# Patient Record
Sex: FEMALE | Race: WHITE | NOT HISPANIC OR LATINO | Employment: OTHER | URBAN - NONMETROPOLITAN AREA
[De-identification: names, ages, dates, MRNs, and addresses within clinical notes are randomized per-mention and may not be internally consistent; named-entity substitution may affect disease eponyms.]

---

## 2017-01-09 ENCOUNTER — HOSPITAL ENCOUNTER (OUTPATIENT)
Dept: CT IMAGING | Facility: HOSPITAL | Age: 82
Discharge: HOME | End: 2017-01-09
Attending: INTERNAL MEDICINE | Admitting: INTERNAL MEDICINE

## 2017-01-09 DIAGNOSIS — Q24.8 PERICARDIAL CYST: ICD-10-CM

## 2017-01-09 LAB — CREAT BLDA-MCNC: 0.9 MG/DL (ref 0.6–1.3)

## 2017-01-09 PROCEDURE — 82565 ASSAY OF CREATININE: CPT

## 2017-01-09 PROCEDURE — 71275 CT ANGIOGRAPHY CHEST: CPT

## 2017-01-09 PROCEDURE — 0 IOPAMIDOL PER 1 ML: Performed by: INTERNAL MEDICINE

## 2017-01-09 RX ADMIN — IOPAMIDOL 150 ML: 755 INJECTION, SOLUTION INTRAVENOUS at 10:30

## 2017-08-15 ENCOUNTER — APPOINTMENT (OUTPATIENT)
Dept: GENERAL RADIOLOGY | Facility: HOSPITAL | Age: 82
End: 2017-08-15

## 2017-08-15 ENCOUNTER — HOSPITAL ENCOUNTER (OUTPATIENT)
Facility: HOSPITAL | Age: 82
Setting detail: OBSERVATION
Discharge: HOME | End: 2017-08-15
Attending: EMERGENCY MEDICINE | Admitting: FAMILY MEDICINE

## 2017-08-15 ENCOUNTER — APPOINTMENT (OUTPATIENT)
Dept: CARDIOLOGY | Facility: HOSPITAL | Age: 82
End: 2017-08-15
Attending: FAMILY MEDICINE

## 2017-08-15 VITALS
HEART RATE: 82 BPM | BODY MASS INDEX: 23.9 KG/M2 | SYSTOLIC BLOOD PRESSURE: 132 MMHG | WEIGHT: 140 LBS | RESPIRATION RATE: 18 BRPM | TEMPERATURE: 97.3 F | OXYGEN SATURATION: 98 % | HEIGHT: 64 IN | DIASTOLIC BLOOD PRESSURE: 67 MMHG

## 2017-08-15 DIAGNOSIS — I48.91 ATRIAL FIBRILLATION WITH RVR (HCC): ICD-10-CM

## 2017-08-15 DIAGNOSIS — R07.9 CHEST PAIN, UNSPECIFIED TYPE: Primary | ICD-10-CM

## 2017-08-15 LAB
GLUCOSE BLDC GLUCOMTR-MCNC: 124 MG/DL (ref 70–130)
TROPONIN I SERPL-MCNC: 0.02 NG/ML (ref 0–0.03)
TROPONIN I SERPL-MCNC: 0.03 NG/ML (ref 0–0.03)
TROPONIN I SERPL-MCNC: <0.012 NG/ML (ref 0–0.03)

## 2017-08-15 PROCEDURE — 93010 ELECTROCARDIOGRAM REPORT: CPT | Performed by: INTERNAL MEDICINE

## 2017-08-15 PROCEDURE — 99203 OFFICE O/P NEW LOW 30 MIN: CPT | Performed by: INTERNAL MEDICINE

## 2017-08-15 PROCEDURE — 96366 THER/PROPH/DIAG IV INF ADDON: CPT

## 2017-08-15 PROCEDURE — G0378 HOSPITAL OBSERVATION PER HR: HCPCS

## 2017-08-15 PROCEDURE — 82962 GLUCOSE BLOOD TEST: CPT

## 2017-08-15 PROCEDURE — 36415 COLL VENOUS BLD VENIPUNCTURE: CPT

## 2017-08-15 PROCEDURE — 71010 HC CHEST PA OR AP: CPT

## 2017-08-15 PROCEDURE — C8929 TTE W OR WO FOL WCON,DOPPLER: HCPCS

## 2017-08-15 PROCEDURE — 93005 ELECTROCARDIOGRAM TRACING: CPT

## 2017-08-15 PROCEDURE — 96365 THER/PROPH/DIAG IV INF INIT: CPT

## 2017-08-15 PROCEDURE — 84484 ASSAY OF TROPONIN QUANT: CPT | Performed by: EMERGENCY MEDICINE

## 2017-08-15 PROCEDURE — 99285 EMERGENCY DEPT VISIT HI MDM: CPT

## 2017-08-15 PROCEDURE — 25010000002 PERFLUTREN 6.52 MG/ML SUSPENSION: Performed by: INTERNAL MEDICINE

## 2017-08-15 PROCEDURE — 93005 ELECTROCARDIOGRAM TRACING: CPT | Performed by: FAMILY MEDICINE

## 2017-08-15 PROCEDURE — 93306 TTE W/DOPPLER COMPLETE: CPT | Performed by: INTERNAL MEDICINE

## 2017-08-15 PROCEDURE — 84484 ASSAY OF TROPONIN QUANT: CPT | Performed by: FAMILY MEDICINE

## 2017-08-15 RX ORDER — DILTIAZEM HCL/D5W 125 MG/125
5-15 PLASTIC BAG, INJECTION (ML) INTRAVENOUS
Status: DISCONTINUED | OUTPATIENT
Start: 2017-08-15 | End: 2017-08-15 | Stop reason: HOSPADM

## 2017-08-15 RX ORDER — SODIUM CHLORIDE 0.9 % (FLUSH) 0.9 %
1-10 SYRINGE (ML) INJECTION AS NEEDED
Status: DISCONTINUED | OUTPATIENT
Start: 2017-08-15 | End: 2017-08-15 | Stop reason: HOSPADM

## 2017-08-15 RX ORDER — LISINOPRIL 20 MG/1
40 TABLET ORAL DAILY
Status: DISCONTINUED | OUTPATIENT
Start: 2017-08-15 | End: 2017-08-15 | Stop reason: HOSPADM

## 2017-08-15 RX ORDER — ACETAMINOPHEN 500 MG
1000 TABLET ORAL ONCE
Status: COMPLETED | OUTPATIENT
Start: 2017-08-15 | End: 2017-08-15

## 2017-08-15 RX ORDER — METHOCARBAMOL 500 MG/1
500 TABLET, FILM COATED ORAL 4 TIMES DAILY
Status: DISCONTINUED | OUTPATIENT
Start: 2017-08-15 | End: 2017-08-15 | Stop reason: HOSPADM

## 2017-08-15 RX ORDER — POLYETHYLENE GLYCOL 3350 17 G/17G
17 POWDER, FOR SOLUTION ORAL DAILY
Status: DISCONTINUED | OUTPATIENT
Start: 2017-08-15 | End: 2017-08-15 | Stop reason: HOSPADM

## 2017-08-15 RX ORDER — DOCUSATE SODIUM 100 MG/1
100 CAPSULE, LIQUID FILLED ORAL 2 TIMES DAILY
Status: DISCONTINUED | OUTPATIENT
Start: 2017-08-15 | End: 2017-08-15 | Stop reason: HOSPADM

## 2017-08-15 RX ORDER — HYDROCHLOROTHIAZIDE 25 MG/1
25 TABLET ORAL DAILY
Status: DISCONTINUED | OUTPATIENT
Start: 2017-08-15 | End: 2017-08-15 | Stop reason: HOSPADM

## 2017-08-15 RX ORDER — SODIUM CHLORIDE 9 MG/ML
100 INJECTION, SOLUTION INTRAVENOUS CONTINUOUS
Status: DISCONTINUED | OUTPATIENT
Start: 2017-08-15 | End: 2017-08-15 | Stop reason: HOSPADM

## 2017-08-15 RX ORDER — NEBIVOLOL 5 MG/1
10 TABLET ORAL DAILY
Status: DISCONTINUED | OUTPATIENT
Start: 2017-08-15 | End: 2017-08-15 | Stop reason: HOSPADM

## 2017-08-15 RX ORDER — PANTOPRAZOLE SODIUM 40 MG/1
40 TABLET, DELAYED RELEASE ORAL
Status: DISCONTINUED | OUTPATIENT
Start: 2017-08-15 | End: 2017-08-15 | Stop reason: HOSPADM

## 2017-08-15 RX ADMIN — SODIUM CHLORIDE 100 ML/HR: 9 INJECTION, SOLUTION INTRAVENOUS at 00:59

## 2017-08-15 RX ADMIN — RIVAROXABAN 10 MG: 10 TABLET, FILM COATED ORAL at 08:36

## 2017-08-15 RX ADMIN — PANTOPRAZOLE SODIUM 40 MG: 40 TABLET, DELAYED RELEASE ORAL at 08:36

## 2017-08-15 RX ADMIN — POLYETHYLENE GLYCOL 3350 17 G: 17 POWDER, FOR SOLUTION ORAL at 08:34

## 2017-08-15 RX ADMIN — HYDROCHLOROTHIAZIDE 25 MG: 25 TABLET ORAL at 08:34

## 2017-08-15 RX ADMIN — PANTOPRAZOLE SODIUM 40 MG: 40 TABLET, DELAYED RELEASE ORAL at 17:36

## 2017-08-15 RX ADMIN — ACETAMINOPHEN 1000 MG: 500 TABLET, FILM COATED ORAL at 01:06

## 2017-08-15 RX ADMIN — METHOCARBAMOL 500 MG: 500 TABLET ORAL at 12:26

## 2017-08-15 RX ADMIN — DOCUSATE SODIUM 100 MG: 100 CAPSULE ORAL at 08:35

## 2017-08-15 RX ADMIN — PERFLUTREN 8.48 MG: 6.52 INJECTION, SUSPENSION INTRAVENOUS at 15:41

## 2017-08-15 RX ADMIN — LISINOPRIL 40 MG: 20 TABLET ORAL at 08:35

## 2017-08-15 RX ADMIN — METHOCARBAMOL 500 MG: 500 TABLET ORAL at 17:36

## 2017-08-15 RX ADMIN — METHOCARBAMOL 500 MG: 500 TABLET ORAL at 08:35

## 2017-08-15 RX ADMIN — DOCUSATE SODIUM 100 MG: 100 CAPSULE ORAL at 17:36

## 2017-08-15 RX ADMIN — DILTIAZEM HCL 125 MG/125ML IN DEXTROSE 5% IV SOLN (1 MG/ML) 15 MG/HR: 125-5/125 SOLUTION at 00:21

## 2017-08-15 NOTE — PLAN OF CARE
Problem: Patient Care Overview (Adult)  Goal: Plan of Care Review  Outcome: Ongoing (interventions implemented as appropriate)    08/15/17 5327   Coping/Psychosocial Response Interventions   Plan Of Care Reviewed With patient;family   Outcome Evaluation   Outcome Summary/Follow up Plan Pt admitted to room 471 from ED with chest pain. Pt is a/o x4. VSS. Pt on Cardizem drip. Family remains at bedside. Call light within reach, will continue to monitor.        Goal: Adult Individualization and Mutuality  Outcome: Ongoing (interventions implemented as appropriate)  Goal: Discharge Needs Assessment  Outcome: Ongoing (interventions implemented as appropriate)    Problem: Acute Coronary Syndrome (ACS) (Adult)  Goal: Signs and Symptoms of Listed Potential Problems Will be Absent or Manageable (Acute Coronary Syndrome)  Outcome: Ongoing (interventions implemented as appropriate)

## 2017-08-15 NOTE — PROGRESS NOTES
Discharge Planning Assessment  [facility]     Patient Name: Sofía Clifton  MRN: [id number]  Today's Date: 8/15/2017    Admit Date: 8/15/2017          Discharge Needs Assessment       08/15/17 1107    Living Environment    Lives With alone    Living Arrangements house    Home Accessibility bed and bath on same level    Stair Railings at Home none    Type of Financial/Environmental Concern none    Transportation Available family or friend will provide    Living Environment    Provides Primary Care For no one    Quality Of Family Relationships supportive    Able to Return to Prior Living Arrangements yes    Discharge Needs Assessment    Concerns To Be Addressed denies needs/concerns at this time    Readmission Within The Last 30 Days no previous admission in last 30 days    Anticipated Changes Related to Illness none    Equipment Currently Used at Home bath bench;cane, quad;raised toilet;walker, rolling;shower chair;wheelchair;commode    Equipment Needed After Discharge none            Discharge Plan       08/15/17 1109    Case Management/Social Work Plan    Plan Home    Additional Comments Spoke with pt and family in room to assess for home needs. Pt lives alone and plans same. Family live close and assist as needed.  Pt has needed DME, does not use W/C but for long distances.  She denies need for HH care at present time. She sees Dr. Rayo Pelayo in [place] and has RX coverage.         Discharge Placement     No information found                Demographic Summary     None            Functional Status     None            Psychosocial     None            Abuse/Neglect     None            Legal     None            Substance Abuse     None            Patient Forms     None          PABLO Jang

## 2017-08-15 NOTE — PLAN OF CARE
Problem: Patient Care Overview (Adult)  Goal: Plan of Care Review  Outcome: Ongoing (interventions implemented as appropriate)    08/15/17 0447 08/15/17 1527   Coping/Psychosocial Response Interventions   Plan Of Care Reviewed With patient;family --    Outcome Evaluation   Outcome Summary/Follow up Plan --  Pt being discharged today, Goals met.          Problem: Acute Coronary Syndrome (ACS) (Adult)  Goal: Signs and Symptoms of Listed Potential Problems Will be Absent or Manageable (Acute Coronary Syndrome)  Outcome: Ongoing (interventions implemented as appropriate)

## 2017-08-15 NOTE — DISCHARGE SUMMARY
"    [facility] Medicine Services  DISCHARGE SUMMARY       Date of Admission: 8/15/2017  Date of Discharge:  8/15/2017  Primary Care Physician: Sukhi Pelayo MD    Presenting Problem/History of Present Illness:  Chest pain [R07.9]  Chest pain, unspecified type [R07.9]     Final Discharge Diagnoses:  Hospital Problem List     Paroxysmal atrial fibrillation    Anemia    Benign essential hypertension    Gastroesophageal reflux disease with esophagitis    Chronic constipation    Osteoarthritis    Hypercholesteremia    Chest pain          Consults: Cardiology    Procedures Performed: None    Pertinent Test Results: NA    Chief Complaint on Day of Discharge: Chest pain    History of Present Illness on Day of Discharge: Patient went back into NSR.  Cards recommended no medication changes and 6 months outpatient follow up at this time.  Patient is currently Chest pain free.  Troponins negative.      Hospital Course:  The patient is a 82 y.o. female who presented to [facility] with chest pain and found to be in atrial fibrillation with RVR.  She was placed on a diltiazem drip and went into NSR.  Cardiac enzymes and were found to be negative.  Cardiology was consulted and recommended continuing the same meds without changes since the patient went back into sinus rhythm.  They also recommended a 6 month follow up.      Condition on Discharge:  Good.    Physical Exam on Discharge:  /67 (BP Location: Right arm, Patient Position: Lying)  Pulse 82  Temp 97.3 °F (36.3 °C) (Temporal Artery )   Resp 18  Ht 64\" (162.6 cm)  Wt 140 lb (63.5 kg)  SpO2 98%  BMI 24.03 kg/m2  Physical Exam   Constitutional: She is oriented to person, place, and time. She appears well-developed and well-nourished.   HENT:   Head: Normocephalic and atraumatic.   Right Ear: External ear normal.   Left Ear: External ear normal.   Nose: Nose normal.   Mouth/Throat: Oropharynx is clear and moist.   Eyes: " Conjunctivae and EOM are normal.   Neck: Normal range of motion. Neck supple.   Cardiovascular: Normal rate, regular rhythm and normal heart sounds.    Pulmonary/Chest: Effort normal and breath sounds normal.   Abdominal: Soft. Bowel sounds are normal. She exhibits no distension. There is no tenderness.   Musculoskeletal: Normal range of motion.   Neurological: She is alert and oriented to person, place, and time.   Skin: Skin is warm and dry.   Psychiatric: She has a normal mood and affect. Her speech is normal and behavior is normal. Cognition and memory are normal.         Discharge Disposition:  Home or Self Care    Discharge Medications:   Sofía Clifton   Home Medication Instructions HEMA:[id number]    Printed on:08/15/17 6506   Medication Information                      aspirin 81 MG EC tablet  Take 81 mg by mouth Daily.             Calcium Carb-Cholecalciferol (CALCIUM 600/VITAMIN D3 PO)  Take 1 tablet by mouth Daily.             cetirizine (zyrTEC) 10 MG tablet  Take 10 mg by mouth Daily.             conjugated estrogens (PREMARIN) 0.625 MG/GM vaginal cream  Insert 0.5 g into the vagina Daily.             dabigatran etexilate (PRADAXA) 150 MG capsu  Take 150 mg by mouth 2 (Two) Times a Day.             dexlansoprazole (DEXILANT) 60 MG capsule  Take 60 mg by mouth Daily.             Diphenhydramine-APAP, sleep, (TYLENOL PM EXTRA STRENGTH PO)  Take  by mouth.             docusate sodium (COLACE) 50 MG capsule  Take  by mouth 2 (Two) Times a Day.             fexofenadine (ALLEGRA) 180 MG tablet  Take 180 mg by mouth Daily.             hydrochlorothiazide (HYDRODIURIL) 25 MG tablet  Take 25 mg by mouth Daily.             HYDROcodone-acetaminophen (NORCO) 7.5-325 MG per tablet  Take 1 tablet by mouth Every 6 (Six) Hours As Needed for moderate pain (4-6).             lisinopril (PRINIVIL,ZESTRIL) 40 MG tablet  Take 40 mg by mouth Daily.             methocarbamol (ROBAXIN) 500 MG tablet  Take 500 mg by mouth 4  (Four) Times a Day.             NABUMETONE PO  Take 750 mg by mouth 2 (Two) Times a Day.             nebivolol (BYSTOLIC) 10 MG tablet  Take 10 mg by mouth Daily.             polyethylene glycol (MIRALAX) packet  Take 17 g by mouth Daily.             rivaroxaban (XARELTO) 10 MG tablet  Take  by mouth Daily.                 Discharge Diet:   Diet Instructions     Diet: Cardiac; Thin       Discharge Diet:  Cardiac   Fluid Consistency:  Thin                 Activity at Discharge:   Activity Instructions     Activity as Tolerated                     Discharge Care Plan/Instructions:   Continue current meds    Follow-up Appointments:   No future appointments.    Test Results Pending at Discharge: None    Aaron Ames MD  08/15/17  3:02 PM    Time: <30 minutes

## 2017-08-15 NOTE — ED PROVIDER NOTES
"Subjective   HPI Comments: Patient seen by the outside facility for chest pain this evening.  She states that around 2100 this evening she felt like her heart began to race.  This then led her to have a sharp central left-sided chest pain.  She has had something similar in the past and not usually associated with her atrial fibrillation.  The outside facility was unable to admit patient because they do not have a cardiologist for her her to be further evaluated.  Patient was transferred here for admission.      History provided by:  Patient      Review of Systems   Constitutional: Negative for activity change, fatigue and fever.   HENT: Negative for congestion, ear pain, sinus pressure and trouble swallowing.    Eyes: Negative for photophobia and itching.   Respiratory: Negative for cough, chest tightness, shortness of breath and wheezing.    Cardiovascular: Positive for chest pain and palpitations.   Gastrointestinal: Negative for abdominal pain, diarrhea, nausea and vomiting.   Genitourinary: Negative for dysuria, flank pain, vaginal bleeding and vaginal discharge.   Musculoskeletal: Negative for back pain, joint swelling and neck pain.   Skin: Negative for color change and wound.   Neurological: Negative for speech difficulty, weakness, light-headedness and headaches.   Psychiatric/Behavioral: Negative for behavioral problems and suicidal ideas. The patient is not nervous/anxious.        Past Medical History:   Diagnosis Date   • Anemia 11/18/2016   • Benign essential hypertension 11/18/2016   • Chronic constipation 11/18/2016   • Degeneration of lumbar intervertebral disc 11/18/2016   • Diverticulitis of colon 11/18/2016   • Gastroesophageal reflux disease with esophagitis 11/18/2016   • Hypercholesteremia 11/18/2016   • Osteoarthritis 11/18/2016   • Paroxysmal atrial fibrillation 11/18/2016       Allergies   Allergen Reactions   • Codeine Hives   • Morphine And Related      \"Burning inside\" per patient   • " Norflex [Orphenadrine Citrate] Hives   • Stadol [Butorphanol]        Past Surgical History:   Procedure Laterality Date   • APPENDECTOMY     • BACK SURGERY      C-SPINE, LUMBAR SPINE   • BLADDER SURGERY     • CHOLECYSTECTOMY     • HYSTERECTOMY     • JOINT REPLACEMENT Right     HIP   • KNEE SURGERY Right    • TOTAL HIP ARTHROPLASTY Left        Family History   Problem Relation Age of Onset   • Heart disease Father    • Heart attack Father    • Heart disease Sister    • Heart attack Sister    • Heart disease Mother    • Alzheimer's disease Mother    • Heart disease Brother    • No Known Problems Maternal Grandmother    • No Known Problems Maternal Grandfather    • No Known Problems Paternal Grandmother    • No Known Problems Paternal Grandfather    • Heart disease Sister    • Heart disease Brother    • Heart disease Brother    • Heart disease Brother    • Heart disease Brother    • No Known Problems Brother    • Heart disease Brother    • Heart disease Brother        Social History     Social History   • Marital status:      Spouse name: N/A   • Number of children: N/A   • Years of education: N/A     Social History Main Topics   • Smoking status: Never Smoker   • Smokeless tobacco: Never Used   • Alcohol use No   • Drug use: No   • Sexual activity: Defer     Other Topics Concern   • None     Social History Narrative           Objective   Physical Exam   Constitutional: She is oriented to person, place, and time. She appears well-developed and well-nourished. No distress.   HENT:   Head: Normocephalic and atraumatic.   Mouth/Throat: Oropharynx is clear and moist. No oropharyngeal exudate.   Eyes: EOM are normal. Pupils are equal, round, and reactive to light.   Neck: Normal range of motion. Neck supple. No JVD present.   Cardiovascular: Normal rate, S1 normal, S2 normal and normal heart sounds.  A regularly irregular rhythm present. Exam reveals no gallop and no friction rub.    No murmur  heard.  Pulmonary/Chest: Effort normal and breath sounds normal. No respiratory distress. She has no wheezes. She has no rales.   Abdominal: Soft. Bowel sounds are normal. She exhibits no distension. There is no tenderness. There is no rebound and no guarding.   Neurological: She is alert and oriented to person, place, and time. No cranial nerve deficit.   Skin: Skin is warm and dry. No rash noted.   Psychiatric: She has a normal mood and affect. Her behavior is normal. Judgment and thought content normal.   Nursing note and vitals reviewed.      Procedures         ED Course  ED Course   Value Comment By Time   ECG 12 Lead A. fib with a rate of 113, normal axis, no acute ST elevations or depressions. Tank Gabriel MD 08/15 0140      Lab Results (last 24 hours)     Procedure Component Value Units Date/Time    Troponin [[id number]]  (Normal) Collected:  08/15/17 0032    Specimen:  Blood Updated:  08/15/17 0118     Troponin I <0.012 ng/mL                 MDM  Number of Diagnoses or Management Options  Atrial fibrillation with RVR: new and requires workup  Chest pain, unspecified type: new and requires workup  Diagnosis management comments: Patient is currently chest pain-free.  She is in A. fib with a rate of 88.  I spoke to hospitalist and they have agreed to admit patient for further evaluation and consultation to cardiology.       Amount and/or Complexity of Data Reviewed  Clinical lab tests: ordered and reviewed  Tests in the radiology section of CPT®: ordered and reviewed  Tests in the medicine section of CPT®: ordered and reviewed  Independent visualization of images, tracings, or specimens: yes    Risk of Complications, Morbidity, and/or Mortality  Presenting problems: moderate  Diagnostic procedures: moderate  Management options: moderate    Patient Progress  Patient progress: stable      Final diagnoses:   Chest pain, unspecified type   Atrial fibrillation with RVR            Tank Gabriel MD  08/15/17  0149

## 2017-08-15 NOTE — H&P
[facility] Medicine Services  HISTORY AND PHYSICAL    Date of Admission: 8/15/2017  Primary Care Physician: Sukhi Pelayo MD    Subjective     Chief Complaint: Palpitation    History of Present Illness  82-year-old female with past medical history of hypertension, lumbar degeneration, diverticulitis, GERD, esophagitis, hyperlipidemia, atrial fibrillation comes to the ER with complaints of palpitation and chest discomfort since this evening.  Patient states she was getting ready for bed when she started feeling like her heart was racing.  She also felt midsternal pressure-like chest discomfort.  She had similar symptoms before which is usually associated with her atrial fibrillation.  Patient is currently on Xarelto for anticoagulation and by systolic for rate control.  She does not have a cardiologist.  Her cardiac medications are monitored by her primary care physician.  In the ER, patient's EKG was significant for A. fib with rate of 113.  Patient was started on Cardizem drip and was administered Nitropaste.  During examination on the floor patient was found to be in normal sinus rhythm at a rate of 70s.  She denied any discomfort during examination.  She is currently off Cardizem drip.        Review of Systems     Otherwise complete ROS reviewed and negative except as mentioned in the HPI.      Past Medical History:   Past Medical History:   Diagnosis Date   • Anemia 11/18/2016   • Benign essential hypertension 11/18/2016   • Chronic constipation 11/18/2016   • Degeneration of lumbar intervertebral disc 11/18/2016   • Diverticulitis of colon 11/18/2016   • Gastroesophageal reflux disease with esophagitis 11/18/2016   • Hypercholesteremia 11/18/2016   • Osteoarthritis 11/18/2016   • Paroxysmal atrial fibrillation 11/18/2016       Past Surgical History:  Past Surgical History:   Procedure Laterality Date   • APPENDECTOMY     • BACK SURGERY      C-SPINE, LUMBAR SPINE   • BLADDER  "SURGERY     • CHOLECYSTECTOMY     • HYSTERECTOMY     • JOINT REPLACEMENT Right     HIP   • KNEE SURGERY Right    • TOTAL HIP ARTHROPLASTY Left        Social History:  reports that she has never smoked. She has never used smokeless tobacco. She reports that she does not drink alcohol or use illicit drugs.    Family History: family history includes Alzheimer's disease in her mother; Heart attack in her father and sister; Heart disease in her brother, brother, brother, brother, brother, brother, brother, father, mother, sister, and sister; No Known Problems in her brother, maternal grandfather, maternal grandmother, paternal grandfather, and paternal grandmother.       Allergies:  Allergies   Allergen Reactions   • Codeine Hives   • Morphine And Related      \"Burning inside\" per patient   • Norflex [Orphenadrine Citrate] Hives   • Stadol [Butorphanol] Mental Status Change and Irritability       Medications:  Prior to Admission medications    Medication Sig Start Date End Date Taking? Authorizing Provider   aspirin 81 MG EC tablet Take 81 mg by mouth Daily.   Yes Historical Provider, MD   Calcium Carb-Cholecalciferol (CALCIUM 600/VITAMIN D3 PO) Take 1 tablet by mouth Daily.   Yes Historical Provider, MD   cetirizine (zyrTEC) 10 MG tablet Take 10 mg by mouth Daily.   Yes Historical Provider, MD   conjugated estrogens (PREMARIN) 0.625 MG/GM vaginal cream Insert 0.5 g into the vagina Daily.   Yes Historical Provider, MD   dabigatran etexilate (PRADAXA) 150 MG capsu Take 150 mg by mouth 2 (Two) Times a Day.   Yes Historical Provider, MD   dexlansoprazole (DEXILANT) 60 MG capsule Take 60 mg by mouth Daily.   Yes Historical Provider, MD   Diphenhydramine-APAP, sleep, (TYLENOL PM EXTRA STRENGTH PO) Take  by mouth.   Yes Historical Provider, MD   docusate sodium (COLACE) 50 MG capsule Take  by mouth 2 (Two) Times a Day.   Yes Historical Provider, MD   HYDROcodone-acetaminophen (NORCO) 7.5-325 MG per tablet Take 1 tablet by " "mouth Every 6 (Six) Hours As Needed for moderate pain (4-6).   Yes Historical Provider, MD   lisinopril (PRINIVIL,ZESTRIL) 40 MG tablet Take 40 mg by mouth Daily.   Yes Historical Provider, MD   methocarbamol (ROBAXIN) 500 MG tablet Take 500 mg by mouth 4 (Four) Times a Day.   Yes Historical Provider, MD   NABUMETONE PO Take 750 mg by mouth 2 (Two) Times a Day.   Yes Historical Provider, MD   nebivolol (BYSTOLIC) 10 MG tablet Take 10 mg by mouth Daily.   Yes Historical Provider, MD   polyethylene glycol (MIRALAX) packet Take 17 g by mouth Daily.   Yes Historical Provider, MD   rivaroxaban (XARELTO) 10 MG tablet Take  by mouth Daily.   Yes Historical Provider, MD   fexofenadine (ALLEGRA) 180 MG tablet Take 180 mg by mouth Daily.    Historical Provider, MD   hydrochlorothiazide (HYDRODIURIL) 25 MG tablet Take 25 mg by mouth Daily.    Historical Provider, MD       Objective     Vital Signs: /59 (BP Location: Right arm, Patient Position: Lying)  Pulse 65  Temp 97.8 °F (36.6 °C) (Oral)   Resp 16  Ht 64\" (162.6 cm)  Wt 140 lb (63.5 kg)  SpO2 96%  BMI 24.03 kg/m2  Physical Exam   Constitutional: She is oriented to person, place, and time. She appears well-developed.   HENT:   Head: Normocephalic and atraumatic.   Eyes: EOM are normal. Pupils are equal, round, and reactive to light.   Neck: Normal range of motion. Neck supple.   Cardiovascular: Normal rate, regular rhythm and normal heart sounds.    Pulmonary/Chest: Effort normal and breath sounds normal.   Abdominal: Soft. Bowel sounds are normal.   Musculoskeletal: Normal range of motion.   Neurological: She is alert and oriented to person, place, and time. She has normal reflexes.   Skin: Skin is warm and dry.   Psychiatric: She has a normal mood and affect. Her behavior is normal. Judgment and thought content normal.           Results Reviewed:  Lab Results (last 24 hours)     Procedure Component Value Units Date/Time    Troponin [[id number]]  (Normal) " Collected:  08/15/17 0032    Specimen:  Blood Updated:  08/15/17 0118     Troponin I <0.012 ng/mL         Imaging Results (last 24 hours)     ** No results found for the last 24 hours. **          I have personally reviewed and interpreted the radiology studies and ECG obtained at time of admission.     Assessment / Plan     Assessment & Plan  Hospital Problem List     Chest pain        Assessment:    1.  Paroxysmal atrial fibrillation  2.  Chest discomfort  3.  Hypertension  4.  Hyperlipidemia    Plan:    -Admit to telemetry  -Continue with cardiac monitoring  -Continuous pulse ox  -Follow-up cardiology consult  -Currently off Cardizem drip-rate controlled on cardiac monitor at 70s  -Continue Xarelto  -Hold aspirin for now-patient is not sure about aspirin administration at home-reconfirm with primary care physician  -Consider dose adjustment on beta blocker for rate control  -Follow-up echocardiogram  -Follow-up TSH  -Follow-up AM EKG  -Monitor electrolytes  -Monitor vitals  -Troponin noted to be negative  -Follow-up repeat troponins      Code Status:Full code     I discussed the patients findings and my recommendations with patient    Estimated length of stay 2 days    Stephanie Martinez MD   08/15/17   6:10 AM

## 2017-08-15 NOTE — CONSULTS
Patient Care Team:  Sukhi Pelayo MD as PCP - General  Sukhi Pelayo MD as PCP - Family Medicine  Sukhi Pelayo MD as Referring Physician (Internal Medicine)  Xavier Hough MD as Cardiologist (Cardiology)  Stephanie Martinez MD  REASON FOR REFERRAL: atrial fibrillation, RVR and chest pain   Chief complaint: palpitations, chest pain     Subjective     Patient is a 82 y.o. female presents with afib, rvr. She states she was sitting at home last night around 830 pm when she developed palpitations, chest discomfort, dyspnea, nausea, jaw and arm pain. She presented to an [facility]. I do not have these records available to me. Reportedly she was found to be in afib, RVR, with heart rates in the 200s. She was treated with IV cardizem and transferred to [facility]. She converted to a NSR and her symptoms have resolved. Initial EKG did confirm afib, no acute STT changes. Troponins are negative. She has no CAD history. She had a negative stress test last year. She follows with Dr. Hough for her PAF and is anticoagulated with Xarelto and takes bystolic as well. She also has a known pericardial fat pad. She reports her last episode of afib was in June when she was at [place] for hiatal hernia surgery.     Review of Systems   Review of Systems   Constitutional: Negative for diaphoresis, fatigue, fever and unexpected weight change.   HENT: Negative for nosebleeds.    Respiratory: Positive for shortness of breath. Negative for apnea, cough, chest tightness and wheezing.    Cardiovascular: Positive for chest pain and palpitations. Negative for leg swelling.   Gastrointestinal: Positive for nausea and vomiting. Negative for abdominal distention.   Genitourinary: Negative for hematuria.   Musculoskeletal: Positive for myalgias (jaw and arm pain ). Negative for gait problem.   Skin: Negative for color change.   Neurological: Negative for dizziness, syncope, weakness and light-headedness.       History  Past Medical  History:   Diagnosis Date   • Anemia 11/18/2016   • Benign essential hypertension 11/18/2016   • Chronic constipation 11/18/2016   • Degeneration of lumbar intervertebral disc 11/18/2016   • Diverticulitis of colon 11/18/2016   • Gastroesophageal reflux disease with esophagitis 11/18/2016   • Hypercholesteremia 11/18/2016   • Osteoarthritis 11/18/2016   • Paroxysmal atrial fibrillation 11/18/2016     Past Surgical History:   Procedure Laterality Date   • APPENDECTOMY     • BACK SURGERY      C-SPINE, LUMBAR SPINE   • BLADDER SURGERY     • CHOLECYSTECTOMY     • HYSTERECTOMY     • JOINT REPLACEMENT Right     HIP   • KNEE SURGERY Right    • TOTAL HIP ARTHROPLASTY Left      Family History   Problem Relation Age of Onset   • Heart disease Father    • Heart attack Father    • Heart disease Sister    • Heart attack Sister    • Heart disease Mother    • Alzheimer's disease Mother    • Heart disease Brother    • No Known Problems Maternal Grandmother    • No Known Problems Maternal Grandfather    • No Known Problems Paternal Grandmother    • No Known Problems Paternal Grandfather    • Heart disease Sister    • Heart disease Brother    • Heart disease Brother    • Heart disease Brother    • Heart disease Brother    • No Known Problems Brother    • Heart disease Brother    • Heart disease Brother      Social History   Substance Use Topics   • Smoking status: Never Smoker   • Smokeless tobacco: Never Used   • Alcohol use No     Prescriptions Prior to Admission   Medication Sig Dispense Refill Last Dose   • aspirin 81 MG EC tablet Take 81 mg by mouth Daily.   Taking   • Calcium Carb-Cholecalciferol (CALCIUM 600/VITAMIN D3 PO) Take 1 tablet by mouth Daily.   Taking   • cetirizine (zyrTEC) 10 MG tablet Take 10 mg by mouth Daily.   Taking   • conjugated estrogens (PREMARIN) 0.625 MG/GM vaginal cream Insert 0.5 g into the vagina Daily.   Taking   • dabigatran etexilate (PRADAXA) 150 MG capsu Take 150 mg by mouth 2 (Two) Times a Day.    Taking   • dexlansoprazole (DEXILANT) 60 MG capsule Take 60 mg by mouth Daily.   Taking   • Diphenhydramine-APAP, sleep, (TYLENOL PM EXTRA STRENGTH PO) Take  by mouth.   Taking   • docusate sodium (COLACE) 50 MG capsule Take  by mouth 2 (Two) Times a Day.   Taking   • HYDROcodone-acetaminophen (NORCO) 7.5-325 MG per tablet Take 1 tablet by mouth Every 6 (Six) Hours As Needed for moderate pain (4-6).   Taking   • lisinopril (PRINIVIL,ZESTRIL) 40 MG tablet Take 40 mg by mouth Daily.   Taking   • methocarbamol (ROBAXIN) 500 MG tablet Take 500 mg by mouth 4 (Four) Times a Day.   Taking   • NABUMETONE PO Take 750 mg by mouth 2 (Two) Times a Day.   Taking   • nebivolol (BYSTOLIC) 10 MG tablet Take 10 mg by mouth Daily.   Taking   • polyethylene glycol (MIRALAX) packet Take 17 g by mouth Daily.   Taking   • rivaroxaban (XARELTO) 10 MG tablet Take  by mouth Daily.      • fexofenadine (ALLEGRA) 180 MG tablet Take 180 mg by mouth Daily.   Not Taking   • hydrochlorothiazide (HYDRODIURIL) 25 MG tablet Take 25 mg by mouth Daily.   Not Taking       Current Facility-Administered Medications:   •  conjugated estrogens (PREMARIN) vaginal cream 0.5 application, 0.5 g, Vaginal, Daily, Stephanie Martinez MD  •  diltiaZEM (CARDIZEM) 125mg/125 mL (1 mg/mL) infusion, 5-15 mg/hr, Intravenous, Titrated, Tank Gabriel MD, Stopped at 08/15/17 0353  •  docusate sodium (COLACE) capsule 100 mg, 100 mg, Oral, BID, Stephanie Martinez MD, 100 mg at 08/15/17 0835  •  hydrochlorothiazide (HYDRODIURIL) tablet 25 mg, 25 mg, Oral, Daily, Stephanie Martinez MD, 25 mg at 08/15/17 0834  •  lisinopril (PRINIVIL,ZESTRIL) tablet 40 mg, 40 mg, Oral, Daily, Stephanie Martinez MD, 40 mg at 08/15/17 0835  •  methocarbamol (ROBAXIN) tablet 500 mg, 500 mg, Oral, 4x Daily, Stephanie Martinez MD, 500 mg at 08/15/17 0835  •  nebivolol (BYSTOLIC) tablet 10 mg, 10 mg, Oral, Daily, Stephanie Martinez MD  •  pantoprazole (PROTONIX) EC tablet 40 mg, 40 mg, Oral, BID AC,  Stephanie Martinez MD, 40 mg at 08/15/17 0836  •  polyethylene glycol (MIRALAX) powder 17 g, 17 g, Oral, Daily, Stephanie Martinez MD, 17 g at 08/15/17 0834  •  rivaroxaban (XARELTO) tablet 10 mg, 10 mg, Oral, Daily, Stephanie Martinez MD, 10 mg at 08/15/17 0836  •  sodium chloride 0.9 % flush 1-10 mL, 1-10 mL, Intravenous, PRN, Stephanie Martinez MD  •  sodium chloride 0.9 % infusion, 100 mL/hr, Intravenous, Continuous, Tank Gabriel MD, Last Rate: 100 mL/hr at 08/15/17 0059, 100 mL/hr at 08/15/17 0059  Allergies:  Codeine; Morphine and related; Norflex [orphenadrine citrate]; and Stadol [butorphanol]    Objective     Vital Signs  Temp:  [97.3 °F (36.3 °C)-98.2 °F (36.8 °C)] 97.3 °F (36.3 °C)  Heart Rate:  [65-87] 82  Resp:  [14-18] 18  BP: ()/(45-67) 132/67    Physical Exam:   Physical Exam   Constitutional: She is oriented to person, place, and time. She appears well-developed and well-nourished. No distress.   HENT:   Head: Normocephalic and atraumatic.   Eyes: Pupils are equal, round, and reactive to light.   Neck: Normal range of motion. Neck supple. No JVD present. No thyromegaly present.   Cardiovascular: Normal rate, regular rhythm, normal heart sounds and intact distal pulses.  Exam reveals no gallop and no friction rub.    No murmur heard.  Pulses:       Dorsalis pedis pulses are 2+ on the right side, and 2+ on the left side.   Pulmonary/Chest: Effort normal and breath sounds normal. No respiratory distress. She has no wheezes. She has no rales. She exhibits no tenderness.   Abdominal: Soft. Bowel sounds are normal. She exhibits no distension. There is no tenderness.   Musculoskeletal: Normal range of motion. She exhibits no edema.   Neurological: She is alert and oriented to person, place, and time. No cranial nerve deficit.   Skin: Skin is warm and dry. She is not diaphoretic.   Psychiatric: She has a normal mood and affect. Her behavior is normal.     Results Review:     Lab Results (last 72  hours)     Procedure Component Value Units Date/Time    Troponin [[id number]]  (Normal) Collected:  08/15/17 0032    Specimen:  Blood Updated:  08/15/17 0118     Troponin I <0.012 ng/mL     Troponin [[id number]]  (Normal) Collected:  08/15/17 0832    Specimen:  Blood Updated:  08/15/17 0922     Troponin I 0.021 ng/mL           Assessment/Plan     Paroxysmal atrial fibrillation, rapid ventricular response  Chest pain, secondary to afib, rvr  History of pericardial fat pad  Essential hypertension     Plan-  The patient is now maintaining a NSR,  ACS has been ruled out and symptoms are resolved    Continue bystolic at current home dose   Continue anticoagulation with Xarelto- increase to appropriate dose for renal function  No aspirin   No further cardiac workup or medication changes at this time.   Follow up Dr. Hough 6 weeks  If she has more frequent paroxysms of afib in the future, will adjust medications accordingly   Discussed with Dr. Hough    I discussed the patients findings and my recommendations with patient, family and nursing staff.     Raya Perez, FNANY  08/15/17  10:50 AM

## 2017-08-16 LAB
BH CV ECHO MEAS - AO MAX PG (FULL): 4.9 MMHG
BH CV ECHO MEAS - AO MAX PG: 9.5 MMHG
BH CV ECHO MEAS - AO MEAN PG (FULL): 4 MMHG
BH CV ECHO MEAS - AO MEAN PG: 7 MMHG
BH CV ECHO MEAS - AO ROOT AREA (BSA CORRECTED): 1.8
BH CV ECHO MEAS - AO ROOT AREA: 7.1 CM^2
BH CV ECHO MEAS - AO ROOT DIAM: 3 CM
BH CV ECHO MEAS - AO V2 MAX: 154 CM/SEC
BH CV ECHO MEAS - AO V2 MEAN: 122 CM/SEC
BH CV ECHO MEAS - AO V2 VTI: 39 CM
BH CV ECHO MEAS - AVA(I,A): 2.6 CM^2
BH CV ECHO MEAS - AVA(I,D): 2.6 CM^2
BH CV ECHO MEAS - AVA(V,A): 2.6 CM^2
BH CV ECHO MEAS - AVA(V,D): 2.6 CM^2
BH CV ECHO MEAS - BSA(HAYCOCK): 1.7 M^2
BH CV ECHO MEAS - BSA: 1.7 M^2
BH CV ECHO MEAS - BZI_BMI: 24 KILOGRAMS/M^2
BH CV ECHO MEAS - BZI_METRIC_HEIGHT: 162.6 CM
BH CV ECHO MEAS - BZI_METRIC_WEIGHT: 63.5 KG
BH CV ECHO MEAS - CONTRAST EF 4CH: 65.6 ML/M^2
BH CV ECHO MEAS - EDV(CUBED): 68.9 ML
BH CV ECHO MEAS - EDV(MOD-SP4): 103 ML
BH CV ECHO MEAS - EDV(TEICH): 74.2 ML
BH CV ECHO MEAS - EF(CUBED): 82.3 %
BH CV ECHO MEAS - EF(MOD-SP4): 65.6 %
BH CV ECHO MEAS - EF(TEICH): 75.6 %
BH CV ECHO MEAS - ESV(CUBED): 12.2 ML
BH CV ECHO MEAS - ESV(MOD-SP4): 35.4 ML
BH CV ECHO MEAS - ESV(TEICH): 18.1 ML
BH CV ECHO MEAS - FS: 43.9 %
BH CV ECHO MEAS - IVS/LVPW: 0.89
BH CV ECHO MEAS - IVSD: 0.8 CM
BH CV ECHO MEAS - LA DIMENSION: 3.2 CM
BH CV ECHO MEAS - LA/AO: 1.1
BH CV ECHO MEAS - LV DIASTOLIC VOL/BSA (35-75): 61.3 ML/M^2
BH CV ECHO MEAS - LV MASS(C)D: 105.6 GRAMS
BH CV ECHO MEAS - LV MASS(C)DI: 62.8 GRAMS/M^2
BH CV ECHO MEAS - LV MAX PG: 4.6 MMHG
BH CV ECHO MEAS - LV MEAN PG: 3 MMHG
BH CV ECHO MEAS - LV SYSTOLIC VOL/BSA (12-30): 21.1 ML/M^2
BH CV ECHO MEAS - LV V1 MAX: 107 CM/SEC
BH CV ECHO MEAS - LV V1 MEAN: 71.3 CM/SEC
BH CV ECHO MEAS - LV V1 VTI: 26.5 CM
BH CV ECHO MEAS - LVIDD: 4.1 CM
BH CV ECHO MEAS - LVIDS: 2.3 CM
BH CV ECHO MEAS - LVLD AP4: 7.4 CM
BH CV ECHO MEAS - LVLS AP4: 6 CM
BH CV ECHO MEAS - LVOT AREA (M): 3.8 CM^2
BH CV ECHO MEAS - LVOT AREA: 3.8 CM^2
BH CV ECHO MEAS - LVOT DIAM: 2.2 CM
BH CV ECHO MEAS - LVPWD: 0.9 CM
BH CV ECHO MEAS - MV A MAX VEL: 99.4 CM/SEC
BH CV ECHO MEAS - MV DEC TIME: 0.25 SEC
BH CV ECHO MEAS - MV E MAX VEL: 79.1 CM/SEC
BH CV ECHO MEAS - MV E/A: 0.8
BH CV ECHO MEAS - SI(AO): 164 ML/M^2
BH CV ECHO MEAS - SI(CUBED): 33.8 ML/M^2
BH CV ECHO MEAS - SI(LVOT): 59.9 ML/M^2
BH CV ECHO MEAS - SI(MOD-SP4): 40.2 ML/M^2
BH CV ECHO MEAS - SI(TEICH): 33.4 ML/M^2
BH CV ECHO MEAS - SV(AO): 275.7 ML
BH CV ECHO MEAS - SV(CUBED): 56.8 ML
BH CV ECHO MEAS - SV(LVOT): 100.7 ML
BH CV ECHO MEAS - SV(MOD-SP4): 67.6 ML
BH CV ECHO MEAS - SV(TEICH): 56.1 ML

## 2019-09-08 ENCOUNTER — HOSPITAL ENCOUNTER (OUTPATIENT)
Age: 84
Setting detail: OBSERVATION
Discharge: HOME | End: 2019-09-11
Attending: INTERNAL MEDICINE | Admitting: INTERNAL MEDICINE
Payer: MEDICARE

## 2019-09-08 PROBLEM — R07.9 CHEST PAIN: Status: ACTIVE | Noted: 2019-09-08

## 2019-09-08 LAB
ALBUMIN SERPL-MCNC: 4.2 G/DL (ref 3.5–5.2)
ALP BLD-CCNC: 60 U/L (ref 35–104)
ALT SERPL-CCNC: 10 U/L (ref 5–33)
ANION GAP SERPL CALCULATED.3IONS-SCNC: 13 MMOL/L (ref 7–19)
APTT: 32.3 SEC (ref 26–36.2)
APTT: 64.4 SEC (ref 26–36.2)
AST SERPL-CCNC: 17 U/L (ref 5–32)
BASOPHILS ABSOLUTE: 0.1 K/UL (ref 0–0.2)
BASOPHILS RELATIVE PERCENT: 1 % (ref 0–1)
BILIRUB SERPL-MCNC: 0.3 MG/DL (ref 0.2–1.2)
BUN BLDV-MCNC: 18 MG/DL (ref 8–23)
CALCIUM SERPL-MCNC: 9.9 MG/DL (ref 8.8–10.2)
CHLORIDE BLD-SCNC: 99 MMOL/L (ref 98–111)
CO2: 24 MMOL/L (ref 22–29)
CREAT SERPL-MCNC: 0.7 MG/DL (ref 0.5–0.9)
D DIMER: 0.57 UG/ML FEU (ref 0–0.48)
EOSINOPHILS ABSOLUTE: 0.5 K/UL (ref 0–0.6)
EOSINOPHILS RELATIVE PERCENT: 5.4 % (ref 0–5)
GFR NON-AFRICAN AMERICAN: >60
GLUCOSE BLD-MCNC: 109 MG/DL (ref 74–109)
HCT VFR BLD CALC: 32.7 % (ref 37–47)
HEMOGLOBIN: 11 G/DL (ref 12–16)
IMMATURE GRANULOCYTES #: 0 K/UL
LYMPHOCYTES ABSOLUTE: 1.8 K/UL (ref 1.1–4.5)
LYMPHOCYTES RELATIVE PERCENT: 20.3 % (ref 20–40)
MCH RBC QN AUTO: 33.1 PG (ref 27–31)
MCHC RBC AUTO-ENTMCNC: 33.6 G/DL (ref 33–37)
MCV RBC AUTO: 98.5 FL (ref 81–99)
MONOCYTES ABSOLUTE: 0.8 K/UL (ref 0–0.9)
MONOCYTES RELATIVE PERCENT: 8.8 % (ref 0–10)
NEUTROPHILS ABSOLUTE: 5.7 K/UL (ref 1.5–7.5)
NEUTROPHILS RELATIVE PERCENT: 64.2 % (ref 50–65)
PDW BLD-RTO: 12.1 % (ref 11.5–14.5)
PLATELET # BLD: 260 K/UL (ref 130–400)
PMV BLD AUTO: 8.8 FL (ref 9.4–12.3)
POTASSIUM REFLEX MAGNESIUM: 3.7 MMOL/L (ref 3.5–5)
PRO-BNP: 984 PG/ML (ref 0–1800)
RBC # BLD: 3.32 M/UL (ref 4.2–5.4)
SEDIMENTATION RATE, ERYTHROCYTE: 20 MM/HR (ref 0–25)
SODIUM BLD-SCNC: 136 MMOL/L (ref 136–145)
TOTAL PROTEIN: 7.3 G/DL (ref 6.6–8.7)
TROPONIN: 0.03 NG/ML (ref 0–0.03)
TROPONIN: <0.01 NG/ML (ref 0–0.03)
WBC # BLD: 8.9 K/UL (ref 4.8–10.8)

## 2019-09-08 PROCEDURE — 2580000003 HC RX 258: Performed by: INTERNAL MEDICINE

## 2019-09-08 PROCEDURE — 83880 ASSAY OF NATRIURETIC PEPTIDE: CPT

## 2019-09-08 PROCEDURE — 2500000003 HC RX 250 WO HCPCS: Performed by: INTERNAL MEDICINE

## 2019-09-08 PROCEDURE — 6370000000 HC RX 637 (ALT 250 FOR IP): Performed by: INTERNAL MEDICINE

## 2019-09-08 PROCEDURE — 80053 COMPREHEN METABOLIC PANEL: CPT

## 2019-09-08 PROCEDURE — 96365 THER/PROPH/DIAG IV INF INIT: CPT

## 2019-09-08 PROCEDURE — 85652 RBC SED RATE AUTOMATED: CPT

## 2019-09-08 PROCEDURE — 85025 COMPLETE CBC W/AUTO DIFF WBC: CPT

## 2019-09-08 PROCEDURE — 36415 COLL VENOUS BLD VENIPUNCTURE: CPT

## 2019-09-08 PROCEDURE — 96375 TX/PRO/DX INJ NEW DRUG ADDON: CPT

## 2019-09-08 PROCEDURE — 96366 THER/PROPH/DIAG IV INF ADDON: CPT

## 2019-09-08 PROCEDURE — 85379 FIBRIN DEGRADATION QUANT: CPT

## 2019-09-08 PROCEDURE — 6360000002 HC RX W HCPCS: Performed by: INTERNAL MEDICINE

## 2019-09-08 PROCEDURE — G0378 HOSPITAL OBSERVATION PER HR: HCPCS

## 2019-09-08 PROCEDURE — 85730 THROMBOPLASTIN TIME PARTIAL: CPT

## 2019-09-08 PROCEDURE — G0379 DIRECT REFER HOSPITAL OBSERV: HCPCS

## 2019-09-08 PROCEDURE — 96376 TX/PRO/DX INJ SAME DRUG ADON: CPT

## 2019-09-08 PROCEDURE — 84484 ASSAY OF TROPONIN QUANT: CPT

## 2019-09-08 RX ORDER — SODIUM CHLORIDE 0.9 % (FLUSH) 0.9 %
10 SYRINGE (ML) INJECTION EVERY 12 HOURS SCHEDULED
Status: DISCONTINUED | OUTPATIENT
Start: 2019-09-08 | End: 2019-09-10 | Stop reason: SDUPTHER

## 2019-09-08 RX ORDER — HYDROCHLOROTHIAZIDE 25 MG/1
25 TABLET ORAL DAILY
COMMUNITY

## 2019-09-08 RX ORDER — HEPARIN SODIUM 1000 [USP'U]/ML
30 INJECTION, SOLUTION INTRAVENOUS; SUBCUTANEOUS PRN
Status: DISCONTINUED | OUTPATIENT
Start: 2019-09-08 | End: 2019-09-10

## 2019-09-08 RX ORDER — METHYLPREDNISOLONE SODIUM SUCCINATE 40 MG/ML
40 INJECTION, POWDER, LYOPHILIZED, FOR SOLUTION INTRAMUSCULAR; INTRAVENOUS ONCE
Status: COMPLETED | OUTPATIENT
Start: 2019-09-08 | End: 2019-09-08

## 2019-09-08 RX ORDER — LISINOPRIL 40 MG/1
40 TABLET ORAL DAILY
COMMUNITY

## 2019-09-08 RX ORDER — ASPIRIN 81 MG/1
81 TABLET, CHEWABLE ORAL DAILY
Status: DISCONTINUED | OUTPATIENT
Start: 2019-09-09 | End: 2019-09-11 | Stop reason: HOSPADM

## 2019-09-08 RX ORDER — HEPARIN SODIUM 10000 [USP'U]/100ML
12 INJECTION, SOLUTION INTRAVENOUS CONTINUOUS
Status: DISCONTINUED | OUTPATIENT
Start: 2019-09-08 | End: 2019-09-10

## 2019-09-08 RX ORDER — ONDANSETRON 2 MG/ML
4 INJECTION INTRAMUSCULAR; INTRAVENOUS EVERY 6 HOURS PRN
Status: DISCONTINUED | OUTPATIENT
Start: 2019-09-08 | End: 2019-09-10 | Stop reason: SDUPTHER

## 2019-09-08 RX ORDER — HEPARIN SODIUM 1000 [USP'U]/ML
60 INJECTION, SOLUTION INTRAVENOUS; SUBCUTANEOUS PRN
Status: DISCONTINUED | OUTPATIENT
Start: 2019-09-08 | End: 2019-09-10

## 2019-09-08 RX ORDER — ROPINIROLE 0.25 MG/1
0.25 TABLET, FILM COATED ORAL NIGHTLY
Status: DISCONTINUED | OUTPATIENT
Start: 2019-09-08 | End: 2019-09-11 | Stop reason: HOSPADM

## 2019-09-08 RX ORDER — NABUMETONE 750 MG/1
750 TABLET, FILM COATED ORAL 2 TIMES DAILY
COMMUNITY

## 2019-09-08 RX ORDER — HYDROCHLOROTHIAZIDE 25 MG/1
25 TABLET ORAL DAILY
Status: DISCONTINUED | OUTPATIENT
Start: 2019-09-08 | End: 2019-09-11 | Stop reason: HOSPADM

## 2019-09-08 RX ORDER — NITROGLYCERIN 0.4 MG/1
0.4 TABLET SUBLINGUAL EVERY 5 MIN PRN
Status: DISCONTINUED | OUTPATIENT
Start: 2019-09-08 | End: 2019-09-11 | Stop reason: HOSPADM

## 2019-09-08 RX ORDER — CITALOPRAM 20 MG/1
20 TABLET ORAL DAILY
COMMUNITY

## 2019-09-08 RX ORDER — ATORVASTATIN CALCIUM 40 MG/1
40 TABLET, FILM COATED ORAL NIGHTLY
Status: DISCONTINUED | OUTPATIENT
Start: 2019-09-08 | End: 2019-09-11 | Stop reason: HOSPADM

## 2019-09-08 RX ORDER — DEXLANSOPRAZOLE 60 MG/1
60 CAPSULE, DELAYED RELEASE ORAL DAILY
COMMUNITY

## 2019-09-08 RX ORDER — METHOCARBAMOL 750 MG/1
750 TABLET, FILM COATED ORAL 3 TIMES DAILY
Status: DISCONTINUED | OUTPATIENT
Start: 2019-09-08 | End: 2019-09-11 | Stop reason: HOSPADM

## 2019-09-08 RX ORDER — LISINOPRIL 20 MG/1
20 TABLET ORAL DAILY
Status: DISCONTINUED | OUTPATIENT
Start: 2019-09-08 | End: 2019-09-11 | Stop reason: HOSPADM

## 2019-09-08 RX ORDER — HYDROCODONE BITARTRATE AND ACETAMINOPHEN 7.5; 325 MG/1; MG/1
1 TABLET ORAL EVERY 6 HOURS PRN
Status: DISCONTINUED | OUTPATIENT
Start: 2019-09-08 | End: 2019-09-11 | Stop reason: HOSPADM

## 2019-09-08 RX ORDER — CARVEDILOL 3.12 MG/1
3.12 TABLET ORAL 2 TIMES DAILY WITH MEALS
Status: DISCONTINUED | OUTPATIENT
Start: 2019-09-08 | End: 2019-09-08

## 2019-09-08 RX ORDER — SODIUM CHLORIDE 0.9 % (FLUSH) 0.9 %
10 SYRINGE (ML) INJECTION PRN
Status: DISCONTINUED | OUTPATIENT
Start: 2019-09-08 | End: 2019-09-10 | Stop reason: SDUPTHER

## 2019-09-08 RX ORDER — CITALOPRAM 20 MG/1
20 TABLET ORAL DAILY
Status: DISCONTINUED | OUTPATIENT
Start: 2019-09-08 | End: 2019-09-11 | Stop reason: HOSPADM

## 2019-09-08 RX ORDER — ROPINIROLE 0.25 MG/1
0.25 TABLET, FILM COATED ORAL NIGHTLY
COMMUNITY

## 2019-09-08 RX ORDER — METHOCARBAMOL 750 MG/1
750 TABLET, FILM COATED ORAL 3 TIMES DAILY
COMMUNITY

## 2019-09-08 RX ORDER — HYDROCODONE BITARTRATE AND ACETAMINOPHEN 7.5; 325 MG/1; MG/1
1 TABLET ORAL 2 TIMES DAILY
COMMUNITY

## 2019-09-08 RX ADMIN — METHYLPREDNISOLONE SODIUM SUCCINATE 40 MG: 40 INJECTION, POWDER, FOR SOLUTION INTRAMUSCULAR; INTRAVENOUS at 15:49

## 2019-09-08 RX ADMIN — METHOCARBAMOL TABLETS 750 MG: 750 TABLET, COATED ORAL at 15:48

## 2019-09-08 RX ADMIN — ROPINIROLE HYDROCHLORIDE 0.25 MG: 0.25 TABLET, FILM COATED ORAL at 20:10

## 2019-09-08 RX ADMIN — FAMOTIDINE 20 MG: 10 INJECTION, SOLUTION INTRAVENOUS at 20:10

## 2019-09-08 RX ADMIN — ATORVASTATIN CALCIUM 40 MG: 40 TABLET, FILM COATED ORAL at 20:10

## 2019-09-08 RX ADMIN — HEPARIN SODIUM AND DEXTROSE 12 UNITS/KG/HR: 10000; 5 INJECTION INTRAVENOUS at 14:35

## 2019-09-08 RX ADMIN — METOPROLOL TARTRATE 25 MG: 25 TABLET ORAL at 20:10

## 2019-09-08 RX ADMIN — Medication 10 ML: at 20:10

## 2019-09-08 RX ADMIN — FAMOTIDINE 20 MG: 10 INJECTION, SOLUTION INTRAVENOUS at 15:49

## 2019-09-08 RX ADMIN — METHOCARBAMOL TABLETS 750 MG: 750 TABLET, COATED ORAL at 20:10

## 2019-09-08 SDOH — HEALTH STABILITY: MENTAL HEALTH: HOW OFTEN DO YOU HAVE A DRINK CONTAINING ALCOHOL?: NEVER

## 2019-09-08 ASSESSMENT — ENCOUNTER SYMPTOMS
SHORTNESS OF BREATH: 0
GASTROINTESTINAL NEGATIVE: 1
EYES NEGATIVE: 1
RESPIRATORY NEGATIVE: 1
DIARRHEA: 0
NAUSEA: 0
VOMITING: 0

## 2019-09-08 ASSESSMENT — PAIN SCALES - GENERAL
PAINLEVEL_OUTOF10: 0
PAINLEVEL_OUTOF10: 0

## 2019-09-09 ENCOUNTER — APPOINTMENT (OUTPATIENT)
Dept: NUCLEAR MEDICINE | Age: 84
End: 2019-09-09
Attending: INTERNAL MEDICINE
Payer: MEDICARE

## 2019-09-09 ENCOUNTER — APPOINTMENT (OUTPATIENT)
Dept: CT IMAGING | Age: 84
End: 2019-09-09
Attending: INTERNAL MEDICINE
Payer: MEDICARE

## 2019-09-09 PROBLEM — K59.09 CHRONIC CONSTIPATION: Status: ACTIVE | Noted: 2019-09-09

## 2019-09-09 PROBLEM — T84.84XA PAIN DUE TO HIP JOINT PROSTHESIS (HCC): Status: ACTIVE | Noted: 2019-09-09

## 2019-09-09 PROBLEM — Z96.649 PAIN DUE TO HIP JOINT PROSTHESIS (HCC): Status: ACTIVE | Noted: 2019-09-09

## 2019-09-09 PROBLEM — K21.00 GASTRO-ESOPHAGEAL REFLUX DISEASE WITH ESOPHAGITIS: Status: ACTIVE | Noted: 2019-09-09

## 2019-09-09 LAB
ANION GAP SERPL CALCULATED.3IONS-SCNC: 18 MMOL/L (ref 7–19)
APTT: 47.5 SEC (ref 26–36.2)
APTT: 55.7 SEC (ref 26–36.2)
APTT: 59 SEC (ref 26–36.2)
APTT: 99.5 SEC (ref 26–36.2)
BASOPHILS ABSOLUTE: 0.1 K/UL (ref 0–0.2)
BASOPHILS RELATIVE PERCENT: 0.9 % (ref 0–1)
BUN BLDV-MCNC: 21 MG/DL (ref 8–23)
CALCIUM SERPL-MCNC: 9.4 MG/DL (ref 8.8–10.2)
CHLORIDE BLD-SCNC: 96 MMOL/L (ref 98–111)
CHOLESTEROL, TOTAL: 185 MG/DL (ref 160–199)
CO2: 19 MMOL/L (ref 22–29)
CREAT SERPL-MCNC: 0.8 MG/DL (ref 0.5–0.9)
EKG P AXIS: 72 DEGREES
EKG P-R INTERVAL: 194 MS
EKG Q-T INTERVAL: 418 MS
EKG QRS DURATION: 92 MS
EKG QTC CALCULATION (BAZETT): 432 MS
EKG T AXIS: 46 DEGREES
EOSINOPHILS ABSOLUTE: 0 K/UL (ref 0–0.6)
EOSINOPHILS RELATIVE PERCENT: 0.3 % (ref 0–5)
GFR NON-AFRICAN AMERICAN: >60
GLUCOSE BLD-MCNC: 136 MG/DL (ref 74–109)
HCT VFR BLD CALC: 30.9 % (ref 37–47)
HDLC SERPL-MCNC: 59 MG/DL (ref 65–121)
HEMOGLOBIN: 10.5 G/DL (ref 12–16)
IMMATURE GRANULOCYTES #: 0 K/UL
LDL CHOLESTEROL CALCULATED: 113 MG/DL
LV EF: 58 %
LVEF MODALITY: NORMAL
LYMPHOCYTES ABSOLUTE: 1.6 K/UL (ref 1.1–4.5)
LYMPHOCYTES RELATIVE PERCENT: 24.6 % (ref 20–40)
MCH RBC QN AUTO: 33.1 PG (ref 27–31)
MCHC RBC AUTO-ENTMCNC: 34 G/DL (ref 33–37)
MCV RBC AUTO: 97.5 FL (ref 81–99)
MONOCYTES ABSOLUTE: 0.4 K/UL (ref 0–0.9)
MONOCYTES RELATIVE PERCENT: 6 % (ref 0–10)
NEUTROPHILS ABSOLUTE: 4.5 K/UL (ref 1.5–7.5)
NEUTROPHILS RELATIVE PERCENT: 67.9 % (ref 50–65)
PDW BLD-RTO: 12.2 % (ref 11.5–14.5)
PLATELET # BLD: 282 K/UL (ref 130–400)
PMV BLD AUTO: 9.7 FL (ref 9.4–12.3)
POTASSIUM REFLEX MAGNESIUM: 3.8 MMOL/L (ref 3.5–5)
RBC # BLD: 3.17 M/UL (ref 4.2–5.4)
SODIUM BLD-SCNC: 133 MMOL/L (ref 136–145)
TRIGL SERPL-MCNC: 63 MG/DL (ref 0–149)
TROPONIN: 0.02 NG/ML (ref 0–0.03)
WBC # BLD: 6.6 K/UL (ref 4.8–10.8)

## 2019-09-09 PROCEDURE — 2580000003 HC RX 258: Performed by: INTERNAL MEDICINE

## 2019-09-09 PROCEDURE — G0378 HOSPITAL OBSERVATION PER HR: HCPCS

## 2019-09-09 PROCEDURE — 3430000000 HC RX DIAGNOSTIC RADIOPHARMACEUTICAL: Performed by: INTERNAL MEDICINE

## 2019-09-09 PROCEDURE — 85730 THROMBOPLASTIN TIME PARTIAL: CPT

## 2019-09-09 PROCEDURE — 85025 COMPLETE CBC W/AUTO DIFF WBC: CPT

## 2019-09-09 PROCEDURE — 93017 CV STRESS TEST TRACING ONLY: CPT

## 2019-09-09 PROCEDURE — 84484 ASSAY OF TROPONIN QUANT: CPT

## 2019-09-09 PROCEDURE — 78452 HT MUSCLE IMAGE SPECT MULT: CPT

## 2019-09-09 PROCEDURE — 80048 BASIC METABOLIC PNL TOTAL CA: CPT

## 2019-09-09 PROCEDURE — 71275 CT ANGIOGRAPHY CHEST: CPT

## 2019-09-09 PROCEDURE — 6370000000 HC RX 637 (ALT 250 FOR IP): Performed by: INTERNAL MEDICINE

## 2019-09-09 PROCEDURE — 80061 LIPID PANEL: CPT

## 2019-09-09 PROCEDURE — 6360000002 HC RX W HCPCS: Performed by: INTERNAL MEDICINE

## 2019-09-09 PROCEDURE — 96376 TX/PRO/DX INJ SAME DRUG ADON: CPT

## 2019-09-09 PROCEDURE — 2500000003 HC RX 250 WO HCPCS: Performed by: INTERNAL MEDICINE

## 2019-09-09 PROCEDURE — 93306 TTE W/DOPPLER COMPLETE: CPT

## 2019-09-09 PROCEDURE — 36415 COLL VENOUS BLD VENIPUNCTURE: CPT

## 2019-09-09 PROCEDURE — 6360000004 HC RX CONTRAST MEDICATION: Performed by: INTERNAL MEDICINE

## 2019-09-09 PROCEDURE — 99214 OFFICE O/P EST MOD 30 MIN: CPT | Performed by: INTERNAL MEDICINE

## 2019-09-09 PROCEDURE — 93005 ELECTROCARDIOGRAM TRACING: CPT

## 2019-09-09 PROCEDURE — A9500 TC99M SESTAMIBI: HCPCS | Performed by: INTERNAL MEDICINE

## 2019-09-09 RX ORDER — ALPRAZOLAM 0.5 MG/1
0.5 TABLET ORAL
Status: DISCONTINUED | OUTPATIENT
Start: 2019-09-10 | End: 2019-09-10 | Stop reason: SDUPTHER

## 2019-09-09 RX ORDER — SODIUM CHLORIDE 9 MG/ML
INJECTION, SOLUTION INTRAVENOUS CONTINUOUS
Status: DISCONTINUED | OUTPATIENT
Start: 2019-09-10 | End: 2019-09-10 | Stop reason: SDUPTHER

## 2019-09-09 RX ADMIN — FAMOTIDINE 20 MG: 10 INJECTION, SOLUTION INTRAVENOUS at 13:49

## 2019-09-09 RX ADMIN — HEPARIN SODIUM AND DEXTROSE 11 UNITS/KG/HR: 10000; 5 INJECTION INTRAVENOUS at 22:42

## 2019-09-09 RX ADMIN — HEPARIN SODIUM AND DEXTROSE 13 UNITS/KG/HR: 10000; 5 INJECTION INTRAVENOUS at 15:05

## 2019-09-09 RX ADMIN — HEPARIN SODIUM 1850 UNITS: 1000 INJECTION INTRAVENOUS; SUBCUTANEOUS at 15:04

## 2019-09-09 RX ADMIN — REGADENOSON 0.4 MG: 0.08 INJECTION, SOLUTION INTRAVENOUS at 11:30

## 2019-09-09 RX ADMIN — ATORVASTATIN CALCIUM 40 MG: 40 TABLET, FILM COATED ORAL at 20:00

## 2019-09-09 RX ADMIN — HYDROCHLOROTHIAZIDE 25 MG: 25 TABLET ORAL at 13:47

## 2019-09-09 RX ADMIN — TETRAKIS(2-METHOXYISOBUTYLISOCYANIDE)COPPER(I) TETRAFLUOROBORATE 10 MILLICURIE: 1 INJECTION, POWDER, LYOPHILIZED, FOR SOLUTION INTRAVENOUS at 15:31

## 2019-09-09 RX ADMIN — METHOCARBAMOL TABLETS 750 MG: 750 TABLET, COATED ORAL at 13:48

## 2019-09-09 RX ADMIN — CITALOPRAM HYDROBROMIDE 20 MG: 20 TABLET ORAL at 13:48

## 2019-09-09 RX ADMIN — TETRAKIS(2-METHOXYISOBUTYLISOCYANIDE)COPPER(I) TETRAFLUOROBORATE 30 MILLICURIE: 1 INJECTION, POWDER, LYOPHILIZED, FOR SOLUTION INTRAVENOUS at 15:32

## 2019-09-09 RX ADMIN — METOPROLOL TARTRATE 25 MG: 25 TABLET ORAL at 13:48

## 2019-09-09 RX ADMIN — FAMOTIDINE 20 MG: 10 INJECTION, SOLUTION INTRAVENOUS at 20:01

## 2019-09-09 RX ADMIN — Medication 10 ML: at 20:01

## 2019-09-09 RX ADMIN — HEPARIN SODIUM AND DEXTROSE 13 UNITS/KG/HR: 10000; 5 INJECTION INTRAVENOUS at 19:06

## 2019-09-09 RX ADMIN — METOPROLOL TARTRATE 25 MG: 25 TABLET ORAL at 20:01

## 2019-09-09 RX ADMIN — ASPIRIN 81 MG 81 MG: 81 TABLET ORAL at 13:48

## 2019-09-09 RX ADMIN — LISINOPRIL 20 MG: 20 TABLET ORAL at 13:48

## 2019-09-09 RX ADMIN — Medication 10 ML: at 13:49

## 2019-09-09 RX ADMIN — IOPAMIDOL 90 ML: 755 INJECTION, SOLUTION INTRAVENOUS at 16:13

## 2019-09-09 RX ADMIN — ROPINIROLE HYDROCHLORIDE 0.25 MG: 0.25 TABLET, FILM COATED ORAL at 20:00

## 2019-09-09 RX ADMIN — METHOCARBAMOL TABLETS 750 MG: 750 TABLET, COATED ORAL at 20:01

## 2019-09-09 ASSESSMENT — PAIN SCALES - GENERAL: PAINLEVEL_OUTOF10: 0

## 2019-09-09 NOTE — PROGRESS NOTES
Cardiology Daily Note Víctor Peres MD      Patient:  Felicia Kent  [id number]    Patient Active Problem List    Diagnosis Date Noted    Gastro-esophageal reflux disease with esophagitis 09/09/2019     Priority: Low    Pain due to hip joint prosthesis ([facility] [address] 09/09/2019     Priority: Low    Degeneration of intervertebral disc 09/09/2019     Priority: Low    Chronic constipation 09/09/2019     Priority: Low    Chest pain 09/08/2019     Priority: Low    Hypertension      Priority: Low    Arthritis      Priority: Low    Atrial fibrillation ([facility] [address] 01/11/2016     Priority: Low    GI bleeding 01/11/2016     Priority: Low    Anemia 01/11/2016     Priority: Low       Admit Date:  9/8/2019    Admission Problem List: Present on Admission:   Chest pain   Hypertension   Arthritis   Atrial fibrillation ([facility] [address] [facility]   Gastro-esophageal reflux disease with esophagitis      Cardiac Specific Data:  Specialty Problems        Cardiology Problems    Atrial fibrillation ([facility] [address]        Hypertension              Subjective:  Ms. Maura Ramos seen today resting comfortably. Some dyspnea reported this morning and still with pleuritic chest pain. Troponins are negative. D-dimer elevated 0.57. No other complaints. Objective:   /68   Pulse 65   Temp 97.1 °F (36.2 °C) (Temporal)   Resp 16   Ht 5' 4\" (1.626 m)   Wt 134 lb 3.2 oz (60.9 kg)   SpO2 98%   BMI 23.04 kg/m²       Intake/Output Summary (Last 24 hours) at 9/9/2019 0926  Last data filed at 9/9/2019 8031  Gross per 24 hour   Intake 240 ml   Output 800 ml   Net -560 ml       Prior to Admission medications    Medication Sig Start Date End Date Taking?  Authorizing Provider   lisinopril (PRINIVIL;ZESTRIL) 20 MG tablet Take 20 mg by mouth daily   Yes Historical Provider, MD   hydrochlorothiazide (HYDRODIURIL) 25 MG tablet Take 25 mg by mouth daily   Yes Historical Provider, MD   rOPINIRole (REQUIP) 0.25 MG tablet Take 0.25 mg by mouth nightly   Yes Historical Provider, MD CREATININE 0.7 0.8     LIVER PROFILE:   Recent Labs     09/08/19  1310   AST 17   ALT 10   BILITOT 0.3   ALKPHOS 60     PT/INR: No results for input(s): PROTIME, INR in the last 72 hours. APTT:   Recent Labs     09/08/19  1920 09/09/19  0118 09/09/19  0653   APTT 64.4* 59.0* 55.7*     BNP:  No results for input(s): BNP in the last 72 hours. CK, CKMB, Troponin: @LABRCNT (CKTOTAL:3, CKMB:3, TROPONINI:3)@    IMAGING:  No results found. Assessment:  1. Atypical chest pain pleuritic onset this a.m. 0500 but recurrent intermittently over the past 5 years no typical angina  2. Hypertension  3. Hyperlipidemia  4. Hiatal hernia  5. History of atrial fibrillation several years ago converted  6. Elevated d-dimer    Plan:  1.  Continue diagnostic work-up would also add CTA pulmonary    Lizzie Bray MD 9/9/2019 9:26 AM

## 2019-09-10 LAB
ALBUMIN SERPL-MCNC: 4 G/DL (ref 3.5–5.2)
ALP BLD-CCNC: 54 U/L (ref 35–104)
ALT SERPL-CCNC: 13 U/L (ref 5–33)
ANION GAP SERPL CALCULATED.3IONS-SCNC: 14 MMOL/L (ref 7–19)
ANION GAP SERPL CALCULATED.3IONS-SCNC: 15 MMOL/L (ref 7–19)
APTT: 28.5 SEC (ref 26–36.2)
APTT: 47.8 SEC (ref 26–36.2)
AST SERPL-CCNC: 25 U/L (ref 5–32)
BASOPHILS ABSOLUTE: 0.1 K/UL (ref 0–0.2)
BASOPHILS RELATIVE PERCENT: 1 % (ref 0–1)
BILIRUB SERPL-MCNC: 0.3 MG/DL (ref 0.2–1.2)
BUN BLDV-MCNC: 16 MG/DL (ref 8–23)
BUN BLDV-MCNC: 17 MG/DL (ref 8–23)
CALCIUM SERPL-MCNC: 9.1 MG/DL (ref 8.8–10.2)
CALCIUM SERPL-MCNC: 9.4 MG/DL (ref 8.8–10.2)
CHLORIDE BLD-SCNC: 100 MMOL/L (ref 98–111)
CHLORIDE BLD-SCNC: 98 MMOL/L (ref 98–111)
CHOLESTEROL, TOTAL: 158 MG/DL (ref 160–199)
CO2: 21 MMOL/L (ref 22–29)
CO2: 21 MMOL/L (ref 22–29)
CREAT SERPL-MCNC: 0.7 MG/DL (ref 0.5–0.9)
CREAT SERPL-MCNC: 1 MG/DL (ref 0.5–0.9)
EKG P AXIS: 81 DEGREES
EKG P-R INTERVAL: 182 MS
EKG Q-T INTERVAL: 434 MS
EKG QRS DURATION: 90 MS
EKG QTC CALCULATION (BAZETT): 449 MS
EKG T AXIS: 61 DEGREES
EOSINOPHILS ABSOLUTE: 1.1 K/UL (ref 0–0.6)
EOSINOPHILS RELATIVE PERCENT: 10.6 % (ref 0–5)
GFR NON-AFRICAN AMERICAN: 53
GFR NON-AFRICAN AMERICAN: >60
GLUCOSE BLD-MCNC: 109 MG/DL (ref 74–109)
GLUCOSE BLD-MCNC: 111 MG/DL (ref 74–109)
HCT VFR BLD CALC: 29.9 % (ref 37–47)
HCT VFR BLD CALC: 31.2 % (ref 37–47)
HDLC SERPL-MCNC: 62 MG/DL (ref 65–121)
HEMOGLOBIN: 10.1 G/DL (ref 12–16)
HEMOGLOBIN: 10.5 G/DL (ref 12–16)
IMMATURE GRANULOCYTES #: 0 K/UL
LDL CHOLESTEROL CALCULATED: 88 MG/DL
LV EF: 82 %
LVEF MODALITY: NORMAL
LYMPHOCYTES ABSOLUTE: 2.5 K/UL (ref 1.1–4.5)
LYMPHOCYTES RELATIVE PERCENT: 24.5 % (ref 20–40)
MAGNESIUM: 1.8 MG/DL (ref 1.6–2.4)
MCH RBC QN AUTO: 33.3 PG (ref 27–31)
MCH RBC QN AUTO: 33.4 PG (ref 27–31)
MCHC RBC AUTO-ENTMCNC: 33.7 G/DL (ref 33–37)
MCHC RBC AUTO-ENTMCNC: 33.8 G/DL (ref 33–37)
MCV RBC AUTO: 98.7 FL (ref 81–99)
MCV RBC AUTO: 99.4 FL (ref 81–99)
MONOCYTES ABSOLUTE: 0.8 K/UL (ref 0–0.9)
MONOCYTES RELATIVE PERCENT: 8.2 % (ref 0–10)
NEUTROPHILS ABSOLUTE: 5.7 K/UL (ref 1.5–7.5)
NEUTROPHILS RELATIVE PERCENT: 55.3 % (ref 50–65)
PDW BLD-RTO: 12.5 % (ref 11.5–14.5)
PDW BLD-RTO: 12.5 % (ref 11.5–14.5)
PLATELET # BLD: 262 K/UL (ref 130–400)
PLATELET # BLD: 271 K/UL (ref 130–400)
PMV BLD AUTO: 9.2 FL (ref 9.4–12.3)
PMV BLD AUTO: 9.6 FL (ref 9.4–12.3)
POTASSIUM REFLEX MAGNESIUM: 3.3 MMOL/L (ref 3.5–5)
POTASSIUM SERPL-SCNC: 3.4 MMOL/L (ref 3.5–5)
RBC # BLD: 3.03 M/UL (ref 4.2–5.4)
RBC # BLD: 3.14 M/UL (ref 4.2–5.4)
SODIUM BLD-SCNC: 134 MMOL/L (ref 136–145)
SODIUM BLD-SCNC: 135 MMOL/L (ref 136–145)
TOTAL PROTEIN: 7 G/DL (ref 6.6–8.7)
TRIGL SERPL-MCNC: 40 MG/DL (ref 0–149)
WBC # BLD: 10.2 K/UL (ref 4.8–10.8)
WBC # BLD: 8.6 K/UL (ref 4.8–10.8)

## 2019-09-10 PROCEDURE — 6370000000 HC RX 637 (ALT 250 FOR IP): Performed by: PHYSICIAN ASSISTANT

## 2019-09-10 PROCEDURE — G0378 HOSPITAL OBSERVATION PER HR: HCPCS

## 2019-09-10 PROCEDURE — 6360000002 HC RX W HCPCS: Performed by: INTERNAL MEDICINE

## 2019-09-10 PROCEDURE — 80053 COMPREHEN METABOLIC PANEL: CPT

## 2019-09-10 PROCEDURE — 2709999900 HC NON-CHARGEABLE SUPPLY

## 2019-09-10 PROCEDURE — 85730 THROMBOPLASTIN TIME PARTIAL: CPT

## 2019-09-10 PROCEDURE — 2500000003 HC RX 250 WO HCPCS: Performed by: INTERNAL MEDICINE

## 2019-09-10 PROCEDURE — 93005 ELECTROCARDIOGRAM TRACING: CPT

## 2019-09-10 PROCEDURE — 96376 TX/PRO/DX INJ SAME DRUG ADON: CPT

## 2019-09-10 PROCEDURE — 6360000004 HC RX CONTRAST MEDICATION: Performed by: INTERNAL MEDICINE

## 2019-09-10 PROCEDURE — 99214 OFFICE O/P EST MOD 30 MIN: CPT | Performed by: INTERNAL MEDICINE

## 2019-09-10 PROCEDURE — 2580000003 HC RX 258: Performed by: INTERNAL MEDICINE

## 2019-09-10 PROCEDURE — 6370000000 HC RX 637 (ALT 250 FOR IP): Performed by: INTERNAL MEDICINE

## 2019-09-10 PROCEDURE — 85027 COMPLETE CBC AUTOMATED: CPT

## 2019-09-10 PROCEDURE — 99152 MOD SED SAME PHYS/QHP 5/>YRS: CPT

## 2019-09-10 PROCEDURE — 6360000002 HC RX W HCPCS

## 2019-09-10 PROCEDURE — 93458 L HRT ARTERY/VENTRICLE ANGIO: CPT | Performed by: INTERNAL MEDICINE

## 2019-09-10 PROCEDURE — 93458 L HRT ARTERY/VENTRICLE ANGIO: CPT

## 2019-09-10 PROCEDURE — 2500000003 HC RX 250 WO HCPCS

## 2019-09-10 PROCEDURE — 83735 ASSAY OF MAGNESIUM: CPT

## 2019-09-10 PROCEDURE — 85025 COMPLETE CBC W/AUTO DIFF WBC: CPT

## 2019-09-10 PROCEDURE — 36415 COLL VENOUS BLD VENIPUNCTURE: CPT

## 2019-09-10 PROCEDURE — 80061 LIPID PANEL: CPT

## 2019-09-10 PROCEDURE — C1894 INTRO/SHEATH, NON-LASER: HCPCS

## 2019-09-10 RX ORDER — ONDANSETRON 2 MG/ML
4 INJECTION INTRAMUSCULAR; INTRAVENOUS EVERY 6 HOURS PRN
Status: DISCONTINUED | OUTPATIENT
Start: 2019-09-10 | End: 2019-09-11 | Stop reason: HOSPADM

## 2019-09-10 RX ORDER — IODIXANOL 320 MG/ML
100 INJECTION, SOLUTION INTRAVASCULAR
Status: COMPLETED | OUTPATIENT
Start: 2019-09-10 | End: 2019-09-10

## 2019-09-10 RX ORDER — SODIUM CHLORIDE 0.9 % (FLUSH) 0.9 %
10 SYRINGE (ML) INJECTION EVERY 12 HOURS SCHEDULED
Status: DISCONTINUED | OUTPATIENT
Start: 2019-09-10 | End: 2019-09-11 | Stop reason: HOSPADM

## 2019-09-10 RX ORDER — SODIUM CHLORIDE 0.9 % (FLUSH) 0.9 %
10 SYRINGE (ML) INJECTION PRN
Status: DISCONTINUED | OUTPATIENT
Start: 2019-09-10 | End: 2019-09-11 | Stop reason: HOSPADM

## 2019-09-10 RX ORDER — ASPIRIN 81 MG/1
81 TABLET ORAL ONCE
Status: COMPLETED | OUTPATIENT
Start: 2019-09-10 | End: 2019-09-10

## 2019-09-10 RX ORDER — POTASSIUM CHLORIDE 20 MEQ/1
40 TABLET, EXTENDED RELEASE ORAL PRN
Status: DISCONTINUED | OUTPATIENT
Start: 2019-09-10 | End: 2019-09-11 | Stop reason: HOSPADM

## 2019-09-10 RX ORDER — ACETAMINOPHEN 325 MG/1
650 TABLET ORAL EVERY 4 HOURS PRN
Status: DISCONTINUED | OUTPATIENT
Start: 2019-09-10 | End: 2019-09-11 | Stop reason: HOSPADM

## 2019-09-10 RX ORDER — HYDRALAZINE HYDROCHLORIDE 20 MG/ML
10 INJECTION INTRAMUSCULAR; INTRAVENOUS EVERY 10 MIN PRN
Status: DISCONTINUED | OUTPATIENT
Start: 2019-09-10 | End: 2019-09-11 | Stop reason: HOSPADM

## 2019-09-10 RX ORDER — SODIUM CHLORIDE 9 MG/ML
1000 INJECTION, SOLUTION INTRAVENOUS CONTINUOUS
Status: DISCONTINUED | OUTPATIENT
Start: 2019-09-10 | End: 2019-09-11 | Stop reason: HOSPADM

## 2019-09-10 RX ORDER — ALPRAZOLAM 0.5 MG/1
0.5 TABLET ORAL
Status: ACTIVE | OUTPATIENT
Start: 2019-09-10 | End: 2019-09-10

## 2019-09-10 RX ORDER — POTASSIUM CHLORIDE 7.45 MG/ML
10 INJECTION INTRAVENOUS PRN
Status: DISCONTINUED | OUTPATIENT
Start: 2019-09-10 | End: 2019-09-11 | Stop reason: HOSPADM

## 2019-09-10 RX ORDER — SODIUM CHLORIDE 9 MG/ML
INJECTION, SOLUTION INTRAVENOUS CONTINUOUS
Status: DISCONTINUED | OUTPATIENT
Start: 2019-09-10 | End: 2019-09-10

## 2019-09-10 RX ADMIN — HEPARIN SODIUM AND DEXTROSE 13 UNITS/KG/HR: 10000; 5 INJECTION INTRAVENOUS at 05:48

## 2019-09-10 RX ADMIN — ROPINIROLE HYDROCHLORIDE 0.25 MG: 0.25 TABLET, FILM COATED ORAL at 21:24

## 2019-09-10 RX ADMIN — METOPROLOL TARTRATE 25 MG: 25 TABLET ORAL at 21:24

## 2019-09-10 RX ADMIN — HYDROCODONE BITARTRATE AND ACETAMINOPHEN 1 TABLET: 7.5; 325 TABLET ORAL at 21:24

## 2019-09-10 RX ADMIN — HEPARIN SODIUM 3700 UNITS: 1000 INJECTION INTRAVENOUS; SUBCUTANEOUS at 05:47

## 2019-09-10 RX ADMIN — LISINOPRIL 20 MG: 20 TABLET ORAL at 09:23

## 2019-09-10 RX ADMIN — FAMOTIDINE 20 MG: 10 INJECTION, SOLUTION INTRAVENOUS at 09:23

## 2019-09-10 RX ADMIN — SODIUM CHLORIDE: 9 INJECTION, SOLUTION INTRAVENOUS at 09:22

## 2019-09-10 RX ADMIN — METHOCARBAMOL TABLETS 750 MG: 750 TABLET, COATED ORAL at 14:00

## 2019-09-10 RX ADMIN — Medication 10 ML: at 21:24

## 2019-09-10 RX ADMIN — SODIUM CHLORIDE 1000 ML: 9 INJECTION, SOLUTION INTRAVENOUS at 13:00

## 2019-09-10 RX ADMIN — METHOCARBAMOL TABLETS 750 MG: 750 TABLET, COATED ORAL at 09:22

## 2019-09-10 RX ADMIN — METOPROLOL TARTRATE 25 MG: 25 TABLET ORAL at 09:22

## 2019-09-10 RX ADMIN — POTASSIUM BICARBONATE 40 MEQ: 782 TABLET, EFFERVESCENT ORAL at 15:56

## 2019-09-10 RX ADMIN — METHOCARBAMOL TABLETS 750 MG: 750 TABLET, COATED ORAL at 21:24

## 2019-09-10 RX ADMIN — IODIXANOL 106 ML: 320 INJECTION, SOLUTION INTRAVASCULAR at 12:12

## 2019-09-10 RX ADMIN — HYDROCHLOROTHIAZIDE 25 MG: 25 TABLET ORAL at 09:22

## 2019-09-10 RX ADMIN — ASPIRIN 81 MG: 81 TABLET, COATED ORAL at 11:22

## 2019-09-10 RX ADMIN — ATORVASTATIN CALCIUM 40 MG: 40 TABLET, FILM COATED ORAL at 21:24

## 2019-09-10 RX ADMIN — SODIUM CHLORIDE 1000 ML: 9 INJECTION, SOLUTION INTRAVENOUS at 21:26

## 2019-09-10 RX ADMIN — CITALOPRAM HYDROBROMIDE 20 MG: 20 TABLET ORAL at 09:31

## 2019-09-10 ASSESSMENT — PAIN DESCRIPTION - LOCATION
LOCATION: BACK
LOCATION: BACK

## 2019-09-10 ASSESSMENT — PAIN SCALES - GENERAL
PAINLEVEL_OUTOF10: 0
PAINLEVEL_OUTOF10: 6

## 2019-09-10 ASSESSMENT — PAIN DESCRIPTION - PAIN TYPE
TYPE: CHRONIC PAIN
TYPE: CHRONIC PAIN

## 2019-09-10 NOTE — PROGRESS NOTES
09/10/19  0035    133* 134*   K 3.7 3.8 3.4*   CL 99 96* 98   CO2 24 19* 21*   BUN 18 21 17   CREATININE 0.7 0.8 1.0*     LIVER PROFILE:   Recent Labs     09/08/19  1310   AST 17   ALT 10   BILITOT 0.3   ALKPHOS 60     PT/INR: No results for input(s): PROTIME, INR in the last 72 hours. APTT:   Recent Labs     09/09/19  1948 09/10/19  0035 09/10/19  0413   APTT 99.5* 47.8* 28.5     BNP:  No results for input(s): BNP in the last 72 hours. CK, CKMB, Troponin: @LABRCNT (CKTOTAL:3, CKMB:3, TROPONINI:3)@    IMAGING:  Cta Pulmonary W Contrast    Result Date: 9/9/2019  Examination. CTA PULMONARY W CONTRAST History: Elevated d-dimer. DLP: 445 mGycm. The CT angiography of the chest is performed after intravenous contrast enhancement. The images are acquired in axial plane with subsequent 2-D reconstruction coronal and sagittal planes and 3-D maximum intensity projection reconstruction. There is no previous study for comparison. There is excellent opacification of pulmonary arteries and the branches bilaterally. There is no filling defect in the opacified pulmonary arterial bed. Severe atheromatous changes of the thoracic aorta are seen. No aneurysmal dilatation. Atheromatous changes of coronary arteries are seen. There is no evidence of mediastinal or hilar mass or lymphadenopathy. There are large calcified granulomas in the mediastinum and feng bilaterally. There are small nodules in both lobes of the thyroid gland. There is no exiting lymphadenopathy. There is a tiny noncalcified nodule in the right upper lobe, image #21 in axial plane measuring 2 mm in diameter. There are several calcified granulomas bilaterally. The scattered areas of scarring and pleural thickening and discoid atelectasis are seen in the lungs bilaterally more so the left lower lung. No areas of focal consolidation or infiltrate. There is a small hiatal hernia. The visualized liver and spleen appear unremarkable.  There are multiple granulomas in

## 2019-09-10 NOTE — PROGRESS NOTES
Cardiac cath note preliminary normal left ventricular function minimal nonobstructive disease tolerated well right radial artery approach could be discharged today or tomorrow from my perspective

## 2019-09-11 VITALS
OXYGEN SATURATION: 94 % | HEART RATE: 63 BPM | BODY MASS INDEX: 23.12 KG/M2 | HEIGHT: 64 IN | WEIGHT: 135.4 LBS | RESPIRATION RATE: 16 BRPM | SYSTOLIC BLOOD PRESSURE: 112 MMHG | TEMPERATURE: 97.8 F | DIASTOLIC BLOOD PRESSURE: 68 MMHG

## 2019-09-11 LAB
ANION GAP SERPL CALCULATED.3IONS-SCNC: 15 MMOL/L (ref 7–19)
BASOPHILS ABSOLUTE: 0.1 K/UL (ref 0–0.2)
BASOPHILS RELATIVE PERCENT: 0.9 % (ref 0–1)
BUN BLDV-MCNC: 15 MG/DL (ref 8–23)
CALCIUM SERPL-MCNC: 9.5 MG/DL (ref 8.8–10.2)
CHLORIDE BLD-SCNC: 98 MMOL/L (ref 98–111)
CO2: 23 MMOL/L (ref 22–29)
CREAT SERPL-MCNC: 0.8 MG/DL (ref 0.5–0.9)
EOSINOPHILS ABSOLUTE: 1.9 K/UL (ref 0–0.6)
EOSINOPHILS RELATIVE PERCENT: 18.5 % (ref 0–5)
GFR NON-AFRICAN AMERICAN: >60
GLUCOSE BLD-MCNC: 96 MG/DL (ref 74–109)
HCT VFR BLD CALC: 29.4 % (ref 37–47)
HEMOGLOBIN: 9.8 G/DL (ref 12–16)
IMMATURE GRANULOCYTES #: 0 K/UL
LYMPHOCYTES ABSOLUTE: 3 K/UL (ref 1.1–4.5)
LYMPHOCYTES RELATIVE PERCENT: 29.7 % (ref 20–40)
MCH RBC QN AUTO: 33.3 PG (ref 27–31)
MCHC RBC AUTO-ENTMCNC: 33.3 G/DL (ref 33–37)
MCV RBC AUTO: 100 FL (ref 81–99)
MONOCYTES ABSOLUTE: 0.8 K/UL (ref 0–0.9)
MONOCYTES RELATIVE PERCENT: 8.2 % (ref 0–10)
NEUTROPHILS ABSOLUTE: 4.3 K/UL (ref 1.5–7.5)
NEUTROPHILS RELATIVE PERCENT: 42.4 % (ref 50–65)
PDW BLD-RTO: 12.6 % (ref 11.5–14.5)
PLATELET # BLD: 242 K/UL (ref 130–400)
PMV BLD AUTO: 9.1 FL (ref 9.4–12.3)
POTASSIUM SERPL-SCNC: 4.8 MMOL/L (ref 3.5–5)
RBC # BLD: 2.94 M/UL (ref 4.2–5.4)
SODIUM BLD-SCNC: 136 MMOL/L (ref 136–145)
WBC # BLD: 10.1 K/UL (ref 4.8–10.8)

## 2019-09-11 PROCEDURE — G0378 HOSPITAL OBSERVATION PER HR: HCPCS

## 2019-09-11 PROCEDURE — 36415 COLL VENOUS BLD VENIPUNCTURE: CPT

## 2019-09-11 PROCEDURE — 85025 COMPLETE CBC W/AUTO DIFF WBC: CPT

## 2019-09-11 PROCEDURE — 80048 BASIC METABOLIC PNL TOTAL CA: CPT

## 2019-09-11 RX ORDER — HYDROCODONE BITARTRATE AND ACETAMINOPHEN 5; 325 MG/1; MG/1
1 TABLET ORAL ONCE
Status: DISCONTINUED | OUTPATIENT
Start: 2019-09-11 | End: 2019-09-11 | Stop reason: HOSPADM

## 2019-09-11 RX ORDER — LANOLIN ALCOHOL/MO/W.PET/CERES
3 CREAM (GRAM) TOPICAL NIGHTLY PRN
Status: DISCONTINUED | OUTPATIENT
Start: 2019-09-11 | End: 2019-09-11 | Stop reason: HOSPADM

## 2019-09-11 RX ORDER — ASPIRIN 81 MG/1
81 TABLET, CHEWABLE ORAL DAILY
Qty: 30 TABLET | Refills: 0 | Status: SHIPPED | OUTPATIENT
Start: 2019-09-11

## 2019-09-11 NOTE — DISCHARGE SUMMARY
09/11/19  0251   WBC 10.2 8.6 10.1   HGB 10.5* 10.1* 9.8*    262 242     BMP:    Recent Labs     09/10/19  0035 09/10/19  1302 09/11/19  0251   * 135* 136   K 3.4* 3.3* 4.8   CL 98 100 98   CO2 21* 21* 23   BUN 17 16 15   CREATININE 1.0* 0.7 0.8   GLUCOSE 111* 109 96       Physical Exam:  Vital Signs: /68   Pulse 80   Temp 97.8 °F (36.6 °C) (Temporal)   Resp 16   Ht 5' 4\" (1.626 m)   Wt 135 lb 6.4 oz (61.4 kg)   SpO2 94%   BMI 23.24 kg/m²   General appearance:. Alert and Cooperative   HEENT: Normocephalic. Chest: clear to auscultation bilaterally without wheezes or rhonchi. Cardiac: Normal heart tones with regular rate and rhythm, S1, S2 normal. No murmurs, gallops, or rubs auscultated. Abdomen:soft, non-tender; non-distended normal bowel sounds no masses, no organomegaly. Extremities: No clubbing or cyanosis. No peripheral edema. Peripheral pulses palpable. Neurologic: Grossly intact. Discharge Medications:       [place], [facility] Medication Instructions AYSE:[id number]    Printed on:09/11/19 5143   Medication Information                      aspirin 81 MG chewable tablet  Take 1 tablet by mouth daily             citalopram (CELEXA) 20 MG tablet  Take 20 mg by mouth daily             conjugated estrogens (PREMARIN) 0.625 MG/GM vaginal cream  Place vaginally Twice a Week             dexlansoprazole (DEXILANT) 60 MG CPDR delayed release capsule  Take 60 mg by mouth daily             hydrochlorothiazide (HYDRODIURIL) 25 MG tablet  Take 25 mg by mouth daily             HYDROcodone-acetaminophen (NORCO) 7.5-325 MG per tablet  Take 1 tablet by mouth 4 times daily as needed for Pain.              lisinopril (PRINIVIL;ZESTRIL) 20 MG tablet  Take 20 mg by mouth daily             methocarbamol (ROBAXIN) 750 MG tablet  Take 750 mg by mouth 3 times daily             metoprolol tartrate (LOPRESSOR) 25 MG tablet  Take 25 mg by mouth 2 times daily             nabumetone (RELAFEN) 750 MG tablet  Take 750 mg by mouth 2 times daily             rivaroxaban (XARELTO) 15 MG TABS tablet  Take 15 mg by mouth daily (with breakfast)             rOPINIRole (REQUIP) 0.25 MG tablet  Take 0.25 mg by mouth nightly                 Discharge Instructions: Follow up with Jace Schreiber MD in 3-5 days. Take medications as directed. Resume activity as tolerated. Diet: DIET CARDIAC; No Caffeine     Disposition: Patient is medically stable and will be discharged home. Time spent on discharge 25 minutes spent in assessing patient, reviewing medications, discussion with nursing, confirming safe discharge plan and preparation of discharge summary.     Signed:  Jersey Casanova PA-C

## 2019-10-24 ENCOUNTER — OFFICE VISIT (OUTPATIENT)
Dept: CARDIOLOGY | Age: 84
End: 2019-10-24
Payer: MEDICARE

## 2019-10-24 VITALS
SYSTOLIC BLOOD PRESSURE: 138 MMHG | HEIGHT: 60 IN | WEIGHT: 141 LBS | BODY MASS INDEX: 27.68 KG/M2 | DIASTOLIC BLOOD PRESSURE: 70 MMHG | HEART RATE: 76 BPM

## 2019-10-24 DIAGNOSIS — I48.0 PAROXYSMAL ATRIAL FIBRILLATION (HCC): Primary | ICD-10-CM

## 2019-10-24 DIAGNOSIS — Z79.01 CHRONIC ANTICOAGULATION: ICD-10-CM

## 2019-10-24 PROCEDURE — 99213 OFFICE O/P EST LOW 20 MIN: CPT | Performed by: INTERNAL MEDICINE

## 2019-10-24 RX ORDER — ACETAMINOPHEN 500 MG
500 TABLET ORAL PRN
COMMUNITY

## 2019-10-24 RX ORDER — DIPHENHYDRAMINE HCL 25 MG
25 TABLET ORAL DAILY
COMMUNITY

## 2019-10-24 ASSESSMENT — ENCOUNTER SYMPTOMS
VOMITING: 0
RESPIRATORY NEGATIVE: 1
GASTROINTESTINAL NEGATIVE: 1
EYES NEGATIVE: 1
SHORTNESS OF BREATH: 0
DIARRHEA: 0
NAUSEA: 0

## 2023-03-30 ENCOUNTER — HOME HEALTH ADMISSION (OUTPATIENT)
Dept: HOME HEALTH SERVICES | Facility: HOME HEALTHCARE | Age: 88
End: 2023-03-30
Payer: MEDICARE

## 2023-04-14 ENCOUNTER — HOME HEALTH ADMISSION (OUTPATIENT)
Dept: HOME HEALTH SERVICES | Facility: HOME HEALTHCARE | Age: 88
End: 2023-04-14
Payer: MEDICARE

## 2023-07-08 PROBLEM — K92.1 MELENA: Status: ACTIVE | Noted: 2023-07-08

## 2023-07-09 PROBLEM — G25.81 RLS (RESTLESS LEGS SYNDROME): Status: ACTIVE | Noted: 2023-07-09

## 2023-07-09 PROBLEM — D62 ACUTE BLOOD LOSS ANEMIA: Status: ACTIVE | Noted: 2023-07-09

## 2023-07-09 PROBLEM — I73.9 PAD (PERIPHERAL ARTERY DISEASE): Status: ACTIVE | Noted: 2023-07-09

## 2023-07-09 PROBLEM — E03.9 HYPOTHYROIDISM (ACQUIRED): Status: ACTIVE | Noted: 2023-07-09

## 2023-07-27 ENCOUNTER — READMISSION MANAGEMENT (OUTPATIENT)
Dept: CALL CENTER | Facility: HOSPITAL | Age: 88
End: 2023-07-27
Payer: MEDICARE

## 2023-07-27 NOTE — OUTREACH NOTE
Medical Week 3 Survey      Flowsheet Row Responses   [facility] patient discharged from? [place]   Does the patient have one of the following disease processes/diagnoses(primary or secondary)? Other   Week 3 attempt successful? No   Unsuccessful attempts Attempt 1            Grecia LOMBARDO - Licensed Nurse

## 2023-08-01 ENCOUNTER — READMISSION MANAGEMENT (OUTPATIENT)
Dept: CALL CENTER | Facility: HOSPITAL | Age: 88
End: 2023-08-01
Payer: MEDICARE

## 2024-10-15 ENCOUNTER — TRANSCRIBE ORDERS (OUTPATIENT)
Dept: HOME HEALTH SERVICES | Facility: HOME HEALTHCARE | Age: 89
End: 2024-10-15
Payer: MEDICARE

## 2024-10-15 DIAGNOSIS — L89.210 UNSTAGEABLE PRESSURE ULCER OF RIGHT HIP: Primary | ICD-10-CM

## 2024-10-16 ENCOUNTER — APPOINTMENT (OUTPATIENT)
Dept: GENERAL RADIOLOGY | Facility: HOSPITAL | Age: 89
End: 2024-10-16
Payer: MEDICARE

## 2024-10-16 ENCOUNTER — HOSPITAL ENCOUNTER (INPATIENT)
Facility: HOSPITAL | Age: 89
LOS: 7 days | Discharge: HOME HEALTH | End: 2024-10-23
Attending: INTERNAL MEDICINE | Admitting: INTERNAL MEDICINE
Payer: MEDICARE

## 2024-10-16 ENCOUNTER — HOME CARE VISIT (OUTPATIENT)
Dept: HOME HEALTH SERVICES | Facility: CLINIC | Age: 89
End: 2024-10-16
Payer: MEDICARE

## 2024-10-16 ENCOUNTER — APPOINTMENT (OUTPATIENT)
Dept: CT IMAGING | Facility: HOSPITAL | Age: 89
End: 2024-10-16
Payer: MEDICARE

## 2024-10-16 DIAGNOSIS — L89.210: ICD-10-CM

## 2024-10-16 DIAGNOSIS — L03.119 CELLULITIS OF HIP: ICD-10-CM

## 2024-10-16 DIAGNOSIS — N17.9 ACUTE RENAL FAILURE, UNSPECIFIED ACUTE RENAL FAILURE TYPE: Primary | ICD-10-CM

## 2024-10-16 DIAGNOSIS — T14.8XXA OPEN WOUND: ICD-10-CM

## 2024-10-16 DIAGNOSIS — R54 ADVANCED AGE: ICD-10-CM

## 2024-10-16 DIAGNOSIS — J12.3 PNEUMONIA DUE TO HUMAN METAPNEUMOVIRUS (HMPV): ICD-10-CM

## 2024-10-16 DIAGNOSIS — Z74.09 IMPAIRED MOBILITY: ICD-10-CM

## 2024-10-16 PROBLEM — L08.9 INFECTED WOUND: Status: ACTIVE | Noted: 2024-10-16

## 2024-10-16 PROBLEM — R79.89 ABNORMAL LFTS: Status: ACTIVE | Noted: 2024-10-16

## 2024-10-16 PROBLEM — R33.8 ACUTE URINARY RETENTION: Status: ACTIVE | Noted: 2024-10-16

## 2024-10-16 LAB
ALBUMIN SERPL-MCNC: 3.4 G/DL (ref 3.5–5.2)
ALBUMIN/GLOB SERPL: 0.9 G/DL
ALP SERPL-CCNC: 171 U/L (ref 39–117)
ALT SERPL W P-5'-P-CCNC: 565 U/L (ref 1–33)
ANION GAP SERPL CALCULATED.3IONS-SCNC: 12 MMOL/L (ref 5–15)
ANISOCYTOSIS BLD QL: ABNORMAL
AST SERPL-CCNC: 331 U/L (ref 1–32)
B PARAPERT DNA SPEC QL NAA+PROBE: NOT DETECTED
B PERT DNA SPEC QL NAA+PROBE: NOT DETECTED
BASOPHILS # BLD MANUAL: 0 10*3/MM3 (ref 0–0.2)
BASOPHILS NFR BLD MANUAL: 0 % (ref 0–1.5)
BILIRUB SERPL-MCNC: 0.6 MG/DL (ref 0–1.2)
BUN SERPL-MCNC: 55 MG/DL (ref 8–23)
BUN/CREAT SERPL: 27.4 (ref 7–25)
C PNEUM DNA NPH QL NAA+NON-PROBE: NOT DETECTED
CALCIUM SPEC-SCNC: 8.7 MG/DL (ref 8.6–10.5)
CHLORIDE SERPL-SCNC: 101 MMOL/L (ref 98–107)
CO2 SERPL-SCNC: 22 MMOL/L (ref 22–29)
CREAT SERPL-MCNC: 2.01 MG/DL (ref 0.57–1)
D-LACTATE SERPL-SCNC: 1.6 MMOL/L (ref 0.5–2)
DEPRECATED RDW RBC AUTO: 52.8 FL (ref 37–54)
EGFRCR SERPLBLD CKD-EPI 2021: 23.6 ML/MIN/1.73
ELLIPTOCYTES BLD QL SMEAR: ABNORMAL
EOSINOPHIL # BLD MANUAL: 0 10*3/MM3 (ref 0–0.4)
EOSINOPHIL NFR BLD MANUAL: 0 % (ref 0.3–6.2)
ERYTHROCYTE [DISTWIDTH] IN BLOOD BY AUTOMATED COUNT: 14 % (ref 12.3–15.4)
FERRITIN SERPL-MCNC: 2435 NG/ML (ref 13–150)
FLUAV SUBTYP SPEC NAA+PROBE: NOT DETECTED
FLUBV RNA ISLT QL NAA+PROBE: NOT DETECTED
GLOBULIN UR ELPH-MCNC: 3.8 GM/DL
GLUCOSE SERPL-MCNC: 119 MG/DL (ref 65–99)
HADV DNA SPEC NAA+PROBE: NOT DETECTED
HCOV 229E RNA SPEC QL NAA+PROBE: NOT DETECTED
HCOV HKU1 RNA SPEC QL NAA+PROBE: NOT DETECTED
HCOV NL63 RNA SPEC QL NAA+PROBE: NOT DETECTED
HCOV OC43 RNA SPEC QL NAA+PROBE: NOT DETECTED
HCT VFR BLD AUTO: 26.4 % (ref 34–46.6)
HGB BLD-MCNC: 8.6 G/DL (ref 12–15.9)
HMPV RNA NPH QL NAA+NON-PROBE: DETECTED
HPIV1 RNA ISLT QL NAA+PROBE: NOT DETECTED
HPIV2 RNA SPEC QL NAA+PROBE: NOT DETECTED
HPIV3 RNA NPH QL NAA+PROBE: NOT DETECTED
HPIV4 P GENE NPH QL NAA+PROBE: NOT DETECTED
LYMPHOCYTES # BLD MANUAL: 0.48 10*3/MM3 (ref 0.7–3.1)
LYMPHOCYTES NFR BLD MANUAL: 2 % (ref 5–12)
M PNEUMO IGG SER IA-ACNC: NOT DETECTED
MACROCYTES BLD QL SMEAR: ABNORMAL
MCH RBC QN AUTO: 33.7 PG (ref 26.6–33)
MCHC RBC AUTO-ENTMCNC: 32.6 G/DL (ref 31.5–35.7)
MCV RBC AUTO: 103.5 FL (ref 79–97)
MONOCYTES # BLD: 0.24 10*3/MM3 (ref 0.1–0.9)
NEUTROPHILS # BLD AUTO: 11.17 10*3/MM3 (ref 1.7–7)
NEUTROPHILS NFR BLD MANUAL: 91.9 % (ref 42.7–76)
NEUTS BAND NFR BLD MANUAL: 2 % (ref 0–5)
PLAT MORPH BLD: NORMAL
PLATELET # BLD AUTO: 198 10*3/MM3 (ref 140–450)
PMV BLD AUTO: 9.6 FL (ref 6–12)
POIKILOCYTOSIS BLD QL SMEAR: ABNORMAL
POTASSIUM SERPL-SCNC: 3.8 MMOL/L (ref 3.5–5.2)
PROT SERPL-MCNC: 7.2 G/DL (ref 6–8.5)
RBC # BLD AUTO: 2.55 10*6/MM3 (ref 3.77–5.28)
RHINOVIRUS RNA SPEC NAA+PROBE: NOT DETECTED
RSV RNA NPH QL NAA+NON-PROBE: NOT DETECTED
SARS-COV-2 RNA NPH QL NAA+NON-PROBE: NOT DETECTED
SODIUM SERPL-SCNC: 135 MMOL/L (ref 136–145)
TOXIC GRANULATION: ABNORMAL
VARIANT LYMPHS NFR BLD MANUAL: 1 % (ref 0–5)
VARIANT LYMPHS NFR BLD MANUAL: 3 % (ref 19.6–45.3)
WBC NRBC COR # BLD AUTO: 11.89 10*3/MM3 (ref 3.4–10.8)

## 2024-10-16 PROCEDURE — 83605 ASSAY OF LACTIC ACID: CPT | Performed by: INTERNAL MEDICINE

## 2024-10-16 PROCEDURE — 25810000003 SODIUM CHLORIDE 0.9 % SOLUTION: Performed by: INTERNAL MEDICINE

## 2024-10-16 PROCEDURE — 25810000003 LACTATED RINGERS SOLUTION: Performed by: INTERNAL MEDICINE

## 2024-10-16 PROCEDURE — 87205 SMEAR GRAM STAIN: CPT | Performed by: INTERNAL MEDICINE

## 2024-10-16 PROCEDURE — 87070 CULTURE OTHR SPECIMN AEROBIC: CPT | Performed by: INTERNAL MEDICINE

## 2024-10-16 PROCEDURE — 82728 ASSAY OF FERRITIN: CPT | Performed by: INTERNAL MEDICINE

## 2024-10-16 PROCEDURE — 25810000003 LACTATED RINGERS PER 1000 ML: Performed by: INTERNAL MEDICINE

## 2024-10-16 PROCEDURE — 87077 CULTURE AEROBIC IDENTIFY: CPT | Performed by: INTERNAL MEDICINE

## 2024-10-16 PROCEDURE — G0299 HHS/HOSPICE OF RN EA 15 MIN: HCPCS

## 2024-10-16 PROCEDURE — 87040 BLOOD CULTURE FOR BACTERIA: CPT | Performed by: INTERNAL MEDICINE

## 2024-10-16 PROCEDURE — 99222 1ST HOSP IP/OBS MODERATE 55: CPT | Performed by: GENERAL PRACTICE

## 2024-10-16 PROCEDURE — 25010000002 PIPERACILLIN SOD-TAZOBACTAM PER 1 G: Performed by: INTERNAL MEDICINE

## 2024-10-16 PROCEDURE — 0202U NFCT DS 22 TRGT SARS-COV-2: CPT | Performed by: INTERNAL MEDICINE

## 2024-10-16 PROCEDURE — 85007 BL SMEAR W/DIFF WBC COUNT: CPT | Performed by: INTERNAL MEDICINE

## 2024-10-16 PROCEDURE — 25010000002 VANCOMYCIN 10 G RECONSTITUTED SOLUTION: Performed by: INTERNAL MEDICINE

## 2024-10-16 PROCEDURE — 87186 SC STD MICRODIL/AGAR DIL: CPT | Performed by: INTERNAL MEDICINE

## 2024-10-16 PROCEDURE — 36415 COLL VENOUS BLD VENIPUNCTURE: CPT

## 2024-10-16 PROCEDURE — 85025 COMPLETE CBC W/AUTO DIFF WBC: CPT | Performed by: INTERNAL MEDICINE

## 2024-10-16 PROCEDURE — 80053 COMPREHEN METABOLIC PANEL: CPT | Performed by: INTERNAL MEDICINE

## 2024-10-16 PROCEDURE — 74176 CT ABD & PELVIS W/O CONTRAST: CPT

## 2024-10-16 PROCEDURE — 71045 X-RAY EXAM CHEST 1 VIEW: CPT

## 2024-10-16 PROCEDURE — 99285 EMERGENCY DEPT VISIT HI MDM: CPT

## 2024-10-16 RX ORDER — SODIUM CHLORIDE 0.9 % (FLUSH) 0.9 %
10 SYRINGE (ML) INJECTION EVERY 12 HOURS SCHEDULED
Status: DISCONTINUED | OUTPATIENT
Start: 2024-10-16 | End: 2024-10-23 | Stop reason: HOSPADM

## 2024-10-16 RX ORDER — VANCOMYCIN 750 MG/150ML
15 INJECTION, SOLUTION INTRAVENOUS ONCE
Status: DISCONTINUED | OUTPATIENT
Start: 2024-10-16 | End: 2024-10-16 | Stop reason: SDUPTHER

## 2024-10-16 RX ORDER — ACETAMINOPHEN 325 MG/1
650 TABLET ORAL EVERY 6 HOURS PRN
Status: DISCONTINUED | OUTPATIENT
Start: 2024-10-16 | End: 2024-10-23 | Stop reason: HOSPADM

## 2024-10-16 RX ORDER — MULTIPLE VITAMINS W/ MINERALS TAB 9MG-400MCG
1 TAB ORAL DAILY
Status: DISCONTINUED | OUTPATIENT
Start: 2024-10-16 | End: 2024-10-23 | Stop reason: HOSPADM

## 2024-10-16 RX ORDER — VANCOMYCIN 500 MG/100ML
500 INJECTION, SOLUTION INTRAVENOUS EVERY 24 HOURS
Status: DISCONTINUED | OUTPATIENT
Start: 2024-10-17 | End: 2024-10-18

## 2024-10-16 RX ORDER — AMOXICILLIN 250 MG
2 CAPSULE ORAL 2 TIMES DAILY
Status: DISCONTINUED | OUTPATIENT
Start: 2024-10-16 | End: 2024-10-17

## 2024-10-16 RX ORDER — BISACODYL 5 MG/1
5 TABLET, DELAYED RELEASE ORAL DAILY
Status: DISCONTINUED | OUTPATIENT
Start: 2024-10-16 | End: 2024-10-17

## 2024-10-16 RX ORDER — SODIUM CHLORIDE 9 MG/ML
40 INJECTION, SOLUTION INTRAVENOUS AS NEEDED
Status: DISPENSED | OUTPATIENT
Start: 2024-10-16 | End: 2024-10-17

## 2024-10-16 RX ORDER — POLYETHYLENE GLYCOL 3350 17 G/17G
17 POWDER, FOR SOLUTION ORAL DAILY
Status: DISCONTINUED | OUTPATIENT
Start: 2024-10-16 | End: 2024-10-17

## 2024-10-16 RX ORDER — BISACODYL 10 MG
10 SUPPOSITORY, RECTAL RECTAL DAILY PRN
Status: DISCONTINUED | OUTPATIENT
Start: 2024-10-16 | End: 2024-10-17

## 2024-10-16 RX ORDER — CITALOPRAM HYDROBROMIDE 20 MG/1
20 TABLET ORAL DAILY
Status: DISCONTINUED | OUTPATIENT
Start: 2024-10-17 | End: 2024-10-23 | Stop reason: HOSPADM

## 2024-10-16 RX ORDER — CLOPIDOGREL BISULFATE 75 MG/1
75 TABLET ORAL DAILY
Status: DISCONTINUED | OUTPATIENT
Start: 2024-10-16 | End: 2024-10-23 | Stop reason: HOSPADM

## 2024-10-16 RX ORDER — ONDANSETRON 2 MG/ML
4 INJECTION INTRAMUSCULAR; INTRAVENOUS EVERY 6 HOURS PRN
Status: DISCONTINUED | OUTPATIENT
Start: 2024-10-16 | End: 2024-10-23 | Stop reason: HOSPADM

## 2024-10-16 RX ORDER — SODIUM CHLORIDE, SODIUM LACTATE, POTASSIUM CHLORIDE, CALCIUM CHLORIDE 600; 310; 30; 20 MG/100ML; MG/100ML; MG/100ML; MG/100ML
125 INJECTION, SOLUTION INTRAVENOUS CONTINUOUS
Status: DISPENSED | OUTPATIENT
Start: 2024-10-16 | End: 2024-10-17

## 2024-10-16 RX ORDER — ALUMINA, MAGNESIA, AND SIMETHICONE 2400; 2400; 240 MG/30ML; MG/30ML; MG/30ML
15 SUSPENSION ORAL EVERY 6 HOURS PRN
Status: DISCONTINUED | OUTPATIENT
Start: 2024-10-16 | End: 2024-10-23 | Stop reason: HOSPADM

## 2024-10-16 RX ORDER — SODIUM CHLORIDE 0.9 % (FLUSH) 0.9 %
10 SYRINGE (ML) INJECTION AS NEEDED
Status: DISCONTINUED | OUTPATIENT
Start: 2024-10-16 | End: 2024-10-23 | Stop reason: HOSPADM

## 2024-10-16 RX ORDER — LEVOTHYROXINE SODIUM 75 UG/1
75 TABLET ORAL
Status: DISCONTINUED | OUTPATIENT
Start: 2024-10-17 | End: 2024-10-23

## 2024-10-16 RX ADMIN — SODIUM CHLORIDE, POTASSIUM CHLORIDE, SODIUM LACTATE AND CALCIUM CHLORIDE 500 ML: 600; 310; 30; 20 INJECTION, SOLUTION INTRAVENOUS at 21:33

## 2024-10-16 RX ADMIN — PIPERACILLIN AND TAZOBACTAM 3.38 G: 3; .375 INJECTION, POWDER, FOR SOLUTION INTRAVENOUS at 17:19

## 2024-10-16 RX ADMIN — SODIUM CHLORIDE, POTASSIUM CHLORIDE, SODIUM LACTATE AND CALCIUM CHLORIDE 500 ML: 600; 310; 30; 20 INJECTION, SOLUTION INTRAVENOUS at 18:57

## 2024-10-16 RX ADMIN — DOCUSATE SODIUM 50 MG AND SENNOSIDES 8.6 MG 2 TABLET: 8.6; 5 TABLET, FILM COATED ORAL at 21:33

## 2024-10-16 RX ADMIN — SODIUM CHLORIDE 750 MG: 0.9 INJECTION, SOLUTION INTRAVENOUS at 21:32

## 2024-10-16 RX ADMIN — SODIUM CHLORIDE, POTASSIUM CHLORIDE, SODIUM LACTATE AND CALCIUM CHLORIDE 125 ML/HR: 600; 310; 30; 20 INJECTION, SOLUTION INTRAVENOUS at 21:33

## 2024-10-16 RX ADMIN — POLYETHYLENE GLYCOL 3350 17 G: 17 POWDER, FOR SOLUTION ORAL at 21:33

## 2024-10-16 RX ADMIN — Medication 10 ML: at 21:33

## 2024-10-16 NOTE — ED NOTES
"Nursing report ED to floor  Sofía Clifton  89 y.o.  female    HPI:   Chief Complaint   Patient presents with    Wound Infection       Admitting doctor:   Gilbert Palmer MD    Consulting provider(s):  Consults       No orders found from 9/17/2024 to 10/17/2024.             Admitting diagnosis:   The primary encounter diagnosis was Acute renal failure, unspecified acute renal failure type. Diagnoses of Cellulitis of hip and Pneumonia due to human metapneumovirus (hMPV) were also pertinent to this visit.    Code status:   Current Code Status       Date Active Code Status Order ID Comments User Context       10/16/2024 1808 CPR (Attempt to Resuscitate) [id number]  Gilbert Palmer MD ED        Question Answer    Code Status (Patient has no pulse and is not breathing) CPR (Attempt to Resuscitate)    Medical Interventions (Patient has pulse or is breathing) Full Support                    Allergies:   Codeine, Morphine and codeine, Norflex [orphenadrine citrate], and Stadol [butorphanol]    Intake and Output    Intake/Output Summary (Last 24 hours) at 10/16/2024 1850  Last data filed at 10/16/2024 1823  Gross per 24 hour   Intake 100 ml   Output --   Net 100 ml       Weight:       10/16/24  1354   Weight: 45.4 kg (100 lb)       Most recent vitals:   Vitals:    10/16/24 1354 10/16/24 1847   BP: 95/48 110/52   BP Location: Left arm    Patient Position: Sitting    Pulse: 64 64   Resp: 18 18   Temp: 98 °F (36.7 °C)    TempSrc: Oral    SpO2: 96% 98%   Weight: 45.4 kg (100 lb)    Height: 160 cm (63\")      Oxygen Therapy: .    Active LDAs/IV Access:   Lines, Drains & Airways       Active LDAs       Name Placement date Placement time Site Days    Peripheral IV 10/16/24 1613 Right Antecubital 10/16/24  1613  Antecubital  less than 1                    Labs (abnormal labs have a star):   Labs Reviewed   RESPIRATORY PANEL PCR W/ COVID-19 (SARS-COV-2), NP SWAB IN UTM/VTP, 2 HR TAT - Abnormal; Notable for the following " components:       Result Value    Human Metapneumovirus Detected (*)     All other components within normal limits    Narrative:     In the setting of a positive respiratory panel with a viral infection PLUS a negative procalcitonin without other underlying concern for bacterial infection, consider observing off antibiotics or discontinuation of antibiotics and continue supportive care. If the respiratory panel is positive for atypical bacterial infection (Bordetella pertussis, Chlamydophila pneumoniae, or Mycoplasma pneumoniae), consider antibiotic de-escalation to target atypical bacterial infection.   COMPREHENSIVE METABOLIC PANEL - Abnormal; Notable for the following components:    Glucose 119 (*)     BUN 55 (*)     Creatinine 2.01 (*)     Sodium 135 (*)     Albumin 3.4 (*)     ALT (SGPT) 565 (*)     AST (SGOT) 331 (*)     Alkaline Phosphatase 171 (*)     BUN/Creatinine Ratio 27.4 (*)     eGFR 23.6 (*)     All other components within normal limits    Narrative:     GFR Normal >60  Chronic Kidney Disease <60  Kidney Failure <15    The GFR formula is only valid for adults with stable renal function between ages 18 and 70.   CBC WITH AUTO DIFFERENTIAL - Abnormal; Notable for the following components:    WBC 11.89 (*)     RBC 2.55 (*)     Hemoglobin 8.6 (*)     Hematocrit 26.4 (*)     .5 (*)     MCH 33.7 (*)     All other components within normal limits    Narrative:     The previously reported component NRBC is no longer being reported. Previous result was 0.0 /100 WBC (Reference Range: 0.0-0.2 /100 WBC) on 10/16/2024 at 1619 CDT.   MANUAL DIFFERENTIAL - Abnormal; Notable for the following components:    Neutrophil % 91.9 (*)     Lymphocyte % 3.0 (*)     Monocyte % 2.0 (*)     Eosinophil % 0.0 (*)     Neutrophils Absolute 11.17 (*)     Lymphocytes Absolute 0.48 (*)     All other components within normal limits   FERRITIN - Abnormal; Notable for the following components:    Ferritin 2,435.00 (*)     All other  components within normal limits    Narrative:     Results may be falsely decreased if patient taking Biotin.     LACTIC ACID, PLASMA - Normal   WOUND CULTURE   BLOOD CULTURE   BLOOD CULTURE   URINALYSIS W/ CULTURE IF INDICATED   CBC AND DIFFERENTIAL    Narrative:     The following orders were created for panel order CBC & Differential.  Procedure                               Abnormality         Status                     ---------                               -----------         ------                     CBC Auto Differential[[id number]]        Abnormal            Final result                 Please view results for these tests on the individual orders.       Meds given in ED:   Medications   lactated ringers bolus 500 mL (has no administration in time range)   piperacillin-tazobactam (ZOSYN) 3.375 g IVPB in 100 mL NS MBP (CD) (0 g Intravenous Stopped 10/16/24 3638)           NIH Stroke Scale:       Isolation/Infection(s):  No active isolations   Human Metapneumovirus      COVID Testing  Collected   Resulted Negative     Nursing report ED to floor:  Mental status: A & O x 2 (person and place)   Ambulatory status: With assistance   Precautions: Fall and Contact isolation     ED nurse phone extentsion- ..

## 2024-10-16 NOTE — ED PROVIDER NOTES
"Subjective   History of Present Illness  89-year-old female who presents emergency department for a wound infection.  The patient has dementia.  She normally lives with her daughter.  Her daughter has recently gone out of town for surgical procedure with her .  The  patient is accompanied by her son and daughter-in-law.  They note that they have had some difficulty getting home health to come out.  Home health from [facility] came out today and noted that they thought the patient needed to come to the ED.  The patient went to the PCP and stated that she had a sore on her hip.  It has been present off and on for couple months.  The patient will pick on it make it worse.  She was given Omnicef, but also noted to have a urinary tract infection.  She recently finished the Omnicef.  On the eighth she went to the PCP, and they noted that he did not do anything initially, and the second visit on follow-up he did prescribe the antibiotic at that time.  And then have had some difficulty getting home health.  The patient has no fevers or chills, but is unable to give any type of history.  They also note that she has developed cold-like symptoms with a runny nose.  On my exam she does have a congested cough.    Review of Systems   Constitutional:  Negative for chills and fever.   Skin:  Positive for wound.       Past Medical History:   Diagnosis Date    Anemia 11/18/2016    Benign essential hypertension 11/18/2016    Chronic constipation 11/18/2016    Degeneration of lumbar intervertebral disc 11/18/2016    Diverticulitis of colon 11/18/2016    Gastroesophageal reflux disease with esophagitis 11/18/2016    Hypercholesteremia 11/18/2016    Osteoarthritis 11/18/2016    Paroxysmal atrial fibrillation 11/18/2016       Allergies   Allergen Reactions    Codeine Hives    Morphine And Codeine      \"Burning inside\" per patient    Norflex [Orphenadrine Citrate] Hives    Stadol [Butorphanol] Mental Status Change and Irritability "       Past Surgical History:   Procedure Laterality Date    APPENDECTOMY      BACK SURGERY      C-SPINE, LUMBAR SPINE    BLADDER SURGERY      CHOLECYSTECTOMY      ENDOSCOPY N/A 07/12/2023    Distal Esophagitis LA Grade A otherwise normal exam    ENDOSCOPY  09/29/2015    Non-bleeding erosive gastropathy    HYSTERECTOMY      JOINT REPLACEMENT Right     HIP    KNEE SURGERY Right     TOTAL HIP ARTHROPLASTY Left        Family History   Problem Relation Age of Onset    Heart disease Father     Heart attack Father     Heart disease Sister     Heart attack Sister     Heart disease Mother     Alzheimer's disease Mother     Heart disease Brother     No Known Problems Maternal Grandmother     No Known Problems Maternal Grandfather     No Known Problems Paternal Grandmother     No Known Problems Paternal Grandfather     Heart disease Sister     Heart disease Brother     Heart disease Brother     Heart disease Brother     Heart disease Brother     No Known Problems Brother     Heart disease Brother     Heart disease Brother        Social History     Socioeconomic History    Marital status:    Tobacco Use    Smoking status: Never    Smokeless tobacco: Never   Vaping Use    Vaping status: Never Used   Substance and Sexual Activity    Alcohol use: No    Drug use: No    Sexual activity: Defer           Objective   Physical Exam  Vitals reviewed.   Constitutional:       Appearance: She is not ill-appearing.   HENT:      Head: Normocephalic and atraumatic.      Right Ear: External ear normal.      Left Ear: External ear normal.      Nose: Nose normal.   Eyes:      General: No scleral icterus.     Conjunctiva/sclera: Conjunctivae normal.   Cardiovascular:      Rate and Rhythm: Normal rate and regular rhythm.      Heart sounds: Normal heart sounds.   Pulmonary:      Effort: Pulmonary effort is normal.      Breath sounds: Normal breath sounds.   Abdominal:      General: There is no distension.      Palpations: Abdomen is soft.    Musculoskeletal:         General: No swelling or tenderness.      Cervical back: Normal range of motion and neck supple.   Skin:     General: Skin is warm and dry.      Comments: Right hip wound with discharge. Erythema. Appears to have a deep hole in the right hip.    Neurological:      Mental Status: She is alert.      Cranial Nerves: No cranial nerve deficit.      Comments: Confused    Psychiatric:         Mood and Affect: Mood normal.         Behavior: Behavior normal.      Comments: Confused. Appears to have chronic dementia.          Procedures       Lab Results (last 24 hours)       Procedure Component Value Units Date/Time    CBC & Differential [[id number]]  (Abnormal) Collected: 10/16/24 1549    Specimen: Blood Updated: 10/16/24 1642    Narrative:      The following orders were created for panel order CBC & Differential.  Procedure                               Abnormality         Status                     ---------                               -----------         ------                     CBC Auto Differential[[id number]]        Abnormal            Final result                 Please view results for these tests on the individual orders.    Comprehensive Metabolic Panel [[id number]]  (Abnormal) Collected: 10/16/24 1549    Specimen: Blood Updated: 10/16/24 1633     Glucose 119 mg/dL      BUN 55 mg/dL      Creatinine 2.01 mg/dL      Sodium 135 mmol/L      Potassium 3.8 mmol/L      Chloride 101 mmol/L      CO2 22.0 mmol/L      Calcium 8.7 mg/dL      Total Protein 7.2 g/dL      Albumin 3.4 g/dL      ALT (SGPT) 565 U/L      AST (SGOT) 331 U/L      Alkaline Phosphatase 171 U/L      Total Bilirubin 0.6 mg/dL      Globulin 3.8 gm/dL      A/G Ratio 0.9 g/dL      BUN/Creatinine Ratio 27.4     Anion Gap 12.0 mmol/L      eGFR 23.6 mL/min/1.73     Narrative:      GFR Normal >60  Chronic Kidney Disease <60  Kidney Failure <15    The GFR formula is only valid for adults with stable renal function between ages 18 and  70.    Lactic Acid, Plasma [[id number]]  (Normal) Collected: 10/16/24 1549    Specimen: Blood Updated: 10/16/24 1633     Lactate 1.6 mmol/L     Blood Culture - Blood, Arm, Right [[id number]] Collected: 10/16/24 1549    Specimen: Blood from Arm, Right Updated: 10/16/24 1609    CBC Auto Differential [[id number]]  (Abnormal) Collected: 10/16/24 1549    Specimen: Blood Updated: 10/16/24 1642     WBC 11.89 10*3/mm3      RBC 2.55 10*6/mm3      Hemoglobin 8.6 g/dL      Hematocrit 26.4 %      .5 fL      MCH 33.7 pg      MCHC 32.6 g/dL      RDW 14.0 %      RDW-SD 52.8 fl      MPV 9.6 fL      Platelets 198 10*3/mm3     Narrative:      The previously reported component NRBC is no longer being reported. Previous result was 0.0 /100 WBC (Reference Range: 0.0-0.2 /100 WBC) on 10/16/2024 at 1619 CDT.    Manual Differential [[id number]]  (Abnormal) Collected: 10/16/24 1549    Specimen: Blood Updated: 10/16/24 1642     Neutrophil % 91.9 %      Lymphocyte % 3.0 %      Monocyte % 2.0 %      Eosinophil % 0.0 %      Basophil % 0.0 %      Bands %  2.0 %      Atypical Lymphocyte % 1.0 %      Neutrophils Absolute 11.17 10*3/mm3      Lymphocytes Absolute 0.48 10*3/mm3      Monocytes Absolute 0.24 10*3/mm3      Eosinophils Absolute 0.00 10*3/mm3      Basophils Absolute 0.00 10*3/mm3      Anisocytosis Slight/1+     Elliptocytes Slight/1+     Macrocytes Slight/1+     Poikilocytes Slight/1+     Toxic Granulation Slight/1+     Platelet Morphology Normal    Ferritin [[id number]]  (Abnormal) Collected: 10/16/24 1549    Specimen: Blood Updated: 10/16/24 1735     Ferritin 2,435.00 ng/mL     Narrative:      Results may be falsely decreased if patient taking Biotin.      Respiratory Panel PCR w/COVID-19(SARS-CoV-2) [facility] In-House, NP Swab in UTM/VTM, 2 HR TAT - Swab, Nasopharynx [[id number]]  (Abnormal) Collected: 10/16/24 1550    Specimen: Swab from Nasopharynx Updated: 10/16/24 2964     ADENOVIRUS, PCR Not Detected     Coronavirus  229E Not Detected     Coronavirus HKU1 Not Detected     Coronavirus NL63 Not Detected     Coronavirus OC43 Not Detected     COVID19 Not Detected     Human Metapneumovirus Detected     Human Rhinovirus/Enterovirus Not Detected     Influenza A PCR Not Detected     Influenza B PCR Not Detected     Parainfluenza Virus 1 Not Detected     Parainfluenza Virus 2 Not Detected     Parainfluenza Virus 3 Not Detected     Parainfluenza Virus 4 Not Detected     RSV, PCR Not Detected     Bordetella pertussis pcr Not Detected     Bordetella parapertussis PCR Not Detected     Chlamydophila pneumoniae PCR Not Detected     Mycoplasma pneumo by PCR Not Detected    Narrative:      In the setting of a positive respiratory panel with a viral infection PLUS a negative procalcitonin without other underlying concern for bacterial infection, consider observing off antibiotics or discontinuation of antibiotics and continue supportive care. If the respiratory panel is positive for atypical bacterial infection (Bordetella pertussis, Chlamydophila pneumoniae, or Mycoplasma pneumoniae), consider antibiotic de-escalation to target atypical bacterial infection.    Wound Culture - Wound, Hip, Right [[id number]] Collected: 10/16/24 1555    Specimen: Wound from Hip, Right Updated: 10/16/24 1606    Blood Culture - Blood, Arm, Left [[id number]] Collected: 10/16/24 1612    Specimen: Blood from Arm, Left Updated: 10/16/24 1649          CT Abdomen Pelvis Without Contrast   Final Result   1. Skin ulceration in the right pelvic region lateral to the ileum with   a 5.6 x 3.1 cm collection that is not simple fluid. This is likely an   inflammatory mass/phlegmon. There is some air in the soft tissues in   this area. This abuts the lateral margin of the right ilium. There is no   right ilium erosion or destruction to suggest osteomyelitis.   2. Atelectasis in the lung bases. Patchy lingular consolidation may   represent atelectasis or pneumonia. Coronary artery  calcification.   Aortic and mitral valve calcification.   3. The liver is very dense. Hemachromatosis is considered. Increased   liver density may also be seen with chronic amiodarone therapy.   4. A 3.5 cm left renal cyst.   5. Mild distention of the urinary bladder. There is air in the urinary   bladder. Correlate with instrumentation.   6. Gastric wall thickening likely due to under distention. Gastritis and   other etiologies are also considered.   7. Diverticulosis of the colon. Fecal impaction of the rectum measuring   8.9 cm transversely.   8. Other nonacute findings, as discussed above.           The full report of this exam was immediately signed and available to the   emergency room. The patient is currently in the emergency room.       This report was signed and finalized on 10/16/2024 5:20 PM by Dr. Yunior Chanel MD.          XR Chest 1 View   Final Result       1. Bilateral opacities in the lungs may be due to edema or multifocal   pneumonia.       This report was signed and finalized on 10/16/2024 3:09 PM by Jose Hooper.                ED Course  ED Course as of 10/16/24 1747   Wed Oct 16, 2024   1601 Anticipate admission based on CXR. Spoke with family RE possible plan and update.  [AJ]   1743 General surgery is in the ED evaluating the patient. Dr. Palmer is agreeable to admission.  [AJ]      ED Course User Index  [AJ] Jer Chapman, DO      Wound culture. Zosyn.                                        Medical Decision Making  DDX: COVID, PNA, Cellulitis, abscess    Amount and/or Complexity of Data Reviewed  Labs: ordered.     Details: Cbc cmp lactic   Radiology: ordered.        Final diagnoses:   Acute renal failure, unspecified acute renal failure type   Cellulitis of hip   Pneumonia due to human metapneumovirus (hMPV)       ED Disposition  ED Disposition       ED Disposition   Decision to Admit    Condition   --    Comment   Level of Care: Med/Surg [1]   Diagnosis: ARF (acute renal  failure) [533092]   Admitting Physician: AURORA AGUIRRE [[id number]]   Certification: I Certify That Inpatient Hospital Services Are Medically Necessary For Greater Than 2 Midnights                 No follow-up provider specified.       Medication List        New Prescriptions      CeleXA 20 MG tablet  Generic drug: citalopram     ciprofloxacin 500 MG tablet  Commonly known as: CIPRO     furosemide 40 MG tablet  Commonly known as: LASIX     hydrALAZINE 25 MG tablet  Commonly known as: APRESOLINE                 Jer Chapman DO  10/16/24 1523       Jer Chapman DO  10/16/24 1744

## 2024-10-16 NOTE — CONSULTS
Patient Care Team:  Sukhi Pelayo MD as PCP - General  Sukhi Pelayo MD as PCP - Family Medicine  Sukhi Pelayo MD as Referring Physician (Internal Medicine)  Xavier Hough MD as Cardiologist (Cardiology)    Chief complaint wound infection    Subjective     Subjective .     History of present illness: 89-year-old female with past medical history of dementia, hypertension, constipation who presents today due to concern of right hip wound.    Patient is cared for at home by home health who noticed wound on lateral aspect of her hip getting worse the past few weeks.  The home health provider recommended she present to the emergency department.  Patient recently was treated for UTI with 10-day course of Omnicef.    She has had decreased p.o. intake, increased fatigue and weakness, increased confusion from her baseline.    Review of Systems  Pertinent items are noted in HPI, all other systems reviewed and negative    History  Past Medical History:   Diagnosis Date    Anemia 11/18/2016    Benign essential hypertension 11/18/2016    Chronic constipation 11/18/2016    Degeneration of lumbar intervertebral disc 11/18/2016    Diverticulitis of colon 11/18/2016    Gastroesophageal reflux disease with esophagitis 11/18/2016    Hypercholesteremia 11/18/2016    Osteoarthritis 11/18/2016    Paroxysmal atrial fibrillation 11/18/2016   ,   Past Surgical History:   Procedure Laterality Date    APPENDECTOMY      BACK SURGERY      C-SPINE, LUMBAR SPINE    BLADDER SURGERY      CHOLECYSTECTOMY      ENDOSCOPY N/A 07/12/2023    Distal Esophagitis LA Grade A otherwise normal exam    ENDOSCOPY  09/29/2015    Non-bleeding erosive gastropathy    HYSTERECTOMY      JOINT REPLACEMENT Right     HIP    KNEE SURGERY Right     TOTAL HIP ARTHROPLASTY Left    ,   Family History   Problem Relation Age of Onset    Heart disease Father     Heart attack Father     Heart disease Sister     Heart attack Sister     Heart disease  Mother     Alzheimer's disease Mother     Heart disease Brother     No Known Problems Maternal Grandmother     No Known Problems Maternal Grandfather     No Known Problems Paternal Grandmother     No Known Problems Paternal Grandfather     Heart disease Sister     Heart disease Brother     Heart disease Brother     Heart disease Brother     Heart disease Brother     No Known Problems Brother     Heart disease Brother     Heart disease Brother    ,   Social History     Tobacco Use    Smoking status: Never    Smokeless tobacco: Never   Vaping Use    Vaping status: Never Used   Substance Use Topics    Alcohol use: No    Drug use: No   , (Not in a hospital admission)  , Scheduled Meds:  lactated ringers, 500 mL, Intravenous, Once    , Continuous Infusions:   , PRN Meds:   and Allergies:  Codeine, Morphine and codeine, Norflex [orphenadrine citrate], and Stadol [butorphanol]    Objective      Objective     Vital Signs   Temp:  [98 °F (36.7 °C)] 98 °F (36.7 °C)  Heart Rate:  [64] 64  Resp:  [18] 18  BP: ()/(48-52) 110/52    Intake & Output (last 3 days)         10/13 0701  10/14 0700 10/14 0701  10/15 0700 10/15 0701  10/16 0700 10/16 0701  10/17 0700    IV Piggyback    100    Total Intake(mL/kg)    100 (2.2)    Net    +100                     Physical Exam:     General Appearance:  Not oriented on exam, cooperative, in no acute distress   Head:    Normocephalic, without obvious abnormality, atraumatic   Lungs:   No increased work of breathing, bilateral breath sounds    Heart:  Regular rate and rhythm   Chest Wall:    No abnormalities observed   Abdomen:   Soft, nontender, nondistended   Rectal:     Deferred   Extremities: Right hip with 3 cm x 4 cm pressure ulcer.  Wound extends deep towards the iliac crest.  Fibrinous and purulent fluid expressed.   Pulses:   Pulses palpable and equal bilaterally        Results from last 7 days   Lab Units 10/16/24  1549   WBC 10*3/mm3 11.89*   HEMOGLOBIN g/dL 8.6*   HEMATOCRIT %  26.4*   PLATELETS 10*3/mm3 198        Results from last 7 days   Lab Units 10/16/24  1549   SODIUM mmol/L 135*   POTASSIUM mmol/L 3.8   CHLORIDE mmol/L 101   CO2 mmol/L 22.0   BUN mg/dL 55*   CREATININE mg/dL 2.01*   CALCIUM mg/dL 8.7   BILIRUBIN mg/dL 0.6   ALK PHOS U/L 171*   ALT (SGPT) U/L 565*   AST (SGOT) U/L 331*   GLUCOSE mg/dL 119*         Results Review:   I reviewed the patient's new clinical results.    CT abdomen pelvis with right iliac crest pressure ulcer extending down to bone.  No evidence of osteomyelitis appreciated.    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    Right iliac crest wound bedside debrided.  No undrained fluid collection appreciated and there is minimal surrounding erythema.  I do not think this is because for her systemic symptoms however patient would benefit from formal debridement in the OR and placement of wound VAC.    Additionally patient has significant stool burden with fecal impaction.  Will plan to accomplish fecal disimpaction in the OR as well.    Plan  *OR tomorrow for wound debridement, wound VAC placement, and fecal disimpaction  *N.p.o. at midnight  *Scheduled bowel regimen for constipation at discharge  *Remainder of care per primary team      I discussed the patient's findings and my recommendations with patient, primary care team, and consulting provider    Wade Alegria MD  10/16/24  18:58 CDT    Time: Time spent with patient 35 minutes     Part of this note may be an electronic transcription/translation of spoken language to printed text using the Dragon  Dictation System.

## 2024-10-16 NOTE — H&P
[facility] Medicine Services  HISTORY AND PHYSICAL    Date of Admission: 10/16/2024  Primary Care Physician: Sukhi Pelayo MD    Subjective   Primary Historian: Patient's family    Chief Complaint: Right hip wound    History of Present Illness  Patient is an 89-year-old  female with past medical history significant for dementia, hypertension, chronic constipation, that presented to our facility today after being evaluated by home health, and workup being concerning for a worsening wound near her right hip.  Patient is not an ideal historian given her history of dementia, however family is at bedside to provide additional history.  It sounds like she has had a wound on the lateral aspect of her right hip that has been slowly getting worse for the past couple of weeks.  They indicate that she has been on antibiotics in the outpatient setting for a bladder infection recently, and recently finished up a 10-day course of Omnicef.  Over the past couple of days she has had worsening p.o. intake.  She has been more weak and fatigued, and even a little bit more confused as compared to her baseline.  The wound involving her right hip is progressively gotten worse with malodorous drainage.  It is unclear if patient has had any subjective fevers or chills leading up to this hospitalization.  Family at bedside reports that she does urinate frequently.  They also report that she is prone towards constipation, and typically tries to avoid having a bowel movement given a history of hemorrhoids.  Patient lives at home with one of her daughters.  Family indicates that she no longer walks, and primarily her mobility is transferring to a wheelchair.  Family did indicate that she spends a lot of time resting/lying on her right hip as that is her preferred position.    Review of Systems   Otherwise complete ROS reviewed and negative except as mentioned in the HPI.    Past Medical  "History:   Past Medical History:   Diagnosis Date    Anemia 11/18/2016    Benign essential hypertension 11/18/2016    Chronic constipation 11/18/2016    Degeneration of lumbar intervertebral disc 11/18/2016    Diverticulitis of colon 11/18/2016    Gastroesophageal reflux disease with esophagitis 11/18/2016    Hypercholesteremia 11/18/2016    Osteoarthritis 11/18/2016    Paroxysmal atrial fibrillation 11/18/2016     Past Surgical History:  Past Surgical History:   Procedure Laterality Date    APPENDECTOMY      BACK SURGERY      C-SPINE, LUMBAR SPINE    BLADDER SURGERY      CHOLECYSTECTOMY      ENDOSCOPY N/A 07/12/2023    Distal Esophagitis LA Grade A otherwise normal exam    ENDOSCOPY  09/29/2015    Non-bleeding erosive gastropathy    HYSTERECTOMY      JOINT REPLACEMENT Right     HIP    KNEE SURGERY Right     TOTAL HIP ARTHROPLASTY Left      Social History:  reports that she has never smoked. She has never used smokeless tobacco. She reports that she does not drink alcohol and does not use drugs.    Family History: family history includes Alzheimer's disease in her mother; Heart attack in her father and sister; Heart disease in her brother, brother, brother, brother, brother, brother, brother, father, mother, sister, and sister; No Known Problems in her brother, maternal grandfather, maternal grandmother, paternal grandfather, and paternal grandmother.       Allergies:  Allergies   Allergen Reactions    Codeine Hives    Morphine And Codeine      \"Burning inside\" per patient    Norflex [Orphenadrine Citrate] Hives    Stadol [Butorphanol] Mental Status Change and Irritability       Medications:  Prior to Admission medications    Medication Sig Start Date End Date Taking? Authorizing Provider   amiodarone (PACERONE) 200 MG tablet Take 1 tablet by mouth 2 (Two) Times a Day. Indications: Atrial Fibrillation 7/9/23   Provider, MD Bre   amLODIPine (NORVASC) 2.5 MG tablet Take 1 tablet by mouth Daily. 7/12/23   " Xavier Collins DO   Calcium Carb-Cholecalciferol (CALCIUM 600/VITAMIN D3 PO) Take 1 tablet by mouth Daily.    Bre Caal MD   cetirizine (zyrTEC) 10 MG tablet Take 1 tablet by mouth Daily.    Bre Caal MD   ciprofloxacin (CIPRO) 500 MG tablet Take 500 mg by mouth 2 (Two) Times a Day. Indications: Bacteria in the Blood 10/16/24   Bre Caal MD   citalopram (CeleXA) 20 MG tablet Take 20 mg by mouth Daily. Indications: Major Depressive Disorder 10/16/24   Bre Caal MD   clopidogrel (PLAVIX) 75 MG tablet Take 1 tablet by mouth Daily. 7/15/23   Xavier Collins DO   docusate sodium (COLACE) 50 MG capsule Take  by mouth 2 (Two) Times a Day.    Bre Caal MD   furosemide (LASIX) 40 MG tablet Take 40 mg by mouth Daily. Indications: Cardiac Failure 10/16/24   Bre Caal MD   hydrALAZINE (APRESOLINE) 25 MG tablet Take 25 mg by mouth Daily. Indications: High Blood Pressure 10/16/24   Bre Caal MD   HYDROcodone-acetaminophen (NORCO)  MG per tablet Take 1 tablet by mouth Every 8 (Eight) Hours As Needed for Moderate Pain.    Bre Caal MD   isosorbide mononitrate (IMDUR) 30 MG 24 hr tablet Take 1 tablet by mouth Daily.    Bre Caal MD   levothyroxine (SYNTHROID, LEVOTHROID) 75 MCG tablet Take 1 tablet by mouth Every Morning.    Bre Caal MD   metoprolol tartrate (LOPRESSOR) 25 MG tablet Take 1 tablet by mouth 2 (Two) Times a Day.    Bre Caal MD   multivitamin with minerals tablet tablet Take 1 tablet by mouth Daily.    Bre Caal MD   pantoprazole (PROTONIX) 40 MG EC tablet Take 1 tablet by mouth 2 (Two) Times a Day Before Meals. 7/12/23   Xavier Collins DO   pravastatin (PRAVACHOL) 40 MG tablet Take 1 tablet by mouth Daily.    Bre Caal MD   rOPINIRole (REQUIP) 0.25 MG tablet Take 1 tablet by mouth Every Night. Take 1 hour before bedtime.    Bre Caal MD  "    I have utilized all available immediate resources to obtain, update, or review the patient's current medications (including all prescriptions, over-the-counter products, herbals, cannabis/cannabidiol products, and vitamin/mineral/dietary (nutritional) supplements).    Objective     Vital Signs: BP 95/48 (BP Location: Left arm, Patient Position: Sitting)   Pulse 64   Temp 98 °F (36.7 °C) (Oral)   Resp 18   Ht 160 cm (63\")   Wt 45.4 kg (100 lb)   SpO2 96%   BMI 17.71 kg/m²   Physical Exam  Vitals reviewed.   Constitutional:       Appearance: She is ill-appearing.      Comments: Elderly and frail appearing   HENT:      Head: Normocephalic.      Mouth/Throat:      Mouth: Mucous membranes are dry.   Eyes:      Pupils: Pupils are equal, round, and reactive to light.   Cardiovascular:      Rate and Rhythm: Normal rate.   Pulmonary:      Effort: Pulmonary effort is normal. No respiratory distress.      Breath sounds: Rhonchi present.   Abdominal:      Palpations: Abdomen is soft.      Tenderness: There is abdominal tenderness (lower abdominal fullness).   Skin:     Comments: Pressure injury with large opening and malodorous discharge noted right lateral hip region; tunneling to the bone.  Mild surrounding erythema.  Area is tender to palpation.   Neurological:      Mental Status: She is alert. She is disoriented.      Comments: Alert; answers simple yes/no questions; family reports she appears a bit more confused than her baseline              Results Reviewed:  Lab Results (last 24 hours)       Procedure Component Value Units Date/Time    Wound Culture - Wound, Hip, Right [[id number]] Collected: 10/16/24 1555    Specimen: Wound from Hip, Right Updated: 10/16/24 1806     Gram Stain Many (4+) Gram positive cocci      Many (4+) Gram negative bacilli      Many (4+) WBCs seen    Ferritin [[id number]]  (Abnormal) Collected: 10/16/24 1549    Specimen: Blood Updated: 10/16/24 1735     Ferritin 2,435.00 ng/mL     " Narrative:      Results may be falsely decreased if patient taking Biotin.      Respiratory Panel PCR w/COVID-19(SARS-CoV-2) [facility] In-House, NP Swab in UTM/VTM, 2 HR TAT - Swab, Nasopharynx [[id number]]  (Abnormal) Collected: 10/16/24 1553    Specimen: Swab from Nasopharynx Updated: 10/16/24 1710     ADENOVIRUS, PCR Not Detected     Coronavirus 229E Not Detected     Coronavirus HKU1 Not Detected     Coronavirus NL63 Not Detected     Coronavirus OC43 Not Detected     COVID19 Not Detected     Human Metapneumovirus Detected     Human Rhinovirus/Enterovirus Not Detected     Influenza A PCR Not Detected     Influenza B PCR Not Detected     Parainfluenza Virus 1 Not Detected     Parainfluenza Virus 2 Not Detected     Parainfluenza Virus 3 Not Detected     Parainfluenza Virus 4 Not Detected     RSV, PCR Not Detected     Bordetella pertussis pcr Not Detected     Bordetella parapertussis PCR Not Detected     Chlamydophila pneumoniae PCR Not Detected     Mycoplasma pneumo by PCR Not Detected    Narrative:      In the setting of a positive respiratory panel with a viral infection PLUS a negative procalcitonin without other underlying concern for bacterial infection, consider observing off antibiotics or discontinuation of antibiotics and continue supportive care. If the respiratory panel is positive for atypical bacterial infection (Bordetella pertussis, Chlamydophila pneumoniae, or Mycoplasma pneumoniae), consider antibiotic de-escalation to target atypical bacterial infection.    Blood Culture - Blood, Arm, Left [[id number]] Collected: 10/16/24 1612    Specimen: Blood from Arm, Left Updated: 10/16/24 1649    CBC & Differential [[id number]]  (Abnormal) Collected: 10/16/24 1549    Specimen: Blood Updated: 10/16/24 1642    Narrative:      The following orders were created for panel order CBC & Differential.  Procedure                               Abnormality         Status                     ---------                                -----------         ------                     CBC Auto Differential[[id number]]        Abnormal            Final result                 Please view results for these tests on the individual orders.    CBC Auto Differential [[id number]]  (Abnormal) Collected: 10/16/24 1549    Specimen: Blood Updated: 10/16/24 1642     WBC 11.89 10*3/mm3      RBC 2.55 10*6/mm3      Hemoglobin 8.6 g/dL      Hematocrit 26.4 %      .5 fL      MCH 33.7 pg      MCHC 32.6 g/dL      RDW 14.0 %      RDW-SD 52.8 fl      MPV 9.6 fL      Platelets 198 10*3/mm3     Narrative:      The previously reported component NRBC is no longer being reported. Previous result was 0.0 /100 WBC (Reference Range: 0.0-0.2 /100 WBC) on 10/16/2024 at 1619 CDT.    Manual Differential [[id number]]  (Abnormal) Collected: 10/16/24 1549    Specimen: Blood Updated: 10/16/24 1642     Neutrophil % 91.9 %      Lymphocyte % 3.0 %      Monocyte % 2.0 %      Eosinophil % 0.0 %      Basophil % 0.0 %      Bands %  2.0 %      Atypical Lymphocyte % 1.0 %      Neutrophils Absolute 11.17 10*3/mm3      Lymphocytes Absolute 0.48 10*3/mm3      Monocytes Absolute 0.24 10*3/mm3      Eosinophils Absolute 0.00 10*3/mm3      Basophils Absolute 0.00 10*3/mm3      Anisocytosis Slight/1+     Elliptocytes Slight/1+     Macrocytes Slight/1+     Poikilocytes Slight/1+     Toxic Granulation Slight/1+     Platelet Morphology Normal    Lactic Acid, Plasma [[id number]]  (Normal) Collected: 10/16/24 1549    Specimen: Blood Updated: 10/16/24 1633     Lactate 1.6 mmol/L     Comprehensive Metabolic Panel [[id number]]  (Abnormal) Collected: 10/16/24 1549    Specimen: Blood Updated: 10/16/24 1633     Glucose 119 mg/dL      BUN 55 mg/dL      Creatinine 2.01 mg/dL      Sodium 135 mmol/L      Potassium 3.8 mmol/L      Chloride 101 mmol/L      CO2 22.0 mmol/L      Calcium 8.7 mg/dL      Total Protein 7.2 g/dL      Albumin 3.4 g/dL      ALT (SGPT) 565 U/L      AST (SGOT) 331 U/L       Alkaline Phosphatase 171 U/L      Total Bilirubin 0.6 mg/dL      Globulin 3.8 gm/dL      A/G Ratio 0.9 g/dL      BUN/Creatinine Ratio 27.4     Anion Gap 12.0 mmol/L      eGFR 23.6 mL/min/1.73     Narrative:      GFR Normal >60  Chronic Kidney Disease <60  Kidney Failure <15    The GFR formula is only valid for adults with stable renal function between ages 18 and 70.    Blood Culture - Blood, Arm, Right [[id number]] Collected: 10/16/24 1549    Specimen: Blood from Arm, Right Updated: 10/16/24 1609          Imaging Results (Last 24 Hours)       Procedure Component Value Units Date/Time    CT Abdomen Pelvis Without Contrast [[id number]] Collected: 10/16/24 1713     Updated: 10/16/24 1723    Narrative:      EXAMINATION:  CT ABDOMEN PELVIS WO CONTRAST-  10/16/2024 3:56 PM     HISTORY: Right hip wound.     TECHNIQUE: Spiral CT was performed of the abdomen and pelvis without  contrast. Multiplanar images were reconstructed.     DLP: 175.78 mGy.cm Automated dosage reduction technique was utilized to  reduce patient dosage.     COMPARISON: 9/28/2015.     LUNG BASES: There is atelectasis in both lung bases. There is patchy  consolidation in the lingula. There is coronary artery calcification.  There is aortic and mitral valve calcification.     LIVER AND SPLEEN: The liver is very dense with a Hounsfield unit  measurement of 116. The spleen is normal density with multiple calcified  granulomas. There has been prior cholecystectomy.     PANCREAS: Normal unenhanced appearance of the pancreas.     KIDNEYS AND ADRENALS: The adrenal glands and right kidney are  unremarkable. There is a 3.5 cm left renal cyst with a Hounsfield  measurement of 11. The renal collecting systems are nondilated. There is  mild distention of the urinary bladder. There is air in the bladder.     BOWEL: No oral contrast was given. There is under distention of the  stomach. There is gastric wall thickening. Small bowel loops are  nondilated. There is moderate  to large stool in the colon. There is  diverticulosis of the colon. There is fecal impaction of the rectum  measuring 8.9 cm transversely.     OTHER: There is atheromatous disease of the aortoiliac vessels. There  has been prior bilateral hip arthroplasty. There is narrowing and  spurring of the symphysis pubis. There are degenerative changes of the  spine. There is lumbar scoliosis convex to the left. There is skin  ulceration lateral to the right ilium. There is some air within the soft  tissues in this area. There is a phlegmon measuring about 5.6 x 3.1 cm.  Simple fluid is not seen in this area. This extends to the lateral  margin of the ilium. I do not see any iliac erosive change or  destruction to suggest osteomyelitis involving the right ilium.          Impression:      1. Skin ulceration in the right pelvic region lateral to the ileum with  a 5.6 x 3.1 cm collection that is not simple fluid. This is likely an  inflammatory mass/phlegmon. There is some air in the soft tissues in  this area. This abuts the lateral margin of the right ilium. There is no  right ilium erosion or destruction to suggest osteomyelitis.  2. Atelectasis in the lung bases. Patchy lingular consolidation may  represent atelectasis or pneumonia. Coronary artery calcification.  Aortic and mitral valve calcification.  3. The liver is very dense. Hemachromatosis is considered. Increased  liver density may also be seen with chronic amiodarone therapy.  4. A 3.5 cm left renal cyst.  5. Mild distention of the urinary bladder. There is air in the urinary  bladder. Correlate with instrumentation.  6. Gastric wall thickening likely due to under distention. Gastritis and  other etiologies are also considered.  7. Diverticulosis of the colon. Fecal impaction of the rectum measuring  8.9 cm transversely.  8. Other nonacute findings, as discussed above.        The full report of this exam was immediately signed and available to the  emergency room.  The patient is currently in the emergency room.     This report was signed and finalized on 10/16/2024 5:20 PM by Dr. Yunior Chanel MD.       XR Chest 1 View [[id number]] Collected: 10/16/24 1508     Updated: 10/16/24 1512    Narrative:      EXAM: XR CHEST 1 VW-      DATE: 10/16/2024 2:03 PM     HISTORY: Cough and congestion       COMPARISON: 8/15/2017.     TECHNIQUE:  Frontal view(s) of the chest submitted.     FINDINGS:    There are bilateral opacities which may be related to edema or  multifocal pneumonia. There is elevation of the right hemidiaphragm. No  effusion or pneumothorax is seen. There is calcification of the thoracic  aorta. There is bilateral glenohumeral joint osteoarthritis and AC joint  arthropathy.          Impression:         1. Bilateral opacities in the lungs may be due to edema or multifocal  pneumonia.     This report was signed and finalized on 10/16/2024 3:09 PM by Jose Hooper.             I have personally reviewed and interpreted the radiology studies and ECG obtained at time of admission.     Assessment / Plan   Assessment:   Active Hospital Problems    Diagnosis     **ARF (acute renal failure)     Pressure ulcer, hip, right, unstageable     Infected wound     Abnormal LFTs     Advanced age     Acute urinary retention     Pneumonia due to human metapneumovirus     Chronic constipation     Anemia        Treatment Plan  Discussed with Dr. Alegria with General Surgery  NPO at midnight and to OR tomorrow for washout of wound, and likely application of wound vac  Follow-up wound culture (right hip)  Follow-up blood cultures  Bolus with 500mL of IVF now then maintenance fluids thereafter  Check bladder scan; anticipate she may require I/O catheterization vs. Belcher  IV Vancomycin  Check MRSA PCR  IV Zosyn  Follow-up UA - still pending  Bowel regimen for constipation  Repeat CBC, CMP, coags in AM  SCDs  Workup ongoing    Medical Decision Making  Number and Complexity of problems: 3 acute;  high complexity      Conditions and Status        Condition is worsening.     [facility] Data  External documents reviewed: none  Cardiac tracing (EKG, telemetry) interpretation: no new EKGs  Radiology interpretation: results of CT A/P personally reviewed by me including fluid collection right lateral hip region  Labs reviewed: as above  Any tests that were considered but not ordered: none     Decision rules/scores evaluated (example [id number], Wells, etc): none     Discussed with: patient, family at bedside, Dr. Alegria, Dr. Chapman     Care Planning  Shared decision making: Discussed with patient and family with agreement to proceed with treatment plan as outlined  Code status and discussions: Based upon discussion with family at bedside they indicate full CODE STATUS at this time    Disposition  Social Determinants of Health that impact treatment or disposition: Not apparent at this time  Estimated length of stay is greater than 2 days    I confirmed that the patient's advanced care plan is present, code status is documented, and a surrogate decision maker is listed in the patient's medical record.     The patient's surrogate decision maker is her daughter        Electronically signed by Gilbert Palmer MD, 10/16/24, 18:17 CDT.

## 2024-10-17 ENCOUNTER — ANESTHESIA EVENT (OUTPATIENT)
Dept: PERIOP | Facility: HOSPITAL | Age: 89
End: 2024-10-17
Payer: MEDICARE

## 2024-10-17 ENCOUNTER — ANESTHESIA (OUTPATIENT)
Dept: PERIOP | Facility: HOSPITAL | Age: 89
End: 2024-10-17
Payer: MEDICARE

## 2024-10-17 PROBLEM — K56.41 FECAL IMPACTION: Status: ACTIVE | Noted: 2024-10-17

## 2024-10-17 LAB
ALBUMIN SERPL-MCNC: 3.1 G/DL (ref 3.5–5.2)
ALBUMIN/GLOB SERPL: 0.9 G/DL
ALP SERPL-CCNC: 149 U/L (ref 39–117)
ALT SERPL W P-5'-P-CCNC: 445 U/L (ref 1–33)
ANION GAP SERPL CALCULATED.3IONS-SCNC: 11 MMOL/L (ref 5–15)
ANISOCYTOSIS BLD QL: ABNORMAL
AST SERPL-CCNC: 243 U/L (ref 1–32)
BASOPHILS # BLD MANUAL: 0 10*3/MM3 (ref 0–0.2)
BASOPHILS NFR BLD MANUAL: 0 % (ref 0–1.5)
BILIRUB SERPL-MCNC: 0.6 MG/DL (ref 0–1.2)
BILIRUB UR QL STRIP: NEGATIVE
BUN SERPL-MCNC: 45 MG/DL (ref 8–23)
BUN/CREAT SERPL: 26.2 (ref 7–25)
BURR CELLS BLD QL SMEAR: ABNORMAL
CALCIUM SPEC-SCNC: 8.2 MG/DL (ref 8.6–10.5)
CHLORIDE SERPL-SCNC: 102 MMOL/L (ref 98–107)
CLARITY UR: CLEAR
CO2 SERPL-SCNC: 23 MMOL/L (ref 22–29)
COLOR UR: YELLOW
CREAT SERPL-MCNC: 1.72 MG/DL (ref 0.57–1)
DEPRECATED RDW RBC AUTO: 53.4 FL (ref 37–54)
EGFRCR SERPLBLD CKD-EPI 2021: 28.5 ML/MIN/1.73
ELLIPTOCYTES BLD QL SMEAR: ABNORMAL
EOSINOPHIL # BLD MANUAL: 0 10*3/MM3 (ref 0–0.4)
EOSINOPHIL NFR BLD MANUAL: 0 % (ref 0.3–6.2)
ERYTHROCYTE [DISTWIDTH] IN BLOOD BY AUTOMATED COUNT: 13.9 % (ref 12.3–15.4)
FOLATE SERPL-MCNC: >20 NG/ML (ref 4.78–24.2)
GLOBULIN UR ELPH-MCNC: 3.4 GM/DL
GLUCOSE SERPL-MCNC: 92 MG/DL (ref 65–99)
GLUCOSE UR STRIP-MCNC: NEGATIVE MG/DL
HCT VFR BLD AUTO: 26.6 % (ref 34–46.6)
HGB BLD-MCNC: 8.6 G/DL (ref 12–15.9)
HGB UR QL STRIP.AUTO: NEGATIVE
INR PPP: 1.04 (ref 0.91–1.09)
KETONES UR QL STRIP: NEGATIVE
LEUKOCYTE ESTERASE UR QL STRIP.AUTO: NEGATIVE
LYMPHOCYTES # BLD MANUAL: 1.14 10*3/MM3 (ref 0.7–3.1)
LYMPHOCYTES NFR BLD MANUAL: 0 % (ref 5–12)
MACROCYTES BLD QL SMEAR: ABNORMAL
MCH RBC QN AUTO: 34 PG (ref 26.6–33)
MCHC RBC AUTO-ENTMCNC: 32.3 G/DL (ref 31.5–35.7)
MCV RBC AUTO: 105.1 FL (ref 79–97)
METAMYELOCYTES NFR BLD MANUAL: 1 % (ref 0–0)
MONOCYTES # BLD: 0 10*3/MM3 (ref 0.1–0.9)
MRSA DNA SPEC QL NAA+PROBE: NORMAL
NEUTROPHILS # BLD AUTO: 10.06 10*3/MM3 (ref 1.7–7)
NEUTROPHILS NFR BLD MANUAL: 85.9 % (ref 42.7–76)
NEUTS BAND NFR BLD MANUAL: 3 % (ref 0–5)
NEUTS VAC BLD QL SMEAR: ABNORMAL
NITRITE UR QL STRIP: NEGATIVE
PH UR STRIP.AUTO: 6.5 [PH] (ref 5–8)
PLAT MORPH BLD: NORMAL
PLATELET # BLD AUTO: 195 10*3/MM3 (ref 140–450)
PMV BLD AUTO: 9.8 FL (ref 6–12)
POIKILOCYTOSIS BLD QL SMEAR: ABNORMAL
POLYCHROMASIA BLD QL SMEAR: ABNORMAL
POTASSIUM SERPL-SCNC: 3.5 MMOL/L (ref 3.5–5.2)
PROT SERPL-MCNC: 6.5 G/DL (ref 6–8.5)
PROT UR QL STRIP: ABNORMAL
PROTHROMBIN TIME: 14 SECONDS (ref 11.8–14.8)
RBC # BLD AUTO: 2.53 10*6/MM3 (ref 3.77–5.28)
SODIUM SERPL-SCNC: 136 MMOL/L (ref 136–145)
SP GR UR STRIP: 1.01 (ref 1–1.03)
UROBILINOGEN UR QL STRIP: ABNORMAL
VARIANT LYMPHS NFR BLD MANUAL: 10.1 % (ref 19.6–45.3)
VIT B12 BLD-MCNC: >2000 PG/ML (ref 211–946)
WBC NRBC COR # BLD AUTO: 11.32 10*3/MM3 (ref 3.4–10.8)

## 2024-10-17 PROCEDURE — 25010000002 VANCOMYCIN 500 MG/100ML SOLUTION: Performed by: GENERAL PRACTICE

## 2024-10-17 PROCEDURE — 87641 MR-STAPH DNA AMP PROBE: CPT | Performed by: INTERNAL MEDICINE

## 2024-10-17 PROCEDURE — 82746 ASSAY OF FOLIC ACID SERUM: CPT | Performed by: INTERNAL MEDICINE

## 2024-10-17 PROCEDURE — 87075 CULTR BACTERIA EXCEPT BLOOD: CPT | Performed by: GENERAL PRACTICE

## 2024-10-17 PROCEDURE — 25010000002 PIPERACILLIN SOD-TAZOBACTAM PER 1 G: Performed by: GENERAL PRACTICE

## 2024-10-17 PROCEDURE — 87076 CULTURE ANAEROBE IDENT EACH: CPT | Performed by: GENERAL PRACTICE

## 2024-10-17 PROCEDURE — 82607 VITAMIN B-12: CPT | Performed by: INTERNAL MEDICINE

## 2024-10-17 PROCEDURE — 25010000002 ONDANSETRON PER 1 MG: Performed by: NURSE ANESTHETIST, CERTIFIED REGISTERED

## 2024-10-17 PROCEDURE — 25810000003 LACTATED RINGERS PER 1000 ML: Performed by: NURSE ANESTHETIST, CERTIFIED REGISTERED

## 2024-10-17 PROCEDURE — 87205 SMEAR GRAM STAIN: CPT | Performed by: GENERAL PRACTICE

## 2024-10-17 PROCEDURE — 87070 CULTURE OTHR SPECIMN AEROBIC: CPT | Performed by: GENERAL PRACTICE

## 2024-10-17 PROCEDURE — 94799 UNLISTED PULMONARY SVC/PX: CPT

## 2024-10-17 PROCEDURE — 25010000002 DEXAMETHASONE PER 1 MG: Performed by: NURSE ANESTHETIST, CERTIFIED REGISTERED

## 2024-10-17 PROCEDURE — 11043 DBRDMT MUSC&/FSCA 1ST 20/<: CPT | Performed by: GENERAL PRACTICE

## 2024-10-17 PROCEDURE — 25010000002 PROPOFOL 10 MG/ML EMULSION: Performed by: NURSE ANESTHETIST, CERTIFIED REGISTERED

## 2024-10-17 PROCEDURE — 85007 BL SMEAR W/DIFF WBC COUNT: CPT | Performed by: INTERNAL MEDICINE

## 2024-10-17 PROCEDURE — 45915 REMOVE RECTAL OBSTRUCTION: CPT | Performed by: GENERAL PRACTICE

## 2024-10-17 PROCEDURE — 99024 POSTOP FOLLOW-UP VISIT: CPT | Performed by: GENERAL PRACTICE

## 2024-10-17 PROCEDURE — 85025 COMPLETE CBC W/AUTO DIFF WBC: CPT | Performed by: INTERNAL MEDICINE

## 2024-10-17 PROCEDURE — P9041 ALBUMIN (HUMAN),5%, 50ML: HCPCS | Performed by: NURSE ANESTHETIST, CERTIFIED REGISTERED

## 2024-10-17 PROCEDURE — 81003 URINALYSIS AUTO W/O SCOPE: CPT | Performed by: INTERNAL MEDICINE

## 2024-10-17 PROCEDURE — 0JDC0ZZ EXTRACTION OF PELVIC REGION SUBCUTANEOUS TISSUE AND FASCIA, OPEN APPROACH: ICD-10-PCS | Performed by: GENERAL PRACTICE

## 2024-10-17 PROCEDURE — 87015 SPECIMEN INFECT AGNT CONCNTJ: CPT | Performed by: GENERAL PRACTICE

## 2024-10-17 PROCEDURE — 36415 COLL VENOUS BLD VENIPUNCTURE: CPT | Performed by: INTERNAL MEDICINE

## 2024-10-17 PROCEDURE — 25010000002 LIDOCAINE PF 2% 2 % SOLUTION: Performed by: NURSE ANESTHETIST, CERTIFIED REGISTERED

## 2024-10-17 PROCEDURE — 97607 NEG PRS WND THR NDME<=50SQCM: CPT | Performed by: GENERAL PRACTICE

## 2024-10-17 PROCEDURE — 94640 AIRWAY INHALATION TREATMENT: CPT

## 2024-10-17 PROCEDURE — 25010000002 ALBUMIN HUMAN 5% PER 50 ML: Performed by: NURSE ANESTHETIST, CERTIFIED REGISTERED

## 2024-10-17 PROCEDURE — 80053 COMPREHEN METABOLIC PANEL: CPT | Performed by: INTERNAL MEDICINE

## 2024-10-17 PROCEDURE — 0DCP7ZZ EXTIRPATION OF MATTER FROM RECTUM, VIA NATURAL OR ARTIFICIAL OPENING: ICD-10-PCS | Performed by: GENERAL PRACTICE

## 2024-10-17 PROCEDURE — 25810000003 LACTATED RINGERS PER 1000 ML: Performed by: GENERAL PRACTICE

## 2024-10-17 PROCEDURE — 85610 PROTHROMBIN TIME: CPT | Performed by: INTERNAL MEDICINE

## 2024-10-17 RX ORDER — DROPERIDOL 2.5 MG/ML
0.62 INJECTION, SOLUTION INTRAMUSCULAR; INTRAVENOUS ONCE AS NEEDED
Status: DISCONTINUED | OUTPATIENT
Start: 2024-10-17 | End: 2024-10-17 | Stop reason: HOSPADM

## 2024-10-17 RX ORDER — NALOXONE HCL 0.4 MG/ML
0.04 VIAL (ML) INJECTION AS NEEDED
Status: DISCONTINUED | OUTPATIENT
Start: 2024-10-17 | End: 2024-10-17 | Stop reason: HOSPADM

## 2024-10-17 RX ORDER — HYDROMORPHONE HYDROCHLORIDE 1 MG/ML
0.5 INJECTION, SOLUTION INTRAMUSCULAR; INTRAVENOUS; SUBCUTANEOUS
Status: DISCONTINUED | OUTPATIENT
Start: 2024-10-17 | End: 2024-10-17 | Stop reason: HOSPADM

## 2024-10-17 RX ORDER — OXYCODONE AND ACETAMINOPHEN 10; 325 MG/1; MG/1
1 TABLET ORAL EVERY 4 HOURS PRN
Status: DISCONTINUED | OUTPATIENT
Start: 2024-10-17 | End: 2024-10-17 | Stop reason: HOSPADM

## 2024-10-17 RX ORDER — MAGNESIUM HYDROXIDE 1200 MG/15ML
LIQUID ORAL AS NEEDED
Status: DISCONTINUED | OUTPATIENT
Start: 2024-10-17 | End: 2024-10-17 | Stop reason: HOSPADM

## 2024-10-17 RX ORDER — BISACODYL 5 MG/1
5 TABLET, DELAYED RELEASE ORAL DAILY
Status: DISCONTINUED | OUTPATIENT
Start: 2024-10-18 | End: 2024-10-23 | Stop reason: HOSPADM

## 2024-10-17 RX ORDER — ALBUMIN, HUMAN INJ 5% 5 %
SOLUTION INTRAVENOUS CONTINUOUS PRN
Status: DISCONTINUED | OUTPATIENT
Start: 2024-10-17 | End: 2024-10-17 | Stop reason: SURG

## 2024-10-17 RX ORDER — AMOXICILLIN 250 MG
2 CAPSULE ORAL 2 TIMES DAILY
Status: DISCONTINUED | OUTPATIENT
Start: 2024-10-17 | End: 2024-10-23 | Stop reason: HOSPADM

## 2024-10-17 RX ORDER — BISACODYL 10 MG
10 SUPPOSITORY, RECTAL RECTAL DAILY
Status: DISCONTINUED | OUTPATIENT
Start: 2024-10-17 | End: 2024-10-23 | Stop reason: HOSPADM

## 2024-10-17 RX ORDER — LIDOCAINE HYDROCHLORIDE 20 MG/ML
INJECTION, SOLUTION EPIDURAL; INFILTRATION; INTRACAUDAL; PERINEURAL AS NEEDED
Status: DISCONTINUED | OUTPATIENT
Start: 2024-10-17 | End: 2024-10-17 | Stop reason: SURG

## 2024-10-17 RX ORDER — ONDANSETRON 2 MG/ML
INJECTION INTRAMUSCULAR; INTRAVENOUS AS NEEDED
Status: DISCONTINUED | OUTPATIENT
Start: 2024-10-17 | End: 2024-10-17 | Stop reason: SURG

## 2024-10-17 RX ORDER — POTASSIUM CHLORIDE 750 MG/1
10 TABLET, EXTENDED RELEASE ORAL DAILY
COMMUNITY
End: 2024-10-23 | Stop reason: HOSPADM

## 2024-10-17 RX ORDER — PANTOPRAZOLE SODIUM 40 MG/1
1 TABLET, DELAYED RELEASE ORAL DAILY
COMMUNITY
Start: 2024-10-17

## 2024-10-17 RX ORDER — SODIUM CHLORIDE, SODIUM LACTATE, POTASSIUM CHLORIDE, CALCIUM CHLORIDE 600; 310; 30; 20 MG/100ML; MG/100ML; MG/100ML; MG/100ML
INJECTION, SOLUTION INTRAVENOUS CONTINUOUS PRN
Status: DISCONTINUED | OUTPATIENT
Start: 2024-10-17 | End: 2024-10-17 | Stop reason: SURG

## 2024-10-17 RX ORDER — POLYETHYLENE GLYCOL 3350 17 G/17G
17 POWDER, FOR SOLUTION ORAL DAILY
Status: DISCONTINUED | OUTPATIENT
Start: 2024-10-18 | End: 2024-10-23 | Stop reason: HOSPADM

## 2024-10-17 RX ORDER — EPHEDRINE SULFATE 50 MG/ML
INJECTION INTRAVENOUS AS NEEDED
Status: DISCONTINUED | OUTPATIENT
Start: 2024-10-17 | End: 2024-10-17 | Stop reason: SURG

## 2024-10-17 RX ORDER — IPRATROPIUM BROMIDE AND ALBUTEROL SULFATE 2.5; .5 MG/3ML; MG/3ML
3 SOLUTION RESPIRATORY (INHALATION)
Status: DISCONTINUED | OUTPATIENT
Start: 2024-10-17 | End: 2024-10-23 | Stop reason: HOSPADM

## 2024-10-17 RX ORDER — ONDANSETRON 2 MG/ML
4 INJECTION INTRAMUSCULAR; INTRAVENOUS
Status: DISCONTINUED | OUTPATIENT
Start: 2024-10-17 | End: 2024-10-17 | Stop reason: HOSPADM

## 2024-10-17 RX ORDER — CALCIUM CHLORIDE 100 MG/ML
INJECTION INTRAVENOUS; INTRAVENTRICULAR AS NEEDED
Status: DISCONTINUED | OUTPATIENT
Start: 2024-10-17 | End: 2024-10-17 | Stop reason: SURG

## 2024-10-17 RX ORDER — SPIRONOLACTONE 25 MG/1
25 TABLET ORAL DAILY
COMMUNITY
End: 2024-10-23 | Stop reason: HOSPADM

## 2024-10-17 RX ORDER — LABETALOL HYDROCHLORIDE 5 MG/ML
5 INJECTION, SOLUTION INTRAVENOUS
Status: DISCONTINUED | OUTPATIENT
Start: 2024-10-17 | End: 2024-10-17 | Stop reason: HOSPADM

## 2024-10-17 RX ORDER — IBUPROFEN 600 MG/1
600 TABLET, FILM COATED ORAL EVERY 6 HOURS PRN
Status: DISCONTINUED | OUTPATIENT
Start: 2024-10-17 | End: 2024-10-17 | Stop reason: HOSPADM

## 2024-10-17 RX ORDER — PROPOFOL 10 MG/ML
VIAL (ML) INTRAVENOUS AS NEEDED
Status: DISCONTINUED | OUTPATIENT
Start: 2024-10-17 | End: 2024-10-17 | Stop reason: SURG

## 2024-10-17 RX ORDER — ONDANSETRON 4 MG/1
1 TABLET, ORALLY DISINTEGRATING ORAL EVERY 8 HOURS PRN
COMMUNITY
Start: 2024-10-17

## 2024-10-17 RX ORDER — FLUMAZENIL 0.1 MG/ML
0.2 INJECTION INTRAVENOUS AS NEEDED
Status: DISCONTINUED | OUTPATIENT
Start: 2024-10-17 | End: 2024-10-17 | Stop reason: HOSPADM

## 2024-10-17 RX ORDER — DEXAMETHASONE SODIUM PHOSPHATE 4 MG/ML
INJECTION, SOLUTION INTRA-ARTICULAR; INTRALESIONAL; INTRAMUSCULAR; INTRAVENOUS; SOFT TISSUE AS NEEDED
Status: DISCONTINUED | OUTPATIENT
Start: 2024-10-17 | End: 2024-10-17 | Stop reason: SURG

## 2024-10-17 RX ORDER — FENTANYL CITRATE 50 UG/ML
50 INJECTION, SOLUTION INTRAMUSCULAR; INTRAVENOUS
Status: DISCONTINUED | OUTPATIENT
Start: 2024-10-17 | End: 2024-10-17 | Stop reason: HOSPADM

## 2024-10-17 RX ADMIN — CITALOPRAM HYDROBROMIDE 20 MG: 20 TABLET, FILM COATED ORAL at 16:10

## 2024-10-17 RX ADMIN — SODIUM CHLORIDE, POTASSIUM CHLORIDE, SODIUM LACTATE AND CALCIUM CHLORIDE: 600; 310; 30; 20 INJECTION, SOLUTION INTRAVENOUS at 10:19

## 2024-10-17 RX ADMIN — PROPOFOL 100 MG: 10 INJECTION, EMULSION INTRAVENOUS at 10:20

## 2024-10-17 RX ADMIN — Medication 10 ML: at 20:33

## 2024-10-17 RX ADMIN — EPHEDRINE SULFATE 25 MG: 50 INJECTION INTRAVENOUS at 10:38

## 2024-10-17 RX ADMIN — CALCIUM CHLORIDE 0.5 G: 100 INJECTION INTRAVENOUS; INTRAVENTRICULAR at 10:35

## 2024-10-17 RX ADMIN — DOCUSATE SODIUM 50 MG AND SENNOSIDES 8.6 MG 2 TABLET: 8.6; 5 TABLET, FILM COATED ORAL at 20:33

## 2024-10-17 RX ADMIN — IPRATROPIUM BROMIDE AND ALBUTEROL SULFATE 3 ML: .5; 3 SOLUTION RESPIRATORY (INHALATION) at 18:30

## 2024-10-17 RX ADMIN — LIDOCAINE HYDROCHLORIDE 50 MG: 20 INJECTION, SOLUTION EPIDURAL; INFILTRATION; INTRACAUDAL; PERINEURAL at 10:20

## 2024-10-17 RX ADMIN — ONDANSETRON 4 MG: 2 INJECTION INTRAMUSCULAR; INTRAVENOUS at 11:10

## 2024-10-17 RX ADMIN — VANCOMYCIN 500 MG: 500 INJECTION, SOLUTION INTRAVENOUS at 20:33

## 2024-10-17 RX ADMIN — DEXAMETHASONE SODIUM PHOSPHATE 4 MG: 4 INJECTION INTRA-ARTICULAR; INTRALESIONAL; INTRAMUSCULAR; INTRAVENOUS; SOFT TISSUE at 11:10

## 2024-10-17 RX ADMIN — ALBUMIN (HUMAN): 12.5 INJECTION, SOLUTION INTRAVENOUS at 10:53

## 2024-10-17 RX ADMIN — SODIUM CHLORIDE, POTASSIUM CHLORIDE, SODIUM LACTATE AND CALCIUM CHLORIDE 125 ML/HR: 600; 310; 30; 20 INJECTION, SOLUTION INTRAVENOUS at 16:10

## 2024-10-17 RX ADMIN — Medication 10 ML: at 10:08

## 2024-10-17 RX ADMIN — PIPERACILLIN AND TAZOBACTAM 3.38 G: 3; .375 INJECTION, POWDER, FOR SOLUTION INTRAVENOUS at 22:31

## 2024-10-17 RX ADMIN — ALBUMIN (HUMAN): 12.5 INJECTION, SOLUTION INTRAVENOUS at 10:35

## 2024-10-17 NOTE — PROGRESS NOTES
"Pharmacy Dosing Service  Antimicrobial Dosing  Zosyn    Assessment/Action/Plan:  Based on indication and renal function, Zosyn 3.375 gm IV every 12 hours. Pharmacy will continue to monitor daily and make further adjustment(s) accordingly.     Subjective:  Sofía Clifton is a 89 y.o. female with a  \"Pharmacy to Dose Zosyn\" consult for the treatment of SSTI , day 1 of 5 of treatment.    Objective:  Ht: 160 cm (62.99\"); Wt: 48.7 kg (107 lb 5.8 oz)  Estimated Creatinine Clearance: 15.2 mL/min (A) (by C-G formula based on SCr of 2.01 mg/dL (H)).   Creatinine   Date Value Ref Range Status   10/16/2024 2.01 (H) 0.57 - 1.00 mg/dL Final   07/12/2023 0.83 0.57 - 1.00 mg/dL Final   07/11/2023 0.93 0.57 - 1.00 mg/dL Final   01/24/2020 1.10 0.60 - 1.30 mg/dL Final   09/11/2019 0.8 0.5 - 0.9 mg/dL Final   09/10/2019 1.0 (H) 0.5 - 0.9 mg/dL Final      Lab Results   Component Value Date    WBC 11.89 (H) 10/16/2024    WBC 7.88 07/12/2023    WBC 7.24 07/11/2023      Baseline culture results:  Microbiology Results (last 10 days)       Procedure Component Value - Date/Time    Wound Culture - Wound, Hip, Right [[id number]] Collected: 10/16/24 6757    Lab Status: Preliminary result Specimen: Wound from Hip, Right Updated: 10/16/24 1806     Gram Stain Many (4+) Gram positive cocci      Many (4+) Gram negative bacilli      Many (4+) WBCs seen    Respiratory Panel PCR w/COVID-19(SARS-CoV-2) [facility] In-House, NP Swab in UTM/VTM, 2 HR TAT - Swab, Nasopharynx [[id number]]  (Abnormal) Collected: 10/16/24 1553    Lab Status: Final result Specimen: Swab from Nasopharynx Updated: 10/16/24 1710     ADENOVIRUS, PCR Not Detected     Coronavirus 229E Not Detected     Coronavirus HKU1 Not Detected     Coronavirus NL63 Not Detected     Coronavirus OC43 Not Detected     COVID19 Not Detected     Human Metapneumovirus Detected     Human Rhinovirus/Enterovirus Not Detected     Influenza A PCR Not Detected     Influenza B PCR Not Detected     " Parainfluenza Virus 1 Not Detected     Parainfluenza Virus 2 Not Detected     Parainfluenza Virus 3 Not Detected     Parainfluenza Virus 4 Not Detected     RSV, PCR Not Detected     Bordetella pertussis pcr Not Detected     Bordetella parapertussis PCR Not Detected     Chlamydophila pneumoniae PCR Not Detected     Mycoplasma pneumo by PCR Not Detected    Narrative:      In the setting of a positive respiratory panel with a viral infection PLUS a negative procalcitonin without other underlying concern for bacterial infection, consider observing off antibiotics or discontinuation of antibiotics and continue supportive care. If the respiratory panel is positive for atypical bacterial infection (Bordetella pertussis, Chlamydophila pneumoniae, or Mycoplasma pneumoniae), consider antibiotic de-escalation to target atypical bacterial infection.            Ruth Ziegler, PharmD  10/16/24 20:26 CDT

## 2024-10-17 NOTE — ANESTHESIA PROCEDURE NOTES
Airway  Urgency: elective    Date/Time: 10/17/2024 10:21 AM  Airway not difficult    General Information and Staff    Patient location during procedure: OR  CRNA/CAA: ARIC Ball CRNA    Indications and Patient Condition  Indications for airway management: airway protection    Preoxygenated: yes  MILS not maintained throughout  Mask difficulty assessment: 1 - vent by mask    Final Airway Details  Final airway type: supraglottic airway      Successful airway: ProSeal and I-gel  Size 4     Number of attempts at approach: 1  Assessment: lips, teeth, and gum same as pre-op and atraumatic intubation

## 2024-10-17 NOTE — PLAN OF CARE
Problem: Adult Inpatient Plan of Care  Goal: Plan of Care Review  Outcome: Progressing  Flowsheets (Taken 10/17/2024 7625)  Outcome Evaluation: Patient 1L NC, O2. IV CDI. Wound vac, CDI. No c/o pain, q2 turn. Confused, oriented to self. VSS, safety maintained.

## 2024-10-17 NOTE — PROGRESS NOTES
"Pharmacy Dosing Service  Pharmacokinetics  Vancomycin Initial Evaluation  Assessment/Action/Plan:  Loading dose?: 750 mg IV once  Current Order: Vancomycin 500 mg IVPB every 24 hours  Current end date:10/18/24  Levels: not ordered at this time  Additional antimicrobial agent(s): Zosyn  MRSA Nasal PCR ordered: Yes (Vancomycin indication is pneumonia, bone/joint, SSTI or IAI)    Vancomycin dosage initiated based on population pharmacokinetic parameters. Pharmacy will continue to follow daily and adjust dose accordingly.     Subjective:  Sofía Clifton is a 89 y.o. female with a Vancomycin \"Pharmacy to Dose\" consult for the treatment of SSTI , day 1 of 3 of treatment.    AUC Model Data:  Loading dose: 750 mg IV once.  Regimen: 500 mg IV every 24 hours.  Start time: 21:33 on 10/16/2024  Exposure target: AUC24 (range)400-600 mg/L.hr   AUC24,ss: 509 mg/L.hr  Probability of AUC24 > 400: 78 %  Ctrough,ss: 18 mg/L    Objective:  Ht: 160 cm (62.99\"); Wt: 48.7 kg (107 lb 5.8 oz)  Estimated Creatinine Clearance: 15.2 mL/min (A) (by C-G formula based on SCr of 2.01 mg/dL (H)).   Creatinine   Date Value Ref Range Status   10/16/2024 2.01 (H) 0.57 - 1.00 mg/dL Final   07/12/2023 0.83 0.57 - 1.00 mg/dL Final   07/11/2023 0.93 0.57 - 1.00 mg/dL Final   01/24/2020 1.10 0.60 - 1.30 mg/dL Final   09/11/2019 0.8 0.5 - 0.9 mg/dL Final   09/10/2019 1.0 (H) 0.5 - 0.9 mg/dL Final      Lab Results   Component Value Date    WBC 11.89 (H) 10/16/2024    WBC 7.88 07/12/2023    WBC 7.24 07/11/2023      Baseline culture results:  Microbiology Results (last 10 days)       Procedure Component Value - Date/Time    Wound Culture - Wound, Hip, Right [[id number]] Collected: 10/16/24 4200    Lab Status: Preliminary result Specimen: Wound from Hip, Right Updated: 10/16/24 3723     Gram Stain Many (4+) Gram positive cocci      Many (4+) Gram negative bacilli      Many (4+) WBCs seen    Respiratory Panel PCR w/COVID-19(SARS-CoV-2) [facility] " In-House, NP Swab in UTM/VTM, 2 HR TAT - Swab, Nasopharynx [[id number]]  (Abnormal) Collected: 10/16/24 6238    Lab Status: Final result Specimen: Swab from Nasopharynx Updated: 10/16/24 1710     ADENOVIRUS, PCR Not Detected     Coronavirus 229E Not Detected     Coronavirus HKU1 Not Detected     Coronavirus NL63 Not Detected     Coronavirus OC43 Not Detected     COVID19 Not Detected     Human Metapneumovirus Detected     Human Rhinovirus/Enterovirus Not Detected     Influenza A PCR Not Detected     Influenza B PCR Not Detected     Parainfluenza Virus 1 Not Detected     Parainfluenza Virus 2 Not Detected     Parainfluenza Virus 3 Not Detected     Parainfluenza Virus 4 Not Detected     RSV, PCR Not Detected     Bordetella pertussis pcr Not Detected     Bordetella parapertussis PCR Not Detected     Chlamydophila pneumoniae PCR Not Detected     Mycoplasma pneumo by PCR Not Detected    Narrative:      In the setting of a positive respiratory panel with a viral infection PLUS a negative procalcitonin without other underlying concern for bacterial infection, consider observing off antibiotics or discontinuation of antibiotics and continue supportive care. If the respiratory panel is positive for atypical bacterial infection (Bordetella pertussis, Chlamydophila pneumoniae, or Mycoplasma pneumoniae), consider antibiotic de-escalation to target atypical bacterial infection.            Ruth Ziegler, PharmD  10/16/24 20:36 CDT

## 2024-10-17 NOTE — PAYOR COMM NOTE
"Beronica Clifton (89 y.o. Female)   New INPT Request      [id number]       admit  10/16    [facility]   npi [id number]  Dr ISAURO Palmer   npi  [id number]          [id number]         Date of Birth   1935    Social Security Number       Address   [address] [place] [address]    Home Phone   [phone]    MRN   [id number]       Lutheran   Christian    Marital Status                               Admission Date   10/16/24    Admission Type   Emergency    Admitting Provider   Gilbert Palmer MD    Attending Provider   Gilbert Palmer MD    Department, Room/Bed   [facility] 3C, 391/1       Discharge Date       Discharge Disposition       Discharge Destination                                 Attending Provider: Gilbert Palmer MD    Allergies: Codeine, Morphine And Codeine, Norflex [Orphenadrine Citrate], Stadol [Butorphanol]    Isolation: Contact   Infection: Human Metapneumovirus  (10/16/24)   Code Status: CPR    Ht: 160 cm (62.99\")   Wt: 48.7 kg (107 lb 5.8 oz)    Admission Cmt: None   Principal Problem: ARF (acute renal failure) [N17.9]                   Active Insurance as of 10/16/2024       Primary Coverage       Payor Plan Insurance Group Employer/Plan Group    ANTHEM MEDICARE REPLACEMENT ANTHEM MEDICARE ADVANTAGE [id number]       Payor Plan Address Payor Plan Phone Number Payor Plan Fax Number Effective Dates    [address] [phone]  1/1/2016 - None Entered    [facility] [address]         Subscriber Name Subscriber Birth Date Member ID       BERONICA CLIFTON 1935 [id number]               Secondary Coverage       Payor Plan Insurance Group Employer/Plan Group     FOR LIFE  FOR LIFE MC SUP         Payor Plan Address Payor Plan Phone Number Payor Plan Fax Number Effective Dates    [address] [phone]  11/22/2016 - None Entered    [facility] [address]         Subscriber Name Subscriber Birth Date Member ID "       BERONICA YUN REJI 1935 [id number]                     Emergency Contacts        (Rel.) Home Phone Work Phone Mobile Phone    Edwar Yun (Son) -- -- [phone]    Meeta Haile (Daughter) [phone] -- [phone]                 History & Physical        Gilbert Palmer MD at 10/16/24 2928              [facility] Medicine Services  HISTORY AND PHYSICAL    Date of Admission: 10/16/2024  Primary Care Physician: Sukhi Pelayo MD    Subjective   Primary Historian: Patient's family    Chief Complaint: Right hip wound    History of Present Illness  Patient is an 89-year-old  female with past medical history significant for dementia, hypertension, chronic constipation, that presented to our facility today after being evaluated by home health, and workup being concerning for a worsening wound near her right hip.  Patient is not an ideal historian given her history of dementia, however family is at bedside to provide additional history.  It sounds like she has had a wound on the lateral aspect of her right hip that has been slowly getting worse for the past couple of weeks.  They indicate that she has been on antibiotics in the outpatient setting for a bladder infection recently, and recently finished up a 10-day course of Omnicef.  Over the past couple of days she has had worsening p.o. intake.  She has been more weak and fatigued, and even a little bit more confused as compared to her baseline.  The wound involving her right hip is progressively gotten worse with malodorous drainage.  It is unclear if patient has had any subjective fevers or chills leading up to this hospitalization.  Family at bedside reports that she does urinate frequently.  They also report that she is prone towards constipation, and typically tries to avoid having a bowel movement given a history of hemorrhoids.  Patient lives at home with one of her daughters.  Family  "indicates that she no longer walks, and primarily her mobility is transferring to a wheelchair.  Family did indicate that she spends a lot of time resting/lying on her right hip as that is her preferred position.    Review of Systems   Otherwise complete ROS reviewed and negative except as mentioned in the HPI.    Past Medical History:   Past Medical History:   Diagnosis Date    Anemia 11/18/2016    Benign essential hypertension 11/18/2016    Chronic constipation 11/18/2016    Degeneration of lumbar intervertebral disc 11/18/2016    Diverticulitis of colon 11/18/2016    Gastroesophageal reflux disease with esophagitis 11/18/2016    Hypercholesteremia 11/18/2016    Osteoarthritis 11/18/2016    Paroxysmal atrial fibrillation 11/18/2016     Past Surgical History:  Past Surgical History:   Procedure Laterality Date    APPENDECTOMY      BACK SURGERY      C-SPINE, LUMBAR SPINE    BLADDER SURGERY      CHOLECYSTECTOMY      ENDOSCOPY N/A 07/12/2023    Distal Esophagitis LA Grade A otherwise normal exam    ENDOSCOPY  09/29/2015    Non-bleeding erosive gastropathy    HYSTERECTOMY      JOINT REPLACEMENT Right     HIP    KNEE SURGERY Right     TOTAL HIP ARTHROPLASTY Left      Social History:  reports that she has never smoked. She has never used smokeless tobacco. She reports that she does not drink alcohol and does not use drugs.    Family History: family history includes Alzheimer's disease in her mother; Heart attack in her father and sister; Heart disease in her brother, brother, brother, brother, brother, brother, brother, father, mother, sister, and sister; No Known Problems in her brother, maternal grandfather, maternal grandmother, paternal grandfather, and paternal grandmother.       Allergies:  Allergies   Allergen Reactions    Codeine Hives    Morphine And Codeine      \"Burning inside\" per patient    Norflex [Orphenadrine Citrate] Hives    Stadol [Butorphanol] Mental Status Change and Irritability "       Medications:  Prior to Admission medications    Medication Sig Start Date End Date Taking? Authorizing Provider   amiodarone (PACERONE) 200 MG tablet Take 1 tablet by mouth 2 (Two) Times a Day. Indications: Atrial Fibrillation 7/9/23   Bre Caal MD   amLODIPine (NORVASC) 2.5 MG tablet Take 1 tablet by mouth Daily. 7/12/23   Xavier Collins DO   Calcium Carb-Cholecalciferol (CALCIUM 600/VITAMIN D3 PO) Take 1 tablet by mouth Daily.    Bre Caal MD   cetirizine (zyrTEC) 10 MG tablet Take 1 tablet by mouth Daily.    Bre Caal MD   ciprofloxacin (CIPRO) 500 MG tablet Take 500 mg by mouth 2 (Two) Times a Day. Indications: Bacteria in the Blood 10/16/24   Bre Caal MD   citalopram (CeleXA) 20 MG tablet Take 20 mg by mouth Daily. Indications: Major Depressive Disorder 10/16/24   Bre Caal MD   clopidogrel (PLAVIX) 75 MG tablet Take 1 tablet by mouth Daily. 7/15/23   Xavier Collins DO   docusate sodium (COLACE) 50 MG capsule Take  by mouth 2 (Two) Times a Day.    Bre Caal MD   furosemide (LASIX) 40 MG tablet Take 40 mg by mouth Daily. Indications: Cardiac Failure 10/16/24   Bre Caal MD   hydrALAZINE (APRESOLINE) 25 MG tablet Take 25 mg by mouth Daily. Indications: High Blood Pressure 10/16/24   Bre Caal MD   HYDROcodone-acetaminophen (NORCO)  MG per tablet Take 1 tablet by mouth Every 8 (Eight) Hours As Needed for Moderate Pain.    Bre Caal MD   isosorbide mononitrate (IMDUR) 30 MG 24 hr tablet Take 1 tablet by mouth Daily.    Bre Caal MD   levothyroxine (SYNTHROID, LEVOTHROID) 75 MCG tablet Take 1 tablet by mouth Every Morning.    Bre Caal MD   metoprolol tartrate (LOPRESSOR) 25 MG tablet Take 1 tablet by mouth 2 (Two) Times a Day.    Bre Caal MD   multivitamin with minerals tablet tablet Take 1 tablet by mouth Daily.    Bre Caal MD  "  pantoprazole (PROTONIX) 40 MG EC tablet Take 1 tablet by mouth 2 (Two) Times a Day Before Meals. 7/12/23   Xavier Collins,    pravastatin (PRAVACHOL) 40 MG tablet Take 1 tablet by mouth Daily.    Provider, MD Bre   rOPINIRole (REQUIP) 0.25 MG tablet Take 1 tablet by mouth Every Night. Take 1 hour before bedtime.    Provider, Bre, MD     I have utilized all available immediate resources to obtain, update, or review the patient's current medications (including all prescriptions, over-the-counter products, herbals, cannabis/cannabidiol products, and vitamin/mineral/dietary (nutritional) supplements).    Objective     Vital Signs: BP 95/48 (BP Location: Left arm, Patient Position: Sitting)   Pulse 64   Temp 98 °F (36.7 °C) (Oral)   Resp 18   Ht 160 cm (63\")   Wt 45.4 kg (100 lb)   SpO2 96%   BMI 17.71 kg/m²   Physical Exam  Vitals reviewed.   Constitutional:       Appearance: She is ill-appearing.      Comments: Elderly and frail appearing   HENT:      Head: Normocephalic.      Mouth/Throat:      Mouth: Mucous membranes are dry.   Eyes:      Pupils: Pupils are equal, round, and reactive to light.   Cardiovascular:      Rate and Rhythm: Normal rate.   Pulmonary:      Effort: Pulmonary effort is normal. No respiratory distress.      Breath sounds: Rhonchi present.   Abdominal:      Palpations: Abdomen is soft.      Tenderness: There is abdominal tenderness (lower abdominal fullness).   Skin:     Comments: Pressure injury with large opening and malodorous discharge noted right lateral hip region; tunneling to the bone.  Mild surrounding erythema.  Area is tender to palpation.   Neurological:      Mental Status: She is alert. She is disoriented.      Comments: Alert; answers simple yes/no questions; family reports she appears a bit more confused than her baseline              Results Reviewed:  Lab Results (last 24 hours)       Procedure Component Value Units Date/Time    Wound Culture - Wound, " Hip, Right [[id number]] Collected: 10/16/24 1555    Specimen: Wound from Hip, Right Updated: 10/16/24 1806     Gram Stain Many (4+) Gram positive cocci      Many (4+) Gram negative bacilli      Many (4+) WBCs seen    Ferritin [[id number]]  (Abnormal) Collected: 10/16/24 1549    Specimen: Blood Updated: 10/16/24 1735     Ferritin 2,435.00 ng/mL     Narrative:      Results may be falsely decreased if patient taking Biotin.      Respiratory Panel PCR w/COVID-19(SARS-CoV-2) EUNICE/CLARIBEL/TANNER/PAD/COR/JEROME In-House, NP Swab in UTM/VTM, 2 HR TAT - Swab, Nasopharynx [[id number]]  (Abnormal) Collected: 10/16/24 1553    Specimen: Swab from Nasopharynx Updated: 10/16/24 1710     ADENOVIRUS, PCR Not Detected     Coronavirus 229E Not Detected     Coronavirus HKU1 Not Detected     Coronavirus NL63 Not Detected     Coronavirus OC43 Not Detected     COVID19 Not Detected     Human Metapneumovirus Detected     Human Rhinovirus/Enterovirus Not Detected     Influenza A PCR Not Detected     Influenza B PCR Not Detected     Parainfluenza Virus 1 Not Detected     Parainfluenza Virus 2 Not Detected     Parainfluenza Virus 3 Not Detected     Parainfluenza Virus 4 Not Detected     RSV, PCR Not Detected     Bordetella pertussis pcr Not Detected     Bordetella parapertussis PCR Not Detected     Chlamydophila pneumoniae PCR Not Detected     Mycoplasma pneumo by PCR Not Detected    Narrative:      In the setting of a positive respiratory panel with a viral infection PLUS a negative procalcitonin without other underlying concern for bacterial infection, consider observing off antibiotics or discontinuation of antibiotics and continue supportive care. If the respiratory panel is positive for atypical bacterial infection (Bordetella pertussis, Chlamydophila pneumoniae, or Mycoplasma pneumoniae), consider antibiotic de-escalation to target atypical bacterial infection.    Blood Culture - Blood, Arm, Left [[id number]] Collected: 10/16/24 1612    Specimen: Blood  from Arm, Left Updated: 10/16/24 1649    CBC & Differential [[id number]]  (Abnormal) Collected: 10/16/24 1549    Specimen: Blood Updated: 10/16/24 1642    Narrative:      The following orders were created for panel order CBC & Differential.  Procedure                               Abnormality         Status                     ---------                               -----------         ------                     CBC Auto Differential[[id number]]        Abnormal            Final result                 Please view results for these tests on the individual orders.    CBC Auto Differential [[id number]]  (Abnormal) Collected: 10/16/24 1549    Specimen: Blood Updated: 10/16/24 1642     WBC 11.89 10*3/mm3      RBC 2.55 10*6/mm3      Hemoglobin 8.6 g/dL      Hematocrit 26.4 %      .5 fL      MCH 33.7 pg      MCHC 32.6 g/dL      RDW 14.0 %      RDW-SD 52.8 fl      MPV 9.6 fL      Platelets 198 10*3/mm3     Narrative:      The previously reported component NRBC is no longer being reported. Previous result was 0.0 /100 WBC (Reference Range: 0.0-0.2 /100 WBC) on 10/16/2024 at 1619 CDT.    Manual Differential [[id number]]  (Abnormal) Collected: 10/16/24 1549    Specimen: Blood Updated: 10/16/24 1642     Neutrophil % 91.9 %      Lymphocyte % 3.0 %      Monocyte % 2.0 %      Eosinophil % 0.0 %      Basophil % 0.0 %      Bands %  2.0 %      Atypical Lymphocyte % 1.0 %      Neutrophils Absolute 11.17 10*3/mm3      Lymphocytes Absolute 0.48 10*3/mm3      Monocytes Absolute 0.24 10*3/mm3      Eosinophils Absolute 0.00 10*3/mm3      Basophils Absolute 0.00 10*3/mm3      Anisocytosis Slight/1+     Elliptocytes Slight/1+     Macrocytes Slight/1+     Poikilocytes Slight/1+     Toxic Granulation Slight/1+     Platelet Morphology Normal    Lactic Acid, Plasma [[id number]]  (Normal) Collected: 10/16/24 1549    Specimen: Blood Updated: 10/16/24 1633     Lactate 1.6 mmol/L     Comprehensive Metabolic Panel [[id number]]  (Abnormal) Collected:  10/16/24 1549    Specimen: Blood Updated: 10/16/24 1633     Glucose 119 mg/dL      BUN 55 mg/dL      Creatinine 2.01 mg/dL      Sodium 135 mmol/L      Potassium 3.8 mmol/L      Chloride 101 mmol/L      CO2 22.0 mmol/L      Calcium 8.7 mg/dL      Total Protein 7.2 g/dL      Albumin 3.4 g/dL      ALT (SGPT) 565 U/L      AST (SGOT) 331 U/L      Alkaline Phosphatase 171 U/L      Total Bilirubin 0.6 mg/dL      Globulin 3.8 gm/dL      A/G Ratio 0.9 g/dL      BUN/Creatinine Ratio 27.4     Anion Gap 12.0 mmol/L      eGFR 23.6 mL/min/1.73     Narrative:      GFR Normal >60  Chronic Kidney Disease <60  Kidney Failure <15    The GFR formula is only valid for adults with stable renal function between ages 18 and 70.    Blood Culture - Blood, Arm, Right [[id number]] Collected: 10/16/24 1549    Specimen: Blood from Arm, Right Updated: 10/16/24 1609          Imaging Results (Last 24 Hours)       Procedure Component Value Units Date/Time    CT Abdomen Pelvis Without Contrast [[id number]] Collected: 10/16/24 1713     Updated: 10/16/24 1723    Narrative:      EXAMINATION:  CT ABDOMEN PELVIS WO CONTRAST-  10/16/2024 3:56 PM     HISTORY: Right hip wound.     TECHNIQUE: Spiral CT was performed of the abdomen and pelvis without  contrast. Multiplanar images were reconstructed.     DLP: 175.78 mGy.cm Automated dosage reduction technique was utilized to  reduce patient dosage.     COMPARISON: 9/28/2015.     LUNG BASES: There is atelectasis in both lung bases. There is patchy  consolidation in the lingula. There is coronary artery calcification.  There is aortic and mitral valve calcification.     LIVER AND SPLEEN: The liver is very dense with a Hounsfield unit  measurement of 116. The spleen is normal density with multiple calcified  granulomas. There has been prior cholecystectomy.     PANCREAS: Normal unenhanced appearance of the pancreas.     KIDNEYS AND ADRENALS: The adrenal glands and right kidney are  unremarkable. There is a 3.5 cm  left renal cyst with a Hounsfield  measurement of 11. The renal collecting systems are nondilated. There is  mild distention of the urinary bladder. There is air in the bladder.     BOWEL: No oral contrast was given. There is under distention of the  stomach. There is gastric wall thickening. Small bowel loops are  nondilated. There is moderate to large stool in the colon. There is  diverticulosis of the colon. There is fecal impaction of the rectum  measuring 8.9 cm transversely.     OTHER: There is atheromatous disease of the aortoiliac vessels. There  has been prior bilateral hip arthroplasty. There is narrowing and  spurring of the symphysis pubis. There are degenerative changes of the  spine. There is lumbar scoliosis convex to the left. There is skin  ulceration lateral to the right ilium. There is some air within the soft  tissues in this area. There is a phlegmon measuring about 5.6 x 3.1 cm.  Simple fluid is not seen in this area. This extends to the lateral  margin of the ilium. I do not see any iliac erosive change or  destruction to suggest osteomyelitis involving the right ilium.          Impression:      1. Skin ulceration in the right pelvic region lateral to the ileum with  a 5.6 x 3.1 cm collection that is not simple fluid. This is likely an  inflammatory mass/phlegmon. There is some air in the soft tissues in  this area. This abuts the lateral margin of the right ilium. There is no  right ilium erosion or destruction to suggest osteomyelitis.  2. Atelectasis in the lung bases. Patchy lingular consolidation may  represent atelectasis or pneumonia. Coronary artery calcification.  Aortic and mitral valve calcification.  3. The liver is very dense. Hemachromatosis is considered. Increased  liver density may also be seen with chronic amiodarone therapy.  4. A 3.5 cm left renal cyst.  5. Mild distention of the urinary bladder. There is air in the urinary  bladder. Correlate with instrumentation.  6.  Gastric wall thickening likely due to under distention. Gastritis and  other etiologies are also considered.  7. Diverticulosis of the colon. Fecal impaction of the rectum measuring  8.9 cm transversely.  8. Other nonacute findings, as discussed above.        The full report of this exam was immediately signed and available to the  emergency room. The patient is currently in the emergency room.     This report was signed and finalized on 10/16/2024 5:20 PM by Dr. Yunior Chanel MD.       XR Chest 1 View [[id number]] Collected: 10/16/24 1508     Updated: 10/16/24 1512    Narrative:      EXAM: XR CHEST 1 VW-      DATE: 10/16/2024 2:03 PM     HISTORY: Cough and congestion       COMPARISON: 8/15/2017.     TECHNIQUE:  Frontal view(s) of the chest submitted.     FINDINGS:    There are bilateral opacities which may be related to edema or  multifocal pneumonia. There is elevation of the right hemidiaphragm. No  effusion or pneumothorax is seen. There is calcification of the thoracic  aorta. There is bilateral glenohumeral joint osteoarthritis and AC joint  arthropathy.          Impression:         1. Bilateral opacities in the lungs may be due to edema or multifocal  pneumonia.     This report was signed and finalized on 10/16/2024 3:09 PM by Jose Hooper.             I have personally reviewed and interpreted the radiology studies and ECG obtained at time of admission.     Assessment / Plan   Assessment:   Active Hospital Problems    Diagnosis     **ARF (acute renal failure)     Pressure ulcer, hip, right, unstageable     Infected wound     Abnormal LFTs     Advanced age     Acute urinary retention     Pneumonia due to human metapneumovirus     Chronic constipation     Anemia        Treatment Plan  Discussed with Dr. Alegria with General Surgery  NPO at midnight and to OR tomorrow for washout of wound, and likely application of wound vac  Follow-up wound culture (right hip)  Follow-up blood cultures  Bolus with 500mL  of IVF now then maintenance fluids thereafter  Check bladder scan; anticipate she may require I/O catheterization vs. Belcher  IV Vancomycin  Check MRSA PCR  IV Zosyn  Follow-up UA - still pending  Bowel regimen for constipation  Repeat CBC, CMP, coags in AM  SCDs  Workup ongoing    Medical Decision Making  Number and Complexity of problems: 3 acute; high complexity      Conditions and Status        Condition is worsening.     MDM Data  External documents reviewed: none  Cardiac tracing (EKG, telemetry) interpretation: no new EKGs  Radiology interpretation: results of CT A/P personally reviewed by me including fluid collection right lateral hip region  Labs reviewed: as above  Any tests that were considered but not ordered: none     Decision rules/scores evaluated (example [id number], Wells, etc): none     Discussed with: patient, family at bedside, Dr. Alegria, Dr. Chapman     Care Planning  Shared decision making: Discussed with patient and family with agreement to proceed with treatment plan as outlined  Code status and discussions: Based upon discussion with family at bedside they indicate full CODE STATUS at this time    Disposition  Social Determinants of Health that impact treatment or disposition: Not apparent at this time  Estimated length of stay is greater than 2 days    I confirmed that the patient's advanced care plan is present, code status is documented, and a surrogate decision maker is listed in the patient's medical record.     The patient's surrogate decision maker is her daughter        Electronically signed by Gilbert Palmer MD, 10/16/24, 18:17 CDT.                Electronically signed by Gilbert Palmer MD at 10/16/24 1819          Emergency Department Notes        Alvarado Chauhan II, RN at 10/16/24 1850          Nursing report ED to floor  Sofía Clifton  89 y.o.  female    HPI:   Chief Complaint   Patient presents with    Wound Infection       Admitting doctor:   Gilbert DIANA  "MD Spencer    Consulting provider(s):  Consults       No orders found from 9/17/2024 to 10/17/2024.             Admitting diagnosis:   The primary encounter diagnosis was Acute renal failure, unspecified acute renal failure type. Diagnoses of Cellulitis of hip and Pneumonia due to human metapneumovirus (hMPV) were also pertinent to this visit.    Code status:   Current Code Status       Date Active Code Status Order ID Comments User Context       10/16/2024 1808 CPR (Attempt to Resuscitate) [id number]  Gilbert Palmer MD ED        Question Answer    Code Status (Patient has no pulse and is not breathing) CPR (Attempt to Resuscitate)    Medical Interventions (Patient has pulse or is breathing) Full Support                    Allergies:   Codeine, Morphine and codeine, Norflex [orphenadrine citrate], and Stadol [butorphanol]    Intake and Output    Intake/Output Summary (Last 24 hours) at 10/16/2024 1850  Last data filed at 10/16/2024 1823  Gross per 24 hour   Intake 100 ml   Output --   Net 100 ml       Weight:       10/16/24  1354   Weight: 45.4 kg (100 lb)       Most recent vitals:   Vitals:    10/16/24 1354 10/16/24 1847   BP: 95/48 110/52   BP Location: Left arm    Patient Position: Sitting    Pulse: 64 64   Resp: 18 18   Temp: 98 °F (36.7 °C)    TempSrc: Oral    SpO2: 96% 98%   Weight: 45.4 kg (100 lb)    Height: 160 cm (63\")      Oxygen Therapy: .    Active LDAs/IV Access:   Lines, Drains & Airways       Active LDAs       Name Placement date Placement time Site Days    Peripheral IV 10/16/24 1613 Right Antecubital 10/16/24  1613  Antecubital  less than 1                    Labs (abnormal labs have a star):   Labs Reviewed   RESPIRATORY PANEL PCR W/ COVID-19 (SARS-COV-2), NP SWAB IN UTM/VTP, 2 HR TAT - Abnormal; Notable for the following components:       Result Value    Human Metapneumovirus Detected (*)     All other components within normal limits    Narrative:     In the setting of a positive " respiratory panel with a viral infection PLUS a negative procalcitonin without other underlying concern for bacterial infection, consider observing off antibiotics or discontinuation of antibiotics and continue supportive care. If the respiratory panel is positive for atypical bacterial infection (Bordetella pertussis, Chlamydophila pneumoniae, or Mycoplasma pneumoniae), consider antibiotic de-escalation to target atypical bacterial infection.   COMPREHENSIVE METABOLIC PANEL - Abnormal; Notable for the following components:    Glucose 119 (*)     BUN 55 (*)     Creatinine 2.01 (*)     Sodium 135 (*)     Albumin 3.4 (*)     ALT (SGPT) 565 (*)     AST (SGOT) 331 (*)     Alkaline Phosphatase 171 (*)     BUN/Creatinine Ratio 27.4 (*)     eGFR 23.6 (*)     All other components within normal limits    Narrative:     GFR Normal >60  Chronic Kidney Disease <60  Kidney Failure <15    The GFR formula is only valid for adults with stable renal function between ages 18 and 70.   CBC WITH AUTO DIFFERENTIAL - Abnormal; Notable for the following components:    WBC 11.89 (*)     RBC 2.55 (*)     Hemoglobin 8.6 (*)     Hematocrit 26.4 (*)     .5 (*)     MCH 33.7 (*)     All other components within normal limits    Narrative:     The previously reported component NRBC is no longer being reported. Previous result was 0.0 /100 WBC (Reference Range: 0.0-0.2 /100 WBC) on 10/16/2024 at 1619 CDT.   MANUAL DIFFERENTIAL - Abnormal; Notable for the following components:    Neutrophil % 91.9 (*)     Lymphocyte % 3.0 (*)     Monocyte % 2.0 (*)     Eosinophil % 0.0 (*)     Neutrophils Absolute 11.17 (*)     Lymphocytes Absolute 0.48 (*)     All other components within normal limits   FERRITIN - Abnormal; Notable for the following components:    Ferritin 2,435.00 (*)     All other components within normal limits    Narrative:     Results may be falsely decreased if patient taking Biotin.     LACTIC ACID, PLASMA - Normal   WOUND CULTURE    BLOOD CULTURE   BLOOD CULTURE   URINALYSIS W/ CULTURE IF INDICATED   CBC AND DIFFERENTIAL    Narrative:     The following orders were created for panel order CBC & Differential.  Procedure                               Abnormality         Status                     ---------                               -----------         ------                     CBC Auto Differential[[id number]]        Abnormal            Final result                 Please view results for these tests on the individual orders.       Meds given in ED:   Medications   lactated ringers bolus 500 mL (has no administration in time range)   piperacillin-tazobactam (ZOSYN) 3.375 g IVPB in 100 mL NS MBP (CD) (0 g Intravenous Stopped 10/16/24 1823)           NIH Stroke Scale:       Isolation/Infection(s):  No active isolations   Human Metapneumovirus      COVID Testing  Collected   Resulted Negative     Nursing report ED to floor:  Mental status: A & O x 2 (person and place)   Ambulatory status: With assistance   Precautions: Fall and Contact isolation     ED nurse phone extentsion- ..      Electronically signed by Alvarado Chauhan II, RN at 10/16/24 1851       Jer Chapman DO at 10/16/24 1512          Subjective   History of Present Illness  89-year-old female who presents emergency department for a wound infection.  The patient has dementia.  She normally lives with her daughter.  Her daughter has recently gone out of town for surgical procedure with her .  The  patient is accompanied by her son and daughter-in-law.  They note that they have had some difficulty getting home health to come out.  Home health from [facility] came out today and noted that they thought the patient needed to come to the ED.  The patient went to the PCP and stated that she had a sore on her hip.  It has been present off and on for couple months.  The patient will pick on it make it worse.  She was given Omnicef, but also noted to have a urinary tract infection.   "She recently finished the Omnicef.  On the eighth she went to the PCP, and they noted that he did not do anything initially, and the second visit on follow-up he did prescribe the antibiotic at that time.  And then have had some difficulty getting home health.  The patient has no fevers or chills, but is unable to give any type of history.  They also note that she has developed cold-like symptoms with a runny nose.  On my exam she does have a congested cough.    Review of Systems   Constitutional:  Negative for chills and fever.   Skin:  Positive for wound.       Past Medical History:   Diagnosis Date    Anemia 11/18/2016    Benign essential hypertension 11/18/2016    Chronic constipation 11/18/2016    Degeneration of lumbar intervertebral disc 11/18/2016    Diverticulitis of colon 11/18/2016    Gastroesophageal reflux disease with esophagitis 11/18/2016    Hypercholesteremia 11/18/2016    Osteoarthritis 11/18/2016    Paroxysmal atrial fibrillation 11/18/2016       Allergies   Allergen Reactions    Codeine Hives    Morphine And Codeine      \"Burning inside\" per patient    Norflex [Orphenadrine Citrate] Hives    Stadol [Butorphanol] Mental Status Change and Irritability       Past Surgical History:   Procedure Laterality Date    APPENDECTOMY      BACK SURGERY      C-SPINE, LUMBAR SPINE    BLADDER SURGERY      CHOLECYSTECTOMY      ENDOSCOPY N/A 07/12/2023    Distal Esophagitis LA Grade A otherwise normal exam    ENDOSCOPY  09/29/2015    Non-bleeding erosive gastropathy    HYSTERECTOMY      JOINT REPLACEMENT Right     HIP    KNEE SURGERY Right     TOTAL HIP ARTHROPLASTY Left        Family History   Problem Relation Age of Onset    Heart disease Father     Heart attack Father     Heart disease Sister     Heart attack Sister     Heart disease Mother     Alzheimer's disease Mother     Heart disease Brother     No Known Problems Maternal Grandmother     No Known Problems Maternal Grandfather     No Known Problems Paternal " Grandmother     No Known Problems Paternal Grandfather     Heart disease Sister     Heart disease Brother     Heart disease Brother     Heart disease Brother     Heart disease Brother     No Known Problems Brother     Heart disease Brother     Heart disease Brother        Social History     Socioeconomic History    Marital status:    Tobacco Use    Smoking status: Never    Smokeless tobacco: Never   Vaping Use    Vaping status: Never Used   Substance and Sexual Activity    Alcohol use: No    Drug use: No    Sexual activity: Defer           Objective   Physical Exam  Vitals reviewed.   Constitutional:       Appearance: She is not ill-appearing.   HENT:      Head: Normocephalic and atraumatic.      Right Ear: External ear normal.      Left Ear: External ear normal.      Nose: Nose normal.   Eyes:      General: No scleral icterus.     Conjunctiva/sclera: Conjunctivae normal.   Cardiovascular:      Rate and Rhythm: Normal rate and regular rhythm.      Heart sounds: Normal heart sounds.   Pulmonary:      Effort: Pulmonary effort is normal.      Breath sounds: Normal breath sounds.   Abdominal:      General: There is no distension.      Palpations: Abdomen is soft.   Musculoskeletal:         General: No swelling or tenderness.      Cervical back: Normal range of motion and neck supple.   Skin:     General: Skin is warm and dry.      Comments: Right hip wound with discharge. Erythema. Appears to have a deep hole in the right hip.    Neurological:      Mental Status: She is alert.      Cranial Nerves: No cranial nerve deficit.      Comments: Confused    Psychiatric:         Mood and Affect: Mood normal.         Behavior: Behavior normal.      Comments: Confused. Appears to have chronic dementia.          Procedures      Lab Results (last 24 hours)       Procedure Component Value Units Date/Time    CBC & Differential [[id number]]  (Abnormal) Collected: 10/16/24 1548    Specimen: Blood Updated: 10/16/24 9124     Narrative:      The following orders were created for panel order CBC & Differential.  Procedure                               Abnormality         Status                     ---------                               -----------         ------                     CBC Auto Differential[[id number]]        Abnormal            Final result                 Please view results for these tests on the individual orders.    Comprehensive Metabolic Panel [[id number]]  (Abnormal) Collected: 10/16/24 1549    Specimen: Blood Updated: 10/16/24 1633     Glucose 119 mg/dL      BUN 55 mg/dL      Creatinine 2.01 mg/dL      Sodium 135 mmol/L      Potassium 3.8 mmol/L      Chloride 101 mmol/L      CO2 22.0 mmol/L      Calcium 8.7 mg/dL      Total Protein 7.2 g/dL      Albumin 3.4 g/dL      ALT (SGPT) 565 U/L      AST (SGOT) 331 U/L      Alkaline Phosphatase 171 U/L      Total Bilirubin 0.6 mg/dL      Globulin 3.8 gm/dL      A/G Ratio 0.9 g/dL      BUN/Creatinine Ratio 27.4     Anion Gap 12.0 mmol/L      eGFR 23.6 mL/min/1.73     Narrative:      GFR Normal >60  Chronic Kidney Disease <60  Kidney Failure <15    The GFR formula is only valid for adults with stable renal function between ages 18 and 70.    Lactic Acid, Plasma [[id number]]  (Normal) Collected: 10/16/24 1549    Specimen: Blood Updated: 10/16/24 1633     Lactate 1.6 mmol/L     Blood Culture - Blood, Arm, Right [[id number]] Collected: 10/16/24 1549    Specimen: Blood from Arm, Right Updated: 10/16/24 1609    CBC Auto Differential [[id number]]  (Abnormal) Collected: 10/16/24 1549    Specimen: Blood Updated: 10/16/24 1642     WBC 11.89 10*3/mm3      RBC 2.55 10*6/mm3      Hemoglobin 8.6 g/dL      Hematocrit 26.4 %      .5 fL      MCH 33.7 pg      MCHC 32.6 g/dL      RDW 14.0 %      RDW-SD 52.8 fl      MPV 9.6 fL      Platelets 198 10*3/mm3     Narrative:      The previously reported component NRBC is no longer being reported. Previous result was 0.0 /100 WBC (Reference Range:  0.0-0.2 /100 WBC) on 10/16/2024 at 1619 CDT.    Manual Differential [[id number]]  (Abnormal) Collected: 10/16/24 1549    Specimen: Blood Updated: 10/16/24 1642     Neutrophil % 91.9 %      Lymphocyte % 3.0 %      Monocyte % 2.0 %      Eosinophil % 0.0 %      Basophil % 0.0 %      Bands %  2.0 %      Atypical Lymphocyte % 1.0 %      Neutrophils Absolute 11.17 10*3/mm3      Lymphocytes Absolute 0.48 10*3/mm3      Monocytes Absolute 0.24 10*3/mm3      Eosinophils Absolute 0.00 10*3/mm3      Basophils Absolute 0.00 10*3/mm3      Anisocytosis Slight/1+     Elliptocytes Slight/1+     Macrocytes Slight/1+     Poikilocytes Slight/1+     Toxic Granulation Slight/1+     Platelet Morphology Normal    Ferritin [[id number]]  (Abnormal) Collected: 10/16/24 1549    Specimen: Blood Updated: 10/16/24 1735     Ferritin 2,435.00 ng/mL     Narrative:      Results may be falsely decreased if patient taking Biotin.      Respiratory Panel PCR w/COVID-19(SARS-CoV-2) [facility]/T[facility]/J[facility] In-House, NP Swab in UTM/VTM, 2 HR TAT - Swab, Nasopharynx [[id number]]  (Abnormal) Collected: 10/16/24 1553    Specimen: Swab from Nasopharynx Updated: 10/16/24 1710     ADENOVIRUS, PCR Not Detected     Coronavirus 229E Not Detected     Coronavirus HKU1 Not Detected     Coronavirus NL63 Not Detected     Coronavirus OC43 Not Detected     COVID19 Not Detected     Human Metapneumovirus Detected     Human Rhinovirus/Enterovirus Not Detected     Influenza A PCR Not Detected     Influenza B PCR Not Detected     Parainfluenza Virus 1 Not Detected     Parainfluenza Virus 2 Not Detected     Parainfluenza Virus 3 Not Detected     Parainfluenza Virus 4 Not Detected     RSV, PCR Not Detected     Bordetella pertussis pcr Not Detected     Bordetella parapertussis PCR Not Detected     Chlamydophila pneumoniae PCR Not Detected     Mycoplasma pneumo by PCR Not Detected    Narrative:      In the setting of a positive respiratory panel with a viral infection PLUS a negative  procalcitonin without other underlying concern for bacterial infection, consider observing off antibiotics or discontinuation of antibiotics and continue supportive care. If the respiratory panel is positive for atypical bacterial infection (Bordetella pertussis, Chlamydophila pneumoniae, or Mycoplasma pneumoniae), consider antibiotic de-escalation to target atypical bacterial infection.    Wound Culture - Wound, Hip, Right [[id number]] Collected: 10/16/24 1555    Specimen: Wound from Hip, Right Updated: 10/16/24 1606    Blood Culture - Blood, Arm, Left [[id number]] Collected: 10/16/24 1612    Specimen: Blood from Arm, Left Updated: 10/16/24 1649          CT Abdomen Pelvis Without Contrast   Final Result   1. Skin ulceration in the right pelvic region lateral to the ileum with   a 5.6 x 3.1 cm collection that is not simple fluid. This is likely an   inflammatory mass/phlegmon. There is some air in the soft tissues in   this area. This abuts the lateral margin of the right ilium. There is no   right ilium erosion or destruction to suggest osteomyelitis.   2. Atelectasis in the lung bases. Patchy lingular consolidation may   represent atelectasis or pneumonia. Coronary artery calcification.   Aortic and mitral valve calcification.   3. The liver is very dense. Hemachromatosis is considered. Increased   liver density may also be seen with chronic amiodarone therapy.   4. A 3.5 cm left renal cyst.   5. Mild distention of the urinary bladder. There is air in the urinary   bladder. Correlate with instrumentation.   6. Gastric wall thickening likely due to under distention. Gastritis and   other etiologies are also considered.   7. Diverticulosis of the colon. Fecal impaction of the rectum measuring   8.9 cm transversely.   8. Other nonacute findings, as discussed above.           The full report of this exam was immediately signed and available to the   emergency room. The patient is currently in the emergency room.        This report was signed and finalized on 10/16/2024 5:20 PM by Dr. Yunior Chanel MD.          XR Chest 1 View   Final Result       1. Bilateral opacities in the lungs may be due to edema or multifocal   pneumonia.       This report was signed and finalized on 10/16/2024 3:09 PM by Jose Hooper.                ED Course  ED Course as of 10/16/24 1747   Wed Oct 16, 2024   1601 Anticipate admission based on CXR. Spoke with family RE possible plan and update.  [AJ]   1743 General surgery is in the ED evaluating the patient. Dr. Aguirre is agreeable to admission.  [AJ]      ED Course User Index  [AJ] Jer Chapman DO      Wound culture. Zosyn.                                        Medical Decision Making  DDX: COVID, PNA, Cellulitis, abscess    Amount and/or Complexity of Data Reviewed  Labs: ordered.     Details: Cbc cmp lactic   Radiology: ordered.        Final diagnoses:   Acute renal failure, unspecified acute renal failure type   Cellulitis of hip   Pneumonia due to human metapneumovirus (hMPV)       ED Disposition  ED Disposition       ED Disposition   Decision to Admit    Condition   --    Comment   Level of Care: Med/Surg [1]   Diagnosis: ARF (acute renal failure) [443895]   Admitting Physician: AURORA AGUIRRE [[id number]]   Certification: I Certify That Inpatient Hospital Services Are Medically Necessary For Greater Than 2 Midnights                 No follow-up provider specified.       Medication List        New Prescriptions      CeleXA 20 MG tablet  Generic drug: citalopram     ciprofloxacin 500 MG tablet  Commonly known as: CIPRO     furosemide 40 MG tablet  Commonly known as: LASIX     hydrALAZINE 25 MG tablet  Commonly known as: APRESOLINE                 Jer Chapman DO  10/16/24 1523       Jer Chapman DO  10/16/24 1747      Electronically signed by Jer Chapman DO at 10/16/24 1747       Current Facility-Administered Medications   Medication Dose Route Frequency  Provider Last Rate Last Admin    acetaminophen (TYLENOL) tablet 650 mg  650 mg Oral Q6H PRN Gilbert Palmer MD        aluminum-magnesium hydroxide-simethicone (MAALOX MAX) 400-400-40 MG/5ML suspension 15 mL  15 mL Oral Q6H PRN Gilbert Palmer MD        sennosides-docusate (PERICOLACE) 8.6-50 MG per tablet 2 tablet  2 tablet Oral BID Gilbert Palmer MD   2 tablet at 10/16/24 2133    And    polyethylene glycol (MIRALAX) packet 17 g  17 g Oral Daily Gilbert Palmer MD   17 g at 10/16/24 2133    And    bisacodyl (DULCOLAX) EC tablet 5 mg  5 mg Oral Daily Gilbert Palmer MD        And    bisacodyl (DULCOLAX) suppository 10 mg  10 mg Rectal Daily PRN Gilbert Palmer MD        citalopram (CeleXA) tablet 20 mg  20 mg Oral Daily Gilbert Palmer MD        [Held by provider] clopidogrel (PLAVIX) tablet 75 mg  75 mg Oral Daily Gilbert Palmer MD        lactated ringers infusion  125 mL/hr Intravenous Continuous Gilbert Palmer  mL/hr at 10/16/24 2133 125 mL/hr at 10/16/24 2133    levothyroxine (SYNTHROID, LEVOTHROID) tablet 75 mcg  75 mcg Oral Q AM Gilbert Palmer MD        multivitamin with minerals 1 tablet  1 tablet Oral Daily Gilbert Palmer MD        ondansetron (ZOFRAN) injection 4 mg  4 mg Intravenous Q6H PRN Gilbert Palmer MD        Pharmacy to Dose Zosyn   Does not apply Continuous PRN Gilbert Palmer MD        piperacillin-tazobactam (ZOSYN) 3.375 g IVPB in 100 mL NS MBP (CD)  3.375 g Intravenous Q12H Gilbert Palmer MD        sodium chloride 0.9 % flush 10 mL  10 mL Intravenous Q12H Gilbert Palmer MD   10 mL at 10/16/24 2133    sodium chloride 0.9 % flush 10 mL  10 mL Intravenous PRN Gilbert Palmer MD        sodium chloride 0.9 % infusion 40 mL  40 mL Intravenous PRN Gilbert Palmer MD        vancomycin 500 mg/100 mL 0.9% NS IVPB  500 mg Intravenous Q24H Gilbert Palmer MD            Consult Notes  (last 24 hours)        Wade Alegria MD at 10/16/24 7389          Patient Care Team:  Sukhi Pelayo MD as PCP - General  PelayoSukhi MD as PCP - Family Medicine  Sukhi Pelayo MD as Referring Physician (Internal Medicine)  Xavier Hough MD as Cardiologist (Cardiology)    Chief complaint wound infection    Subjective     Subjective .     History of present illness: 89-year-old female with past medical history of dementia, hypertension, constipation who presents today due to concern of right hip wound.    Patient is cared for at home by home health who noticed wound on lateral aspect of her hip getting worse the past few weeks.  The home health provider recommended she present to the emergency department.  Patient recently was treated for UTI with 10-day course of Omnicef.    She has had decreased p.o. intake, increased fatigue and weakness, increased confusion from her baseline.    Review of Systems  Pertinent items are noted in HPI, all other systems reviewed and negative    History  Past Medical History:   Diagnosis Date    Anemia 11/18/2016    Benign essential hypertension 11/18/2016    Chronic constipation 11/18/2016    Degeneration of lumbar intervertebral disc 11/18/2016    Diverticulitis of colon 11/18/2016    Gastroesophageal reflux disease with esophagitis 11/18/2016    Hypercholesteremia 11/18/2016    Osteoarthritis 11/18/2016    Paroxysmal atrial fibrillation 11/18/2016   ,   Past Surgical History:   Procedure Laterality Date    APPENDECTOMY      BACK SURGERY      C-SPINE, LUMBAR SPINE    BLADDER SURGERY      CHOLECYSTECTOMY      ENDOSCOPY N/A 07/12/2023    Distal Esophagitis LA Grade A otherwise normal exam    ENDOSCOPY  09/29/2015    Non-bleeding erosive gastropathy    HYSTERECTOMY      JOINT REPLACEMENT Right     HIP    KNEE SURGERY Right     TOTAL HIP ARTHROPLASTY Left    ,   Family History   Problem Relation Age of Onset    Heart disease Father     Heart attack Father      Heart disease Sister     Heart attack Sister     Heart disease Mother     Alzheimer's disease Mother     Heart disease Brother     No Known Problems Maternal Grandmother     No Known Problems Maternal Grandfather     No Known Problems Paternal Grandmother     No Known Problems Paternal Grandfather     Heart disease Sister     Heart disease Brother     Heart disease Brother     Heart disease Brother     Heart disease Brother     No Known Problems Brother     Heart disease Brother     Heart disease Brother    ,   Social History     Tobacco Use    Smoking status: Never    Smokeless tobacco: Never   Vaping Use    Vaping status: Never Used   Substance Use Topics    Alcohol use: No    Drug use: No   , (Not in a hospital admission)  , Scheduled Meds:  lactated ringers, 500 mL, Intravenous, Once    , Continuous Infusions:   , PRN Meds:   and Allergies:  Codeine, Morphine and codeine, Norflex [orphenadrine citrate], and Stadol [butorphanol]    Objective      Objective     Vital Signs   Temp:  [98 °F (36.7 °C)] 98 °F (36.7 °C)  Heart Rate:  [64] 64  Resp:  [18] 18  BP: ()/(48-52) 110/52    Intake & Output (last 3 days)         10/13 0701  10/14 0700 10/14 0701  10/15 0700 10/15 0701  10/16 0700 10/16 0701  10/17 0700    IV Piggyback    100    Total Intake(mL/kg)    100 (2.2)    Net    +100                     Physical Exam:     General Appearance:  Not oriented on exam, cooperative, in no acute distress   Head:    Normocephalic, without obvious abnormality, atraumatic   Lungs:   No increased work of breathing, bilateral breath sounds    Heart:  Regular rate and rhythm   Chest Wall:    No abnormalities observed   Abdomen:   Soft, nontender, nondistended   Rectal:     Deferred   Extremities: Right hip with 3 cm x 4 cm pressure ulcer.  Wound extends deep towards the iliac crest.  Fibrinous and purulent fluid expressed.   Pulses:   Pulses palpable and equal bilaterally        Results from last 7 days   Lab Units  10/16/24  1549   WBC 10*3/mm3 11.89*   HEMOGLOBIN g/dL 8.6*   HEMATOCRIT % 26.4*   PLATELETS 10*3/mm3 198        Results from last 7 days   Lab Units 10/16/24  1549   SODIUM mmol/L 135*   POTASSIUM mmol/L 3.8   CHLORIDE mmol/L 101   CO2 mmol/L 22.0   BUN mg/dL 55*   CREATININE mg/dL 2.01*   CALCIUM mg/dL 8.7   BILIRUBIN mg/dL 0.6   ALK PHOS U/L 171*   ALT (SGPT) U/L 565*   AST (SGOT) U/L 331*   GLUCOSE mg/dL 119*         Results Review:   I reviewed the patient's new clinical results.    CT abdomen pelvis with right iliac crest pressure ulcer extending down to bone.  No evidence of osteomyelitis appreciated.    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    Right iliac crest wound bedside debrided.  No undrained fluid collection appreciated and there is minimal surrounding erythema.  I do not think this is because for her systemic symptoms however patient would benefit from formal debridement in the OR and placement of wound VAC.    Additionally patient has significant stool burden with fecal impaction.  Will plan to accomplish fecal disimpaction in the OR as well.    Plan  *OR tomorrow for wound debridement, wound VAC placement, and fecal disimpaction  *N.p.o. at midnight  *Scheduled bowel regimen for constipation at discharge  *Remainder of care per primary team      I discussed the patient's findings and my recommendations with patient, primary care team, and consulting provider    Wade Alegria MD  10/16/24  18:58 CDT    Time: Time spent with patient 35 minutes     Part of this note may be an electronic  transcription/translation of spoken language to printed text using the Dragon Dictation System.         Electronically signed by Wade Alegria MD at 10/16/24 5738      Cxr  IMPRESSION:     1. Bilateral opacities in the lungs may be due to edema or multifocal  pneumonia.    10/17 NPO since midnight. Family at bedside. IVF and Iv abx. Turn q2hrs. Safety maintained.

## 2024-10-17 NOTE — CONSULTS
[facility]  INPATIENT WOUND & OSTOMY CONSULTATION    Today's Date: 10/17/24    Patient Name: Sofía Clifton  MRN: [id number]  [facility]: [id number]  PCP: Sukhi Pealyo MD  Referring Provider:   Consulting Provider (From admission, onward)      Start Ordered     Status Ordering Provider    10/17/24 0730 10/16/24 2324  Inpatient Wound Care MD Consult  IN AM        Specialty:  Wound Care  Provider:  Jamaica Jeter APRN Acknowledged THOMPSON, BENJAMIN H           Attending Provider: Gilbert Palmer MD  Length of Stay: 1    SUBJECTIVE   Chief Complaint: ***    HPI: Sofía Clifton, a 89 y.o.female, presents with a past medical history of ***.  A full past medical history is listed below.  Inpatient wound care consulted due to ***    Patient is currently prepping for OR. Dr. Alegria is taking her for wound debridement and ***    Visit Dx:    ICD-10-CM ICD-9-CM   1. Acute renal failure, unspecified acute renal failure type  N17.9 584.9   2. Cellulitis of hip  L03.119 682.6   3. Pneumonia due to human metapneumovirus (hMPV)  J12.3 480.8   4. Pressure ulcer, hip, right, unstageable  L89.210 707.04     707.25       Hospital Problem List:     ARF (acute renal failure)    Anemia    Chronic constipation    Pressure ulcer, hip, right, unstageable    Infected wound    Abnormal LFTs    Advanced age    Acute urinary retention    Pneumonia due to human metapneumovirus      History:   Past Medical History:   Diagnosis Date    Anemia 11/18/2016    Benign essential hypertension 11/18/2016    Chronic constipation 11/18/2016    Degeneration of lumbar intervertebral disc 11/18/2016    Diverticulitis of colon 11/18/2016    Gastroesophageal reflux disease with esophagitis 11/18/2016    Hypercholesteremia 11/18/2016    Osteoarthritis 11/18/2016    Paroxysmal atrial fibrillation 11/18/2016     Past Surgical History:   Procedure Laterality Date    APPENDECTOMY      BACK SURGERY      C-SPINE, LUMBAR SPINE    BLADDER  "SURGERY      CHOLECYSTECTOMY      ENDOSCOPY N/A 07/12/2023    Distal Esophagitis LA Grade A otherwise normal exam    ENDOSCOPY  09/29/2015    Non-bleeding erosive gastropathy    HYSTERECTOMY      JOINT REPLACEMENT Right     HIP    KNEE SURGERY Right     TOTAL HIP ARTHROPLASTY Left      Social History     Socioeconomic History    Marital status:    Tobacco Use    Smoking status: Never    Smokeless tobacco: Never   Vaping Use    Vaping status: Never Used   Substance and Sexual Activity    Alcohol use: No    Drug use: No    Sexual activity: Defer     Family History   Problem Relation Age of Onset    Heart disease Father     Heart attack Father     Heart disease Sister     Heart attack Sister     Heart disease Mother     Alzheimer's disease Mother     Heart disease Brother     No Known Problems Maternal Grandmother     No Known Problems Maternal Grandfather     No Known Problems Paternal Grandmother     No Known Problems Paternal Grandfather     Heart disease Sister     Heart disease Brother     Heart disease Brother     Heart disease Brother     Heart disease Brother     No Known Problems Brother     Heart disease Brother     Heart disease Brother        Allergies:  Allergies   Allergen Reactions    Codeine Hives    Morphine And Codeine      \"Burning inside\" per patient    Norflex [Orphenadrine Citrate] Hives    Stadol [Butorphanol] Mental Status Change and Irritability       Medications:    Current Facility-Administered Medications:     acetaminophen (TYLENOL) tablet 650 mg, 650 mg, Oral, Q6H PRN, Gilbert Palmer MD    aluminum-magnesium hydroxide-simethicone (MAALOX MAX) 400-400-40 MG/5ML suspension 15 mL, 15 mL, Oral, Q6H PRN, Gilbert Palmer MD    sennosides-docusate (PERICOLACE) 8.6-50 MG per tablet 2 tablet, 2 tablet, Oral, BID, 2 tablet at 10/16/24 2133 **AND** polyethylene glycol (MIRALAX) packet 17 g, 17 g, Oral, Daily, 17 g at 10/16/24 2133 **AND** bisacodyl (DULCOLAX) EC tablet 5 mg, 5 mg, " Oral, Daily **AND** bisacodyl (DULCOLAX) suppository 10 mg, 10 mg, Rectal, Daily PRN, Gilbert Palmer MD    citalopram (CeleXA) tablet 20 mg, 20 mg, Oral, Daily, Gilbert Palmer MD    [Held by provider] clopidogrel (PLAVIX) tablet 75 mg, 75 mg, Oral, Daily, Gilbert Palmer MD    lactated ringers infusion, 125 mL/hr, Intravenous, Continuous, Gilbert Palmer MD, Last Rate: 125 mL/hr at 10/16/24 2133, 125 mL/hr at 10/16/24 2133    levothyroxine (SYNTHROID, LEVOTHROID) tablet 75 mcg, 75 mcg, Oral, Q AM, Gilbert Palmer MD    multivitamin with minerals 1 tablet, 1 tablet, Oral, Daily, Gilbert Palmer MD    ondansetron (ZOFRAN) injection 4 mg, 4 mg, Intravenous, Q6H PRN, Gilbert Palmer MD    Pharmacy to Dose Zosyn, , Does not apply, Continuous PRN, Gilbert Palmer MD    piperacillin-tazobactam (ZOSYN) 3.375 g IVPB in 100 mL NS MBP (CD), 3.375 g, Intravenous, Q12H, Gilbert Palmer MD    sodium chloride 0.9 % flush 10 mL, 10 mL, Intravenous, Q12H, Gilbert Palmer MD, 10 mL at 10/16/24 2133    sodium chloride 0.9 % flush 10 mL, 10 mL, Intravenous, PRN, Gilbert Palmer MD    sodium chloride 0.9 % infusion 40 mL, 40 mL, Intravenous, PRN, Gilbert Palmer MD    vancomycin 500 mg/100 mL 0.9% NS IVPB, 500 mg, Intravenous, Q24H, Gilbert Palmer MD    OBJECTIVE     Vitals:    10/17/24 0819   BP: 134/51   Pulse: 97   Resp: 16   Temp: 97.6 °F (36.4 °C)   SpO2: 98%       PHYSICAL EXAM: ***  Physical Exam       Results Review:  Lab Results (last 48 hours)       Procedure Component Value Units Date/Time    Wound Culture - Wound, Hip, Right [[id number]]  (Abnormal) Collected: 10/16/24 1555    Specimen: Wound from Hip, Right Updated: 10/17/24 0900     Wound Culture Light growth (2+) Gram Positive Cocci     Gram Stain Many (4+) Gram positive cocci      Many (4+) Gram negative bacilli      Many (4+) WBCs seen    CBC & Differential [[id number]]  (Abnormal) Collected:  10/17/24 0438    Specimen: Blood Updated: 10/17/24 0632    Narrative:      The following orders were created for panel order CBC & Differential.  Procedure                               Abnormality         Status                     ---------                               -----------         ------                     CBC Auto Differential[[id number]]        Abnormal            Final result                 Please view results for these tests on the individual orders.    CBC Auto Differential [[id number]]  (Abnormal) Collected: 10/17/24 0438    Specimen: Blood Updated: 10/17/24 0632     WBC 11.32 10*3/mm3      RBC 2.53 10*6/mm3      Hemoglobin 8.6 g/dL      Hematocrit 26.6 %      .1 fL      MCH 34.0 pg      MCHC 32.3 g/dL      RDW 13.9 %      RDW-SD 53.4 fl      MPV 9.8 fL      Platelets 195 10*3/mm3     Narrative:      The previously reported component NRBC is no longer being reported. Previous result was 0.0 /100 WBC (Reference Range: 0.0-0.2 /100 WBC) on 10/17/2024 at 0520 CDT.    Manual Differential [[id number]]  (Abnormal) Collected: 10/17/24 0438    Specimen: Blood Updated: 10/17/24 0632     Neutrophil % 85.9 %      Lymphocyte % 10.1 %      Monocyte % 0.0 %      Eosinophil % 0.0 %      Basophil % 0.0 %      Bands %  3.0 %      Metamyelocyte % 1.0 %      Neutrophils Absolute 10.06 10*3/mm3      Lymphocytes Absolute 1.14 10*3/mm3      Monocytes Absolute 0.00 10*3/mm3      Eosinophils Absolute 0.00 10*3/mm3      Basophils Absolute 0.00 10*3/mm3      Anisocytosis Mod/2+     Crenated RBC's Mod/2+     Elliptocytes Slight/1+     Macrocytes Slight/1+     Poikilocytes Slight/1+     Polychromasia Slight/1+     Vacuolated Neutrophils Slight/1+     Platelet Morphology Normal    Urinalysis With Culture If Indicated - Urine, Clean Catch [[id number]]  (Abnormal) Collected: 10/17/24 0606    Specimen: Urine, Clean Catch Updated: 10/17/24 0629     Color, UA Yellow     Appearance, UA Clear     pH, UA 6.5     Specific Gravity,  UA 1.011     Glucose, UA Negative     Ketones, UA Negative     Bilirubin, UA Negative     Blood, UA Negative     Protein, UA Trace     Leuk Esterase, UA Negative     Nitrite, UA Negative     Urobilinogen, UA 0.2 E.U./dL    Narrative:      In absence of clinical symptoms, the presence of pyuria, bacteria, and/or nitrites on the urinalysis result does not correlate with infection.  Urine microscopic not indicated.    MRSA Screen, PCR (Inpatient) - Swab, Nares [[id number]]  (Normal) Collected: 10/17/24 0443    Specimen: Swab from Nares Updated: 10/17/24 0611     MRSA PCR No MRSA Detected    Narrative:      The negative predictive value of this diagnostic test is high and should only be used to consider de-escalating anti-MRSA therapy. A positive result may indicate colonization with MRSA and must be correlated clinically.    Protime-INR [[id number]]  (Normal) Collected: 10/17/24 0438    Specimen: Blood Updated: 10/17/24 0533     Protime 14.0 Seconds      INR 1.04    Comprehensive Metabolic Panel [[id number]]  (Abnormal) Collected: 10/17/24 0438    Specimen: Blood Updated: 10/17/24 0532     Glucose 92 mg/dL      BUN 45 mg/dL      Creatinine 1.72 mg/dL      Sodium 136 mmol/L      Potassium 3.5 mmol/L      Chloride 102 mmol/L      CO2 23.0 mmol/L      Calcium 8.2 mg/dL      Total Protein 6.5 g/dL      Albumin 3.1 g/dL      ALT (SGPT) 445 U/L      AST (SGOT) 243 U/L      Alkaline Phosphatase 149 U/L      Total Bilirubin 0.6 mg/dL      Globulin 3.4 gm/dL      A/G Ratio 0.9 g/dL      BUN/Creatinine Ratio 26.2     Anion Gap 11.0 mmol/L      eGFR 28.5 mL/min/1.73     Narrative:      GFR Normal >60  Chronic Kidney Disease <60  Kidney Failure <15    The GFR formula is only valid for adults with stable renal function between ages 18 and 70.    Vitamin B12 [[id number]] Collected: 10/17/24 0438    Specimen: Blood Updated: 10/17/24 0502    Folate [[id number]] Collected: 10/17/24 0438    Specimen: Blood Updated: 10/17/24 0502    Ferritin  [[id number]]  (Abnormal) Collected: 10/16/24 1549    Specimen: Blood Updated: 10/16/24 1735     Ferritin 2,435.00 ng/mL     Narrative:      Results may be falsely decreased if patient taking Biotin.      Respiratory Panel PCR w/COVID-19(SARS-CoV-2) [facility] In-House, NP Swab in UTM/VTM, 2 HR TAT - Swab, Nasopharynx [[id number]]  (Abnormal) Collected: 10/16/24 1553    Specimen: Swab from Nasopharynx Updated: 10/16/24 1710     ADENOVIRUS, PCR Not Detected     Coronavirus 229E Not Detected     Coronavirus HKU1 Not Detected     Coronavirus NL63 Not Detected     Coronavirus OC43 Not Detected     COVID19 Not Detected     Human Metapneumovirus Detected     Human Rhinovirus/Enterovirus Not Detected     Influenza A PCR Not Detected     Influenza B PCR Not Detected     Parainfluenza Virus 1 Not Detected     Parainfluenza Virus 2 Not Detected     Parainfluenza Virus 3 Not Detected     Parainfluenza Virus 4 Not Detected     RSV, PCR Not Detected     Bordetella pertussis pcr Not Detected     Bordetella parapertussis PCR Not Detected     Chlamydophila pneumoniae PCR Not Detected     Mycoplasma pneumo by PCR Not Detected    Narrative:      In the setting of a positive respiratory panel with a viral infection PLUS a negative procalcitonin without other underlying concern for bacterial infection, consider observing off antibiotics or discontinuation of antibiotics and continue supportive care. If the respiratory panel is positive for atypical bacterial infection (Bordetella pertussis, Chlamydophila pneumoniae, or Mycoplasma pneumoniae), consider antibiotic de-escalation to target atypical bacterial infection.    Blood Culture - Blood, Arm, Left [[id number]] Collected: 10/16/24 1612    Specimen: Blood from Arm, Left Updated: 10/16/24 1649    CBC & Differential [[id number]]  (Abnormal) Collected: 10/16/24 1549    Specimen: Blood Updated: 10/16/24 1642    Narrative:      The following orders were created for panel order CBC  & Differential.  Procedure                               Abnormality         Status                     ---------                               -----------         ------                     CBC Auto Differential[[id number]]        Abnormal            Final result                 Please view results for these tests on the individual orders.    CBC Auto Differential [[id number]]  (Abnormal) Collected: 10/16/24 1549    Specimen: Blood Updated: 10/16/24 1642     WBC 11.89 10*3/mm3      RBC 2.55 10*6/mm3      Hemoglobin 8.6 g/dL      Hematocrit 26.4 %      .5 fL      MCH 33.7 pg      MCHC 32.6 g/dL      RDW 14.0 %      RDW-SD 52.8 fl      MPV 9.6 fL      Platelets 198 10*3/mm3     Narrative:      The previously reported component NRBC is no longer being reported. Previous result was 0.0 /100 WBC (Reference Range: 0.0-0.2 /100 WBC) on 10/16/2024 at 1619 CDT.    Manual Differential [[id number]]  (Abnormal) Collected: 10/16/24 1549    Specimen: Blood Updated: 10/16/24 1642     Neutrophil % 91.9 %      Lymphocyte % 3.0 %      Monocyte % 2.0 %      Eosinophil % 0.0 %      Basophil % 0.0 %      Bands %  2.0 %      Atypical Lymphocyte % 1.0 %      Neutrophils Absolute 11.17 10*3/mm3      Lymphocytes Absolute 0.48 10*3/mm3      Monocytes Absolute 0.24 10*3/mm3      Eosinophils Absolute 0.00 10*3/mm3      Basophils Absolute 0.00 10*3/mm3      Anisocytosis Slight/1+     Elliptocytes Slight/1+     Macrocytes Slight/1+     Poikilocytes Slight/1+     Toxic Granulation Slight/1+     Platelet Morphology Normal    Lactic Acid, Plasma [[id number]]  (Normal) Collected: 10/16/24 1549    Specimen: Blood Updated: 10/16/24 1633     Lactate 1.6 mmol/L     Comprehensive Metabolic Panel [[id number]]  (Abnormal) Collected: 10/16/24 1549    Specimen: Blood Updated: 10/16/24 1633     Glucose 119 mg/dL      BUN 55 mg/dL      Creatinine 2.01 mg/dL      Sodium 135 mmol/L      Potassium 3.8 mmol/L      Chloride 101 mmol/L      CO2 22.0 mmol/L       Calcium 8.7 mg/dL      Total Protein 7.2 g/dL      Albumin 3.4 g/dL      ALT (SGPT) 565 U/L      AST (SGOT) 331 U/L      Alkaline Phosphatase 171 U/L      Total Bilirubin 0.6 mg/dL      Globulin 3.8 gm/dL      A/G Ratio 0.9 g/dL      BUN/Creatinine Ratio 27.4     Anion Gap 12.0 mmol/L      eGFR 23.6 mL/min/1.73     Narrative:      GFR Normal >60  Chronic Kidney Disease <60  Kidney Failure <15    The GFR formula is only valid for adults with stable renal function between ages 18 and 70.    Blood Culture - Blood, Arm, Right [[id number]] Collected: 10/16/24 1549    Specimen: Blood from Arm, Right Updated: 10/16/24 1609          Imaging Results (Last 72 Hours)       Procedure Component Value Units Date/Time    CT Abdomen Pelvis Without Contrast [[id number]] Collected: 10/16/24 1713     Updated: 10/16/24 1723    Narrative:      EXAMINATION:  CT ABDOMEN PELVIS WO CONTRAST-  10/16/2024 3:56 PM     HISTORY: Right hip wound.     TECHNIQUE: Spiral CT was performed of the abdomen and pelvis without  contrast. Multiplanar images were reconstructed.     DLP: 175.78 mGy.cm Automated dosage reduction technique was utilized to  reduce patient dosage.     COMPARISON: 9/28/2015.     LUNG BASES: There is atelectasis in both lung bases. There is patchy  consolidation in the lingula. There is coronary artery calcification.  There is aortic and mitral valve calcification.     LIVER AND SPLEEN: The liver is very dense with a Hounsfield unit  measurement of 116. The spleen is normal density with multiple calcified  granulomas. There has been prior cholecystectomy.     PANCREAS: Normal unenhanced appearance of the pancreas.     KIDNEYS AND ADRENALS: The adrenal glands and right kidney are  unremarkable. There is a 3.5 cm left renal cyst with a Hounsfield  measurement of 11. The renal collecting systems are nondilated. There is  mild distention of the urinary bladder. There is air in the bladder.     BOWEL: No oral contrast was given. There is  under distention of the  stomach. There is gastric wall thickening. Small bowel loops are  nondilated. There is moderate to large stool in the colon. There is  diverticulosis of the colon. There is fecal impaction of the rectum  measuring 8.9 cm transversely.     OTHER: There is atheromatous disease of the aortoiliac vessels. There  has been prior bilateral hip arthroplasty. There is narrowing and  spurring of the symphysis pubis. There are degenerative changes of the  spine. There is lumbar scoliosis convex to the left. There is skin  ulceration lateral to the right ilium. There is some air within the soft  tissues in this area. There is a phlegmon measuring about 5.6 x 3.1 cm.  Simple fluid is not seen in this area. This extends to the lateral  margin of the ilium. I do not see any iliac erosive change or  destruction to suggest osteomyelitis involving the right ilium.          Impression:      1. Skin ulceration in the right pelvic region lateral to the ileum with  a 5.6 x 3.1 cm collection that is not simple fluid. This is likely an  inflammatory mass/phlegmon. There is some air in the soft tissues in  this area. This abuts the lateral margin of the right ilium. There is no  right ilium erosion or destruction to suggest osteomyelitis.  2. Atelectasis in the lung bases. Patchy lingular consolidation may  represent atelectasis or pneumonia. Coronary artery calcification.  Aortic and mitral valve calcification.  3. The liver is very dense. Hemachromatosis is considered. Increased  liver density may also be seen with chronic amiodarone therapy.  4. A 3.5 cm left renal cyst.  5. Mild distention of the urinary bladder. There is air in the urinary  bladder. Correlate with instrumentation.  6. Gastric wall thickening likely due to under distention. Gastritis and  other etiologies are also considered.  7. Diverticulosis of the colon. Fecal impaction of the rectum measuring  8.9 cm transversely.  8. Other nonacute  findings, as discussed above.        The full report of this exam was immediately signed and available to the  emergency room. The patient is currently in the emergency room.     This report was signed and finalized on 10/16/2024 5:20 PM by Dr. Yunior Chanel MD.       XR Chest 1 View [[id number]] Collected: 10/16/24 1508     Updated: 10/16/24 1512    Narrative:      EXAM: XR CHEST 1 VW-      DATE: 10/16/2024 2:03 PM     HISTORY: Cough and congestion       COMPARISON: 8/15/2017.     TECHNIQUE:  Frontal view(s) of the chest submitted.     FINDINGS:    There are bilateral opacities which may be related to edema or  multifocal pneumonia. There is elevation of the right hemidiaphragm. No  effusion or pneumothorax is seen. There is calcification of the thoracic  aorta. There is bilateral glenohumeral joint osteoarthritis and AC joint  arthropathy.          Impression:         1. Bilateral opacities in the lungs may be due to edema or multifocal  pneumonia.     This report was signed and finalized on 10/16/2024 3:09 PM by Jose Hooper.                  ASSESSMENT/PLAN       Examination and evaluation of wound(s) was performed.    DIAGNOSIS:   Pressure injury of iliac crest, stage 4    ARF (acute renal failure)    Anemia    Chronic constipation    Pressure ulcer, hip, right, unstageable    Infected wound    Abnormal LFTs    Advanced age    Acute urinary retention    Pneumonia due to human metapneumovirus        PLAN:   Orders placed for wound care and pressure/moisture management as listed below.       Start     Ordered    10/17/24 0953  Specialty Bed SkinGuard Float AMADO/AP Mattress  Once        Comments: For Dolphin FIS Mattress, call [facility] to order a Rowdy bed frame.  If bariatric/bariatric dolphin, [employer] provides frame, mattress, pump, and trapeze (if needed).  Nurse or HUC is to call [employer], the rental company, to order the equipment, provide patient's name, room number, and if in isolation. [phone].    Question:  Specialty Bed needed  Answer:  SkinGuard Float AMADO/AP Mattress    10/17/24 0952    10/17/24 0953  Do NOT Turn Patient On To Wound Area  Continuous        Comments: Avoid turning patient to right side on wound.    10/17/24 0952    10/17/24 0952  Turn Patient  Now Then Every 2 Hours         10/17/24 0952    10/17/24 0952  Head of Bed 30 Degrees or Less (Unless Contraindicated)  Until Discontinued         10/17/24 0952    10/17/24 0952  Elevate Heels Off of Bed  Until Discontinued         10/17/24 0952    10/17/24 0952  Use Seat Cushion When Up In Chair  Continuous         10/17/24 0952    10/17/24 0952  Silicone Border Dressing to Bony Prominences  Every Shift      Comments: Apply silicone border foam dressing per protocol to sacral spine/bilateral heels for protection.  Nursing to change dressing every 3 days and PRN if soiled. Nursing is to peel back dressing with every assessment to assess skin underneath dressing. No barrier cream under dressing.    10/17/24 0952    10/17/24 0952  Do NOT Rub or Massage Any Bony Prominence  Continuous         10/17/24 0952    10/17/24 0952  Use Repositioning Wedge to Position Patient  Continuous        Comments: Use Comfort Glide repositioning sheet and wedges to position patient.    10/17/24 0952    Unscheduled  Wound Care  As Needed      Question:  Wound Care Instructions  Answer:  Apply Moisture Barrier After Any Incontinence    10/17/24 0952             Discussed findings and treatment plan including risks, benefits, and treatment options with patient and patient's family in detail. Patient agreed with treatment plan.      This document has been electronically signed by FANNY Cifuentes on 10/17/2024 09:50 CDT     0952    10/17/24 0952  Elevate Heels Off of Bed  Until Discontinued         10/17/24 0952    10/17/24 0952  Use Seat Cushion When Up In Chair  Continuous         10/17/24 0952    10/17/24 0952  Silicone Border Dressing to Bony Prominences  Every Shift      Comments: Apply silicone border foam dressing per protocol to sacral spine/bilateral heels for protection.  Nursing to change dressing every 3 days and PRN if soiled. Nursing is to peel back dressing with every assessment to assess skin underneath dressing. No barrier cream under dressing.    10/17/24 0952    10/17/24 0952  Do NOT Rub or Massage Any Bony Prominence  Continuous         10/17/24 0952    10/17/24 0952  Use Repositioning Wedge to Position Patient  Continuous        Comments: Use Comfort Glide repositioning sheet and wedges to position patient.    10/17/24 0952    Unscheduled  Wound Care  As Needed      Question:  Wound Care Instructions  Answer:  Apply Moisture Barrier After Any Incontinence    10/17/24 0952             Discussed findings and treatment plan including risks, benefits, and treatment options with patient and patient's family in detail. Patient agreed with treatment plan.      This document has been electronically signed by FANNY Cifuentes on 10/17/2024 09:50 CDT

## 2024-10-17 NOTE — CASE MANAGEMENT/SOCIAL WORK
Discharge Planning Assessment  [facility]     Patient Name: Sofía Clifton  MRN: [id number]  Today's Date: 10/17/2024    Admit Date: 10/16/2024        Discharge Needs Assessment       Elizabeth Name 10/17/24 0841       Living Environment    People in Home child(ariadna), adult    Name(s) of People in Home Meeta Haile (daughter)  [phone]    Current Living Arrangements home    Potentially Unsafe Housing Conditions none    In the past 12 months has the electric, gas, oil, or water company threatened to shut off services in your home? No    Primary Care Provided by other (see comments)  daughter    Provides Primary Care For no one, unable/limited ability to care for self    Family Caregiver if Needed child(ariadna), adult    Family Caregiver Names Meeta Haile  [phone]  /    Edwar Clifton (Son)  [phone]    Quality of Family Relationships helpful;involved;supportive    Able to Return to Prior Arrangements --  unknown       Resource/Environmental Concerns    Resource/Environmental Concerns none    Transportation Concerns none       Transportation Needs    In the past 12 months, has lack of transportation kept you from medical appointments or from getting medications? no    In the past 12 months, has lack of transportation kept you from meetings, work, or from getting things needed for daily living? No       Food Insecurity    Within the past 12 months, you worried that your food would run out before you got the money to buy more. Never true    Within the past 12 months, the food you bought just didn't last and you didn't have money to get more. Never true       Transition Planning    Patient/Family Anticipates Transition to home    Patient/Family Anticipated Services at Transition none    Transportation Anticipated family or friend will provide       Discharge Needs Assessment    Equipment Currently Used at Home wheelchair, motorized;commode;bath bench    Concerns to be Addressed discharge  planning    Do you want help finding or keeping work or a job? I do not need or want help    Do you want help with school or training? For example, starting or completing job training or getting a high school diploma, GED or equivalent No    Equipment Needed After Discharge none    Discharge Coordination/Progress Has been living with daughter / primary caretaker. Non-ambulatory, uses wheelchair.  Has dementia. Other DME is listed above. Has established PCP and [facility]. Patient will have hip wound debrided today and wound vac placed.  CM/SS will assist with any needs related to wound vac and any needed HH or placement.                   Discharge Plan    No documentation.                 Continued Care and Services - Admitted Since 10/16/2024    No active coordination exists for this encounter.          Demographic Summary    No documentation.                  Functional Status    No documentation.                  Psychosocial    No documentation.                  Abuse/Neglect    No documentation.                  Legal    No documentation.                  Substance Abuse    No documentation.                  Patient Forms    No documentation.                     Cyndee Doe RN

## 2024-10-17 NOTE — PROGRESS NOTES
LOS: 1 day   Patient Care Team:  Sukhi Pelayo MD as PCP - General  Sukhi Pelayo MD as PCP - Family Medicine  Sukhi Pelayo MD as Referring Physician (Internal Medicine)  Xavier Hough MD as Cardiologist (Cardiology)    Subjective  No acute events overnight.  Patient with small bowel movement this morning.  N.p.o. for OR today.    Objective:   Vital Signs  Temp:  [97.3 °F (36.3 °C)-98.7 °F (37.1 °C)] 97.6 °F (36.4 °C)  Heart Rate:  [55-97] 65  Resp:  [14-19] 16  BP: (110-148)/(37-83) 142/60    Intake & Output (last 3 days)         10/14 0701  10/15 0700 10/15 0701  10/16 0700 10/16 0701  10/17 0700 10/17 0701  10/18 0700    P.O.    0    I.V. (mL/kg)    150 (3.1)    IV Piggyback   100 500    Total Intake(mL/kg)   100 (2.1) 650 (13.3)    Urine (mL/kg/hr)   1050     Total Output   1050     Net   -950 +650            Urine Unmeasured Occurrence   1 x             Physical Exam:     General Appearance:  Elderly, frail, not cooperative, evidence of dementia in no acute distress   HEENT:   Normocephalic, atraumatic, EOMI, MMM   Lungs:     Equal chest rise bilaterally.  No increased work of breathing    Heart:    Regular rhythm and normal rate   Abdomen:  Soft, nontender, nondistended   Extremities:     Moves all extremities spontaneously, no peripheral edema, right hip with soft tissue pressure ulcer, grade 4 extending to bone.   Results Review:     I reviewed the patient's new clinical results. Significant labs:   I reviewed the patient's new imaging results and agree with the interpretation.    Results from last 7 days   Lab Units 10/17/24  0438 10/16/24  1549   WBC 10*3/mm3 11.32* 11.89*   HEMOGLOBIN g/dL 8.6* 8.6*   HEMATOCRIT % 26.6* 26.4*   PLATELETS 10*3/mm3 195 198        Results from last 7 days   Lab Units 10/17/24  0438 10/16/24  1549   SODIUM mmol/L 136 135*   POTASSIUM mmol/L 3.5 3.8   CHLORIDE mmol/L 102 101   CO2 mmol/L 23.0 22.0   BUN mg/dL 45* 55*   CREATININE mg/dL 1.72* 2.01*    CALCIUM mg/dL 8.2* 8.7   BILIRUBIN mg/dL 0.6 0.6   ALK PHOS U/L 149* 171*   ALT (SGPT) U/L 445* 565*   AST (SGOT) U/L 243* 331*   GLUCOSE mg/dL 92 119*       Assessment and plan:    Assessment & Plan       ARF (acute renal failure)    Anemia    Chronic constipation    Pressure ulcer, hip, right, unstageable    Infected wound    Abnormal LFTs    Advanced age    Acute urinary retention    Pneumonia due to human metapneumovirus    Fecal impaction    89-year-old female with past medical history dementia, hypertension, chronic constipation who presents today with 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  Now status post OR for debridement, wound VAC placement, and fecal disimpaction.    Wound extends down to bone.  Grade 4 pressure ulcer.  Approximately 7 cm x 8 cm.  Fecal disimpaction with significant amount of output.      Plan  *Case management order placed for outpatient wound VAC therapy  *Agree with wound care consult.  Next wound VAC change Friday/Saturday.  I will help if still in the hospital.  *Okay for diet and PT OT from surgical perspective.  *Scheduled bowel regimen for constipation in hospital and at discharge  *Remainder of care per primary team  *Okay for discharge from surgical perspective if home wound VAC therapy/SNF placement set up.      Wade Alegria MD  10/17/24  13:59 CDT    Part of this note may be an electronic transcription/translation of spoken language to printed text using the Dragon Dictation System.

## 2024-10-17 NOTE — ANESTHESIA PREPROCEDURE EVALUATION
Anesthesia Evaluation     Patient summary reviewed and Nursing notes reviewed   no history of anesthetic complications:   NPO Solid Status: > 6 hours             Airway   Dental      Pulmonary    Cardiovascular   Exercise tolerance: poor (<4 METS)    (+) hypertension, dysrhythmias Atrial Fib, PVD      Neuro/Psych  GI/Hepatic/Renal/Endo    (+) GI bleeding , renal disease- CRI, thyroid problem hypothyroidism    Musculoskeletal     Abdominal    Substance History      OB/GYN          Other   blood dyscrasia anemia,                       Anesthesia Plan    ASA 4 - emergent     general     (Frail; isolation-->direct to OR )  intravenous induction           CODE STATUS:    Code Status (Patient has no pulse and is not breathing): CPR (Attempt to Resuscitate)  Medical Interventions (Patient has pulse or is breathing): Full Support

## 2024-10-17 NOTE — OP NOTE
DEBRIDEMENT WOUND  Procedure Note    Sofía Clifton  10/17/2024    Pre-op Diagnosis:   Pressure ulcer, hip, right, unstageable [L89.210]    Post-op Diagnosis:     Post-Op Diagnosis Codes:     * Pressure ulcer, hip, right, unstageable [L89.210]    Procedure/CPT® Codes:      Procedure(s):  DEBRIDEMENT OF RIGHT HIP WOUND, WOUND VAC PLACEMENT, FECAL DISIMPACTION    Surgeon(s):  Wade Alegria MD        Anesthesia: Choice    Staff:       Estimated Blood Loss: minimal    Specimens:                ID Type Source Tests Collected by Time   1 : ANAEROBIC AND AEROBIC CULTURE OF RIGHT HIP WOUND Surgical Site Hip, Right ANAEROBIC CULTURE, WOUND CULTURE Wade Alegria MD 10/17/2024 1045         Drains:   External Urinary Catheter (Active)   Daily Indications Daily output 10/17/24 1215   Site Assessment Clean;Skin intact 10/17/24 1215   Output (mL) 300 mL 10/17/24 0628       Indications: 89-year-old female with right iliac crest pressure ulcer    Findings: Right iliac crest pressure ulcer approximately 7 cm x 8 cm.  Bone palpated at the base of the wound.  Small amount of fascia overlying bone.  No evidence of necrosis into the bone.  Wound VAC placed.  Fecal disimpaction completed.    Complications: None    Procedure: Prior to the operation the patient was evaluated in the family was explained risk benefits and alternatives of the procedure.  The family stated their understanding and consent was signed.  The patient was then met by the anesthesia care team and taken to the operating room where she was placed right side up on the operating table after undergoing general endotracheal anesthesia.  She had proper padding of all extremities.  She was prepped and draped in usual sterile fashion.  A timeout was performed to confirm correct patient, procedure, location.    The wound was inspected and noted to be approximately 7 cm x 8 cm x 4 cm.  She was noted to have a large area of phlegmon with necrotic tissue underlying.  This tissue  was resected and necrotic tissue was debrided.  This was completed with a combination of electrocautery and sharp dissection.  The surrounding questionable skin was debrided.  The depth of the wound was approximately 4 cm.  Irrigation was then used to washout the wound.  Hemostasis was obtained.  A wound VAC was put in place.    The patient was then rolled and fecal disimpaction was completed.  A red rubber was placed within her rectum with significant amount of output.  The patient was cleaned and rotated back.  Sponge needle instrument counts were correct at the completion of the case.  Patient tolerated procedure well and was awoken from general anesthesia.  She recovered in the PACU without any complications and was transferred to the floor.      Wade Alegria MD     Date: 10/17/2024  Time: 14:29 CDT    Part of this note may be an electronic transcription/translation of spoken language to printed text using the Dragon Dictation System.

## 2024-10-17 NOTE — PLAN OF CARE
Problem: Adult Inpatient Plan of Care  Goal: Plan of Care Review  Outcome: Progressing  Flowsheets (Taken 10/17/2024 0316)  Progress: no change  Outcome Evaluation: Pt sleeping between care. NPO since midnight. Family at bedside. IVF and Iv abx. Turn q2hrs. Safety maintained.  Plan of Care Reviewed With: patient

## 2024-10-17 NOTE — CONSULTS
Nutrition PES/Intervention Note  Malnutrition Assessment    Patient Name:  Sofía Clifton  YOB: 1935  MRN: [id number]  Admit Date:  10/16/2024    Date:  10/17/2024    Comments:  Initial nutrition assessment secondary to MSA consult and wound care provider consult. Pt is s/p R hip wound washout today and wound vac placement. She had been NPO since admission. Her son reports that her appetite comes and goes. She has a dx of dementia and is disoriented. Her son reports she has lost likely over 100# over the last several years. Limited current weights, she has lost 28# in ~1 year. Son says they have attempted to have her drink nutrition supplements but she refuses. Will try magic cup supplements with lunch and dinner. She was also admitted with a fecal impaction. Advised son of alternate food selections if needed. Will follow.     Malnutrition Severity Assessment      Patient meets criteria for : Moderate (non-severe) Malnutrition (non-healing wounds, increased confusion, weakness/fatigue)  Malnutrition Type (Last 8 Hours)       Malnutrition Severity Assessment       Row Name 10/17/24 1556       Malnutrition Severity Assessment    Malnutrition Type Chronic Disease - Related Malnutrition      Row Name 10/17/24 1553       Insufficient Energy Intake     Insufficient Energy Intake Findings Moderate    Insufficient Energy Intake  <75% of est. energy requirement for > or equal to 1 month      Row Name 10/17/24 1559       Unintentional Weight Loss     Unintentional Weight Loss Findings Moderate    Unintentional Weight Loss  Weight loss of 20% in one year      Row Name 10/17/24 1557       Muscle Loss    Loss of Muscle Mass Findings Moderate    Ho[place]aoism Region Moderate - slight depression    Clavicle Bone Region Moderate - some protrusion in females, visible in males    Acromion Bone Region Moderate - acromion may slightly protrude    Scapular Bone Region Moderate - mild depression, bones may show slightly    Dorsal  Hand Region Moderate - slight depression    Patellar Region Moderate - patella more prominent, less muscle definition around patella    Posterior Calf Region Moderate - some roundness, slight firmness      Row Name 10/17/24 3840       Fat Loss    Subcutaneous Fat Loss Findings Moderate    Orbital Region  Moderate -  somewhat hollowness, slightly dark circles    Upper Arm Region Moderate - some fat tissue, not ample      Row Name 10/17/24 8866       Declining Functional Status    Declining Functional Status Findings Measurably Reduced      Row Name 10/17/24 3209       Criteria Met (Must meet criteria for severity in at least 2 of these categories: M Wasting, Fat Loss, Fluid, Secondary Signs, Wt. Status, Intake)    Patient meets criteria for  Moderate (non-severe) Malnutrition  non-healing wounds, increased confusion, weakness/fatigue                           Problem 1       Row Name 10/17/24 6189          Nutrition Diagnoses Problem 1    Problem 1 Malnutrition  in the context of chronic disease     Etiology (related to) Factors Affecting Nutrition;Medical Diagnosis     Nutrition related Increased nutrition needs     Gastrointestinal Constipation     Neurological Dementia     Skin Non healing wound;Surgical wound     Appetite Fair  appetite comes and goe     Mental State/Condition Confusion     Signs/Symptoms (evidenced by) Report of Mnimal PO Intake;Unintended Weight Change;Report/Observation     Unintended Weight Change Loss     Number of Pounds Lost 28#     Weight loss time period ~1 year     Other Comment muscle and fat wasting                            Intervention Goal       Row Name 10/17/24 0080          Intervention Goal    General Reduce/improve symptoms;Meet nutritional needs for age/condition;Disease management/therapy     PO Establish PO;Tolerate PO;Increase intake;Meet estimated needs     Weight Maintain weight                    Nutrition Intervention       Row Name 10/17/24 6607          Nutrition  Intervention    RD/Tech Action Follow Tx progress;Care plan reviewd;Encourage intake;Advise alternate selection;Recommend/ordered     Recommended/Ordered Supplement                    Nutrition Prescription       Row Name 10/17/24 1556          Nutrition Prescription PO    PO Prescription Begin/change supplement     Supplement Magic Cup     Supplement Frequency 2 times a day     New PO Prescription Ordered? Yes                    Education/Evaluation       Row Name 10/17/24 1557          Education    Education No discharge needs identified at this time        Monitor/Evaluation    Monitor Per protocol                     Electronically signed by:  Ada Barahona RDN, LOLA  10/17/24 20:08 CDT

## 2024-10-17 NOTE — ANESTHESIA POSTPROCEDURE EVALUATION
"Patient: Sofía Clifton    Procedure Summary       Date: 10/17/24 Room / Location:  [facility] OR 03 /  [facility]    Anesthesia Start: 1019 Anesthesia Stop: 1137    Procedure: DEBRIDEMENT OF RIGHT HIP WOUND, WOUND VAC PLACEMENT, FECAL DISIMPACTION (Right) Diagnosis:       Pressure ulcer, hip, right, unstageable      (Pressure ulcer, hip, right, unstageable [L89.210])    Surgeons: Wade Alegria MD Provider: ARIC Ball CRNA    Anesthesia Type: general ASA Status: 4 - Emergent            Anesthesia Type: general    Vitals  Vitals Value Taken Time   /60 10/17/24 1212   Temp 97.6 °F (36.4 °C) 10/17/24 1212   Pulse 65 10/17/24 1212   Resp 16 10/17/24 1212   SpO2 99 % 10/17/24 1212           Post Anesthesia Care and Evaluation    Patient location during evaluation: PACU  Patient participation: complete - patient participated  Level of consciousness: awake and awake and alert  Pain score: 0  Pain management: adequate    Airway patency: patent  Anesthetic complications: No anesthetic complications  PONV Status: none  Cardiovascular status: acceptable  Respiratory status: acceptable  Hydration status: acceptable    Comments: Patient discharged according to acceptable Esperanza score per RN assessment. See nursing records for further information.     Blood pressure 137/60, pulse 65, temperature 97.6 °F (36.4 °C), temperature source Oral, resp. rate 16, height 160 cm (62.99\"), weight 48.7 kg (107 lb 5.8 oz), SpO2 99%.      "

## 2024-10-17 NOTE — CASE MANAGEMENT/SOCIAL WORK
Continued Stay Note   [place]     Patient Name: Sofía Clifton  MRN: [id number]  Today's Date: 10/17/2024    Admit Date: 10/16/2024    Plan: Home with [facility] and woundvac   Discharge Plan       Row Name 10/17/24 1531       Plan    Plan Home with [facility] and woundvac    Patient/Family in Agreement with Plan yes    Plan Comments Woundvac order form placed on patient's paper chart for physician's signature to complete home woundvac order.  Online/electronic portion of order completed.  Will fax in records and order form when signed by physician.  SW spoke with patient's son at bedside who advised they do plan to take patient home at discharge.  Patient was seen by [facility] on day of admission when they sent patient to [facility] ER.  Family would like [facility] to resume at discharge.                   Discharge Codes    No documentation.                       NIYAH KrugerW

## 2024-10-17 NOTE — PROGRESS NOTES
[facility] Medicine Services  INPATIENT PROGRESS NOTE    Patient Name: Sofía Clifton  Date of Admission: 10/16/2024  Today's Date: 10/17/24  Length of Stay: 1  Primary Care Physician: Sukhi Pelayo MD    Subjective   Chief Complaint: follow-up right hip (iliac crest) wound  HPI   Patient resting comfortably this morning.  Family at bedside.  Patient is getting ready to go to the OR for wound washout and application of wound VAC by Dr. Alegria.  Patient denies any current pain.  Family reports that she has been sleeping most of the morning.  She denies feeling any shortness of breath.  She does report having a cough.  She is not requiring any supplemental oxygen.    Review of Systems   All pertinent negatives and positives are as above. All other systems have been reviewed and are negative unless otherwise stated.     Objective    Temp:  [97.6 °F (36.4 °C)-98.7 °F (37.1 °C)] 97.6 °F (36.4 °C)  Heart Rate:  [55-97] 97  Resp:  [16-18] 16  BP: ()/(48-74) 134/51  Physical Exam  Vitals reviewed.   Constitutional:       General: She is not in acute distress.     Appearance: She is ill-appearing.   HENT:      Head: Normocephalic.      Mouth/Throat:      Mouth: Mucous membranes are moist.   Eyes:      Pupils: Pupils are equal, round, and reactive to light.   Cardiovascular:      Rate and Rhythm: Normal rate.   Pulmonary:      Effort: No respiratory distress.      Breath sounds: Rhonchi present.      Comments: On room air  Musculoskeletal:      Right lower leg: No edema.      Left lower leg: No edema.   Skin:     Comments: Unchanged wound right lateral hip/iliac crest - getting ready to go to OR now   Neurological:      Mental Status: She is alert. Mental status is at baseline.      Motor: Weakness present.       Results Review:  I have reviewed the labs, radiology results, and diagnostic studies.    Laboratory Data:   Results from last 7 days   Lab Units 10/17/24  9876  10/16/24  1549   WBC 10*3/mm3 11.32* 11.89*   HEMOGLOBIN g/dL 8.6* 8.6*   HEMATOCRIT % 26.6* 26.4*   PLATELETS 10*3/mm3 195 198        Results from last 7 days   Lab Units 10/17/24  0438 10/16/24  1549   SODIUM mmol/L 136 135*   POTASSIUM mmol/L 3.5 3.8   CHLORIDE mmol/L 102 101   CO2 mmol/L 23.0 22.0   BUN mg/dL 45* 55*   CREATININE mg/dL 1.72* 2.01*   CALCIUM mg/dL 8.2* 8.7   BILIRUBIN mg/dL 0.6 0.6   ALK PHOS U/L 149* 171*   ALT (SGPT) U/L 445* 565*   AST (SGOT) U/L 243* 331*   GLUCOSE mg/dL 92 119*       Culture Data:   Wound Culture   Date Value Ref Range Status   10/16/2024 Light growth (2+) Gram Positive Cocci (A)  Preliminary       Radiology Data:   Imaging Results (Last 24 Hours)       Procedure Component Value Units Date/Time    CT Abdomen Pelvis Without Contrast [[id number]] Collected: 10/16/24 1713     Updated: 10/16/24 1723    Narrative:      EXAMINATION:  CT ABDOMEN PELVIS WO CONTRAST-  10/16/2024 3:56 PM     HISTORY: Right hip wound.     TECHNIQUE: Spiral CT was performed of the abdomen and pelvis without  contrast. Multiplanar images were reconstructed.     DLP: 175.78 mGy.cm Automated dosage reduction technique was utilized to  reduce patient dosage.     COMPARISON: 9/28/2015.     LUNG BASES: There is atelectasis in both lung bases. There is patchy  consolidation in the lingula. There is coronary artery calcification.  There is aortic and mitral valve calcification.     LIVER AND SPLEEN: The liver is very dense with a Hounsfield unit  measurement of 116. The spleen is normal density with multiple calcified  granulomas. There has been prior cholecystectomy.     PANCREAS: Normal unenhanced appearance of the pancreas.     KIDNEYS AND ADRENALS: The adrenal glands and right kidney are  unremarkable. There is a 3.5 cm left renal cyst with a Hounsfield  measurement of 11. The renal collecting systems are nondilated. There is  mild distention of the urinary bladder. There is air in the bladder.     BOWEL: No  oral contrast was given. There is under distention of the  stomach. There is gastric wall thickening. Small bowel loops are  nondilated. There is moderate to large stool in the colon. There is  diverticulosis of the colon. There is fecal impaction of the rectum  measuring 8.9 cm transversely.     OTHER: There is atheromatous disease of the aortoiliac vessels. There  has been prior bilateral hip arthroplasty. There is narrowing and  spurring of the symphysis pubis. There are degenerative changes of the  spine. There is lumbar scoliosis convex to the left. There is skin  ulceration lateral to the right ilium. There is some air within the soft  tissues in this area. There is a phlegmon measuring about 5.6 x 3.1 cm.  Simple fluid is not seen in this area. This extends to the lateral  margin of the ilium. I do not see any iliac erosive change or  destruction to suggest osteomyelitis involving the right ilium.          Impression:      1. Skin ulceration in the right pelvic region lateral to the ileum with  a 5.6 x 3.1 cm collection that is not simple fluid. This is likely an  inflammatory mass/phlegmon. There is some air in the soft tissues in  this area. This abuts the lateral margin of the right ilium. There is no  right ilium erosion or destruction to suggest osteomyelitis.  2. Atelectasis in the lung bases. Patchy lingular consolidation may  represent atelectasis or pneumonia. Coronary artery calcification.  Aortic and mitral valve calcification.  3. The liver is very dense. Hemachromatosis is considered. Increased  liver density may also be seen with chronic amiodarone therapy.  4. A 3.5 cm left renal cyst.  5. Mild distention of the urinary bladder. There is air in the urinary  bladder. Correlate with instrumentation.  6. Gastric wall thickening likely due to under distention. Gastritis and  other etiologies are also considered.  7. Diverticulosis of the colon. Fecal impaction of the rectum measuring  8.9 cm  transversely.  8. Other nonacute findings, as discussed above.        The full report of this exam was immediately signed and available to the  emergency room. The patient is currently in the emergency room.     This report was signed and finalized on 10/16/2024 5:20 PM by Dr. Yunior Chanel MD.       XR Chest 1 View [[id number]] Collected: 10/16/24 1508     Updated: 10/16/24 1512    Narrative:      EXAM: XR CHEST 1 VW-      DATE: 10/16/2024 2:03 PM     HISTORY: Cough and congestion       COMPARISON: 8/15/2017.     TECHNIQUE:  Frontal view(s) of the chest submitted.     FINDINGS:    There are bilateral opacities which may be related to edema or  multifocal pneumonia. There is elevation of the right hemidiaphragm. No  effusion or pneumothorax is seen. There is calcification of the thoracic  aorta. There is bilateral glenohumeral joint osteoarthritis and AC joint  arthropathy.          Impression:         1. Bilateral opacities in the lungs may be due to edema or multifocal  pneumonia.     This report was signed and finalized on 10/16/2024 3:09 PM by Jose Hooper.               I have reviewed the patient's current medications.     Assessment/Plan   Assessment  Active Hospital Problems    Diagnosis     **ARF (acute renal failure)     Fecal impaction     Pressure ulcer, hip, right, unstageable     Infected wound     Abnormal LFTs     Advanced age     Acute urinary retention     Pneumonia due to human metapneumovirus     Chronic constipation     Anemia        Treatment Plan  Currently NPO  To OR today with Dr. Alegria for washout of wound and likely application of wound vac  Follow-up wound culture (right hip)  Follow-up blood cultures  IV Vancomycin and Zosyn for now; hope to de-escalate soon pending culture results  MRSA PCR negative  Prelim wound culture with gram positive cocci  Check bladder scan and monitor PVR  IVFs with LR  Trial of Duonebs twice daily scheduled  Add incentive spirometry and flutter valve;  encouraged deep breathing  Bowel regimen; anticipate likely will require enema (M of M; but this will need to occur after surgery)    Discussed code status with family this morning; they would like her code status to be DNR/DNI     Medical Decision Making  Number and Complexity of problems: 3 acute; high complexity        Conditions and Status        Condition is unchanged     MDM Data  External documents reviewed: none  Cardiac tracing (EKG, telemetry) interpretation: no new EKGs  Radiology interpretation: results of CT A/P personally reviewed by me including fluid collection right lateral hip region  Labs reviewed: as above  Any tests that were considered but not ordered: none     Decision rules/scores evaluated (example [id number], Wells, etc): none     Discussed with: patient, family at bedside     Care Planning  Shared decision making: Discussed with patient and family with agreement to proceed with treatment plan as outlined  Code status and discussions: Based upon discussion with family at bedside this morning they would like CODE STATUS changed to DO NOT RESUSCITATE status    Disposition  Social Determinants of Health that impact treatment or disposition: none apparent at this time  I expect the patient to be discharged to: anticipate SNF may be required    Electronically signed by Gilbert Palmer MD, 10/17/24, 10:16 CDT.

## 2024-10-18 PROBLEM — E44.0 MODERATE MALNUTRITION: Status: ACTIVE | Noted: 2024-10-18

## 2024-10-18 LAB
ALBUMIN SERPL-MCNC: 3.3 G/DL (ref 3.5–5.2)
ALBUMIN/GLOB SERPL: 1.1 G/DL
ALP SERPL-CCNC: 131 U/L (ref 39–117)
ALT SERPL W P-5'-P-CCNC: 339 U/L (ref 1–33)
ANION GAP SERPL CALCULATED.3IONS-SCNC: 12 MMOL/L (ref 5–15)
AST SERPL-CCNC: 172 U/L (ref 1–32)
BACTERIA SPEC AEROBE CULT: ABNORMAL
BACTERIA SPEC AEROBE CULT: ABNORMAL
BILIRUB SERPL-MCNC: 0.6 MG/DL (ref 0–1.2)
BUN SERPL-MCNC: 35 MG/DL (ref 8–23)
BUN/CREAT SERPL: 22.4 (ref 7–25)
CALCIUM SPEC-SCNC: 8.9 MG/DL (ref 8.6–10.5)
CHLORIDE SERPL-SCNC: 104 MMOL/L (ref 98–107)
CO2 SERPL-SCNC: 22 MMOL/L (ref 22–29)
CREAT SERPL-MCNC: 1.56 MG/DL (ref 0.57–1)
DEPRECATED RDW RBC AUTO: 53.6 FL (ref 37–54)
EGFRCR SERPLBLD CKD-EPI 2021: 32 ML/MIN/1.73
ERYTHROCYTE [DISTWIDTH] IN BLOOD BY AUTOMATED COUNT: 14 % (ref 12.3–15.4)
GLOBULIN UR ELPH-MCNC: 3.1 GM/DL
GLUCOSE SERPL-MCNC: 108 MG/DL (ref 65–99)
GRAM STN SPEC: ABNORMAL
HCT VFR BLD AUTO: 25.2 % (ref 34–46.6)
HGB BLD-MCNC: 8 G/DL (ref 12–15.9)
MCH RBC QN AUTO: 32.9 PG (ref 26.6–33)
MCHC RBC AUTO-ENTMCNC: 31.7 G/DL (ref 31.5–35.7)
MCV RBC AUTO: 103.7 FL (ref 79–97)
PLATELET # BLD AUTO: 205 10*3/MM3 (ref 140–450)
PMV BLD AUTO: 9.4 FL (ref 6–12)
POTASSIUM SERPL-SCNC: 3.5 MMOL/L (ref 3.5–5.2)
PROT SERPL-MCNC: 6.4 G/DL (ref 6–8.5)
RBC # BLD AUTO: 2.43 10*6/MM3 (ref 3.77–5.28)
SODIUM SERPL-SCNC: 138 MMOL/L (ref 136–145)
WBC NRBC COR # BLD AUTO: 10.39 10*3/MM3 (ref 3.4–10.8)

## 2024-10-18 PROCEDURE — 97605 NEG PRS WND THER DME<=50SQCM: CPT | Performed by: PHYSICAL THERAPIST

## 2024-10-18 PROCEDURE — 94799 UNLISTED PULMONARY SVC/PX: CPT

## 2024-10-18 PROCEDURE — 94664 DEMO&/EVAL PT USE INHALER: CPT

## 2024-10-18 PROCEDURE — 85027 COMPLETE CBC AUTOMATED: CPT | Performed by: GENERAL PRACTICE

## 2024-10-18 PROCEDURE — 97162 PT EVAL MOD COMPLEX 30 MIN: CPT | Performed by: PHYSICAL THERAPIST

## 2024-10-18 PROCEDURE — 97530 THERAPEUTIC ACTIVITIES: CPT | Performed by: PHYSICAL THERAPIST

## 2024-10-18 PROCEDURE — 99024 POSTOP FOLLOW-UP VISIT: CPT | Performed by: GENERAL PRACTICE

## 2024-10-18 PROCEDURE — 80053 COMPREHEN METABOLIC PANEL: CPT | Performed by: GENERAL PRACTICE

## 2024-10-18 PROCEDURE — 25010000002 PIPERACILLIN SOD-TAZOBACTAM PER 1 G: Performed by: GENERAL PRACTICE

## 2024-10-18 PROCEDURE — 94761 N-INVAS EAR/PLS OXIMETRY MLT: CPT

## 2024-10-18 RX ORDER — AMOXICILLIN 500 MG/1
500 CAPSULE ORAL EVERY 12 HOURS SCHEDULED
Status: DISCONTINUED | OUTPATIENT
Start: 2024-10-18 | End: 2024-10-21

## 2024-10-18 RX ADMIN — Medication 10 ML: at 08:23

## 2024-10-18 RX ADMIN — POLYETHYLENE GLYCOL 3350 17 G: 17 POWDER, FOR SOLUTION ORAL at 08:23

## 2024-10-18 RX ADMIN — AMOXICILLIN 500 MG: 500 CAPSULE ORAL at 20:31

## 2024-10-18 RX ADMIN — Medication 1 TABLET: at 08:23

## 2024-10-18 RX ADMIN — BISACODYL 5 MG: 5 TABLET, COATED ORAL at 08:23

## 2024-10-18 RX ADMIN — ACETAMINOPHEN 650 MG: 325 TABLET ORAL at 06:32

## 2024-10-18 RX ADMIN — DOCUSATE SODIUM 50 MG AND SENNOSIDES 8.6 MG 2 TABLET: 8.6; 5 TABLET, FILM COATED ORAL at 20:26

## 2024-10-18 RX ADMIN — Medication 10 ML: at 20:31

## 2024-10-18 RX ADMIN — LEVOTHYROXINE SODIUM 75 MCG: 0.07 TABLET ORAL at 06:30

## 2024-10-18 RX ADMIN — ACETAMINOPHEN 650 MG: 325 TABLET ORAL at 20:26

## 2024-10-18 RX ADMIN — CITALOPRAM HYDROBROMIDE 20 MG: 20 TABLET, FILM COATED ORAL at 08:23

## 2024-10-18 RX ADMIN — IPRATROPIUM BROMIDE AND ALBUTEROL SULFATE 3 ML: .5; 3 SOLUTION RESPIRATORY (INHALATION) at 07:09

## 2024-10-18 RX ADMIN — BISACODYL 10 MG: 10 SUPPOSITORY RECTAL at 08:23

## 2024-10-18 RX ADMIN — IPRATROPIUM BROMIDE AND ALBUTEROL SULFATE 3 ML: .5; 3 SOLUTION RESPIRATORY (INHALATION) at 19:46

## 2024-10-18 RX ADMIN — PIPERACILLIN AND TAZOBACTAM 3.38 G: 3; .375 INJECTION, POWDER, FOR SOLUTION INTRAVENOUS at 12:06

## 2024-10-18 RX ADMIN — DOCUSATE SODIUM 50 MG AND SENNOSIDES 8.6 MG 2 TABLET: 8.6; 5 TABLET, FILM COATED ORAL at 08:23

## 2024-10-18 NOTE — PLAN OF CARE
Problem: Adult Inpatient Plan of Care  Goal: Plan of Care Review  Recent Flowsheet Documentation  Taken 10/18/2024 1005 by Tita Diggs, PT DPT  Progress: no change  Outcome Evaluation: PT wound eval completed for conituation of NPWT to R hip wound. Pt alert and oriented to person only with son present. Pt with c/o mild pain in upper back. Pt with intact dressing to R hip and 100 mL of sangineous drainage in canister. Wound measures 6.5 x 5 x 2 cm with palpable bone in base of wound with overlying fascia. One piece of black foam was used to fill the wound bed and good seal was achieved. Pt tolerated dressing change well. PT will check dressing daily and change 3x a week or PRN. Rec d/c SNF but patient and family report desire to return home with HH.

## 2024-10-18 NOTE — THERAPY WOUND CARE TREATMENT
Acute Care - Wound/Debridement Initial Evaluation  [facility]     Patient Name: Sofía Clifton  : 1935  MRN: [id number]  Today's Date: 10/18/2024  Onset of Illness/Injury or Date of Surgery: 10/17/24      Referring Physician: Dr. Alegria      Admit Date: 10/16/2024    Visit Dx:    ICD-10-CM ICD-9-CM   1. Acute renal failure, unspecified acute renal failure type  N17.9 584.9   2. Cellulitis of hip  L03.119 682.6   3. Pneumonia due to human metapneumovirus (hMPV)  J12.3 480.8   4. Pressure ulcer, hip, right, unstageable  L89.210 707.04     707.25   5. Impaired mobility [Z74.09]  Z74.09 799.89   6. Open wound [T14.8XXA]  T14.8XXA 879.8       Patient Active Problem List   Diagnosis    Paroxysmal atrial fibrillation    Anemia    Benign essential hypertension    Gastroesophageal reflux disease with esophagitis    Diverticulitis of colon    Chronic constipation    Osteoarthritis    Degeneration of lumbar intervertebral disc    Hypercholesteremia    Pericardial cyst    Chest pain    Melena    Hypothyroidism (acquired)    Acute blood loss anemia - requiring transfusion    PAD (peripheral artery disease)    RLS (restless legs syndrome)    ARF (acute renal failure)    Pressure ulcer, hip, right, unstageable    Infected wound    Abnormal LFTs    Advanced age    Acute urinary retention    Pneumonia due to human metapneumovirus    Fecal impaction    Moderate malnutrition        Past Medical History:   Diagnosis Date    Anemia 2016    Benign essential hypertension 2016    Chronic constipation 2016    Degeneration of lumbar intervertebral disc 2016    Diverticulitis of colon 2016    Gastroesophageal reflux disease with esophagitis 2016    Hypercholesteremia 2016    Osteoarthritis 2016    Paroxysmal atrial fibrillation 2016        Past Surgical History:   Procedure Laterality Date    APPENDECTOMY      BACK SURGERY      C-SPINE, LUMBAR SPINE    BLADDER SURGERY       CHOLECYSTECTOMY      ENDOSCOPY N/A 07/12/2023    Distal Esophagitis LA Grade A otherwise normal exam    ENDOSCOPY  09/29/2015    Non-bleeding erosive gastropathy    HYSTERECTOMY      JOINT REPLACEMENT Right     HIP    KNEE SURGERY Right     TOTAL HIP ARTHROPLASTY Left     WOUND DEBRIDEMENT Right 10/17/2024    Procedure: DEBRIDEMENT OF RIGHT HIP WOUND, WOUND VAC PLACEMENT, FECAL DISIMPACTION;  Surgeon: Wade Alegria MD;  Location: [facility] OR;  Service: General;  Laterality: Right;           Wound 10/16/24 2147 Bilateral gluteal (Active)   Dressing Appearance open to air 10/18/24 0823   Closure Open to air 10/18/24 0823   Base red;pink 10/18/24 0823   Periwound excoriated;redness 10/18/24 0823   Care, Wound cleansed with;soap and water;barrier applied 10/18/24 0823   Dressing Care open to air 10/18/24 0823       Wound 10/16/24 2000 Right hip (Active)   Dressing Appearance intact;dry 10/18/24 0823   Closure Unable to assess 10/18/24 0823   Base unable to visualize 10/18/24 0823   Care, Wound negative pressure wound therapy 10/18/24 0823       Wound 10/17/24 1038 Right anterior greater trochanter surgical (Active)   Wound Image   10/18/24 1005   Pressure Injury Stage 4 10/18/24 1005   Dressing Appearance intact 10/18/24 1005   Closure Unable to assess 10/17/24 2033   Base bleeding;exposed structure;red;slough 10/18/24 1005   Red (%), Wound Tissue Color 40 10/18/24 1005   Yellow (%), Wound Tissue Color 60 10/18/24 1005   Periwound intact 10/18/24 1005   Periwound Temperature warm 10/18/24 1005   Periwound Skin Turgor soft 10/18/24 1005   Edges open 10/18/24 1005   Wound Length (cm) 6.5 cm 10/18/24 1005   Wound Width (cm) 5 cm 10/18/24 1005   Wound Depth (cm) 2 cm 10/18/24 1005   Wound Surface Area (cm^2) 32.5 cm^2 10/18/24 1005   Wound Volume (cm^3) 65 cm^3 10/18/24 1005   Drainage Characteristics/Odor sanguineous 10/18/24 1005   Drainage Amount small 10/18/24 1005   Care, Wound cleansed with;sterile normal  saline;negative pressure wound therapy 10/18/24 1005   Dressing Care dressing changed 10/18/24 1005   Periwound Care barrier film applied;barrier ointment applied;dry periwound area maintained 10/18/24 1005   Wound Output (mL) 100 10/18/24 1005       NPWT (Negative Pressure Wound Therapy) 10/17/24 right trocar (Active)   Therapy Setting continuous therapy 10/18/24 1005   Dressing foam, black 10/18/24 1005   Pressure Setting 125 mmHg 10/18/24 1005   Sponges Inserted 1 10/18/24 1005   Sponges Removed 1 10/18/24 1005   Finger sweep complete Yes 10/18/24 1005         WOUND DEBRIDEMENT                     PT Assessment (Last 12 Hours)       PT Evaluation and Treatment       Row Name 10/18/24 1005          Physical Therapy Time and Intention    Subjective Information complains of;pain  -SB     Document Type evaluation;wound care  -SB     Mode of Treatment physical therapy  -SB       Row Name 10/18/24 1005          General Information    Patient Profile Reviewed yes  -SB     Onset of Illness/Injury or Date of Surgery 10/17/24  -SB     Referring Physician Dr. Alegria  -SB     Patient Observations alert;cooperative;agree to therapy  -SB     Existing Precautions/Restrictions fall;other (see comments)  wound vac on R hip  -SB     Barriers to Rehab medically complex;previous functional deficit;cognitive status;physical barrier  -SB       Row Name 10/18/24 1005          Pain    Pain Location back  -SB     Pain Side/Orientation upper  -SB     Pain Management Interventions positioning techniques utilized  -SB       Row Name 10/18/24 1005          Pain Scale: FACES Pre/Post-Treatment    Pain: FACES Scale, Pretreatment 4-->hurts little more  -SB     Posttreatment Pain Rating 4-->hurts little more  -SB       Row Name 10/18/24 1005          Cognition    Orientation Status (Cognition) oriented to;person  -SB       Row Name 10/18/24 1005          Bed Mobility    Bed Mobility rolling left;rolling right;scooting/bridging  -SB     Rolling  Left [place] (Bed Mobility) moderate assist (50% patient effort)  -SB     Rolling Right [place] (Bed Mobility) maximum assist (25% patient effort);verbal cues  -SB     Scooting/Bridging [place] (Bed Mobility) dependent (less than 25% patient effort);2 person assist  -SB     Assistive Device (Bed Mobility) bed rails;head of bed elevated;repositioning sheet  -SB     Comment, (Bed Mobility) performed multiple rolls for hygiene and linen change, as well as repositioning for w/v  -SB       Row Name             Wound 10/16/24 2147 Bilateral gluteal    Wound - Properties Group Placement Date: 10/16/24  -SC Placement Time: 2147  -SC Side: Bilateral  -SC Location: gluteal  -SC Present on Original Admission: Y  -SC    Retired Wound - Properties Group Placement Date: 10/16/24  -SC Placement Time: 2147  -SC Present on Original Admission: Y  -SC Side: Bilateral  -SC Location: gluteal  -SC    Retired Wound - Properties Group Placement Date: 10/16/24  -SC Placement Time: 2147  -SC Present on Original Admission: Y  -SC Side: Bilateral  -SC Location: gluteal  -SC    Retired Wound - Properties Group Date first assessed: 10/16/24  -SC Time first assessed: 2147  -SC Present on Original Admission: Y  -SC Side: Bilateral  -SC Location: gluteal  -SC      Row Name 10/18/24 1005          Wound 10/16/24 2000 Right hip    Wound - Properties Group Placement Date: 10/16/24  -SC Placement Time: 2000  -SC Side: Right  -SC Location: hip  -SC    Wound Output (mL) --  -SB     Retired Wound - Properties Group Placement Date: 10/16/24  -SC Placement Time: 2000  -SC Side: Right  -SC Location: hip  -SC    Retired Wound - Properties Group Placement Date: 10/16/24  -SC Placement Time: 2000  -SC Side: Right  -SC Location: hip  -SC    Retired Wound - Properties Group Date first assessed: 10/16/24  -SC Time first assessed: 2000  -SC Side: Right  -SC Location: hip  -SC      Row Name 10/18/24 1005          Wound 10/17/24 1038 Right anterior  greater trochanter surgical    Wound - Properties Group Placement Date: 10/17/24  -SBA Placement Time: 1038  -SBA Side: Right  -SBA Orientation: anterior  -SBA Location: greater trochanter  -SBA Primary Wound Type: Incision  -SBA Type: surgical  -SBA Present on Original Admission: Y  -SBA    Wound Image Images linked: 1  -SB     Pressure Injury Stage 4  -SB     Dressing Appearance intact  -SB     Base bleeding;exposed structure;red;slough  -SB     Red (%), Wound Tissue Color 40  -SB     Yellow (%), Wound Tissue Color 60  -SB     Periwound intact  -SB     Periwound Temperature warm  -SB     Periwound Skin Turgor soft  -SB     Edges open  -SB     Wound Length (cm) 6.5 cm  -SB     Wound Width (cm) 5 cm  -SB     Wound Depth (cm) 2 cm  -SB     Wound Surface Area (cm^2) 32.5 cm^2  -SB     Wound Volume (cm^3) 65 cm^3  -SB     Drainage Characteristics/Odor sanguineous  -SB     Drainage Amount small  -SB     Care, Wound cleansed with;sterile normal saline;negative pressure wound therapy  -SB     Dressing Care dressing changed  -SB     Periwound Care barrier film applied;barrier ointment applied;dry periwound area maintained  -SB     Wound Output (mL) 100  -SB     Retired Wound - Properties Group Placement Date: 10/17/24  -SBA Placement Time: 1038  -SBA Present on Original Admission: Y  -SBA Side: Right  -SBA Orientation: anterior  -SBA Location: greater trochanter  -SBA Primary Wound Type: Incision  -SBA Type: surgical  -SBA    Retired Wound - Properties Group Placement Date: 10/17/24  -SBA Placement Time: 1038  -SBA Present on Original Admission: Y  -SBA Side: Right  -SBA Orientation: anterior  -SBA Location: greater trochanter  -SBA Primary Wound Type: Incision  -SBA Type: surgical  -SBA    Retired Wound - Properties Group Date first assessed: 10/17/24  -SBA Time first assessed: 1038  -SBA Present on Original Admission: Y  -SBA Side: Right  -SBA Location: greater trochanter  -SBA Primary Wound Type: Incision  -SBA Type:  surgical  -SBA      Row Name 10/18/24 1005          NPWT (Negative Pressure Wound Therapy) 10/17/24 right trocar    NPWT (Negative Pressure Wound Therapy) - Properties Group Placement Date: 10/17/24  [facility] Location: right trocar  - Additional Comments: presetn upon arrival to [facility]    Therapy Setting continuous therapy  -SB     Dressing foam, black  -SB     Pressure Setting 125 mmHg  -SB     Sponges Inserted 1  -SB     Sponges Removed 1  -SB     Finger sweep complete Yes  -SB     Retired NPWT (Negative Pressure Wound Therapy) - Properties Group Placement Date: 10/17/24  - Location: right trocar  - Additional Comments: presetn upon arrival to [facility]    Retired NPWT (Negative Pressure Wound Therapy) - Properties Group Placement Date: 10/17/24  - Location: right trocar  - Additional Comments: presetn upon arrival to [facility]    Retired NPWT (Negative Pressure Wound Therapy) - Properties Group Placement Date: 10/17/24  - Location: right trocar  - Additional Comments: presetn upon arrival to [facility]      Row Name 10/18/24 1005          Plan of Care Review    Plan of Care Reviewed With patient  -SB     Progress no change  -SB     Outcome Evaluation PT wound eval completed for conituation of NPWT to R hip wound. Pt alert and oriented to person only with son present. Pt with c/o mild pain in upper back. Pt with intact dressing to R hip and 100 mL of sangineous drainage in canister. Wound measures 6.5 x 5 x 2 cm with palpable bone in base of wound with overlying fascia. One piece of black foam was used to fill the wound bed and good seal was achieved. Pt tolerated dressing change well. PT will check dressing daily and change 3x a week or PRN. Rec d/c SNF but patient and family report desire to return home with HH.  -SB       Row Name 10/18/24 1005          Positioning and Restraints    Pre-Treatment Position in bed  -SB     Post Treatment Position bed  -SB     In Bed notified nscole;arnold;call light  within reach;encouraged to call for assist;with family/caregiver;with PT  -SB       Row Name 10/18/24 1005          Therapy Assessment/Plan (PT)    Patient/Family Therapy Goals Statement (PT) wound healing  -SB     Rehab Potential (PT) good  -SB     Criteria for Skilled Interventions Met (PT) yes;meets criteria;skilled treatment is necessary  -SB     Therapy Frequency (PT) daily  -SB     Predicted Duration of Therapy Intervention (PT) until d/c or goals met  -SB       Row Name 10/18/24 1005          Physical Therapy Goals    Wound Management Goal Selection (PT) wound management, PT goal 1;wound management, PT goal 2;wound management, PT goal 3  -SB       Row Name 10/18/24 1005          Wound Management Goal 1 (PT)    Wound Management Goal (Wound Goal 1, PT) R hip wound will decrease in length from 6.5 cm to 6 cm  -SB     Time Frame (Wound Goal 1, PT) long-term goal (LTG)  -SB     Progress/Outcomes (Wound Goal 1, PT) goal ongoing  -SB       Row Name 10/18/24 1005          Wound Management Goal 2 (PT)    Wound Management Goal (Wound Goal 2, PT) R hip wound will decrease in width from 5 cm to 4.5 cm  -SB     Time Frame (Wound Goal 2, PT) long-term goal (LTG)  -SB     Progress/Outcomes (Wound Goal 2, PT) goal ongoing  -SB       Row Name 10/18/24 1005          Wound Management Goal 3 (PT)    Wound Management Goal (Wound Goal 3, PT) R hip wound will decrease in depth from 2 cm to 1.5 cm  -SB     Time Frame (Wound Goal 3, PT) long-term goal (LTG)  -SB     Progress/Outcomes (Wound Goal 3, PT) goal ongoing  -SB               User Key  (r) = Recorded By, (t) = Taken By, (c) = Cosigned By      Initials Name Provider Type    Shea Wiggins RN Registered Nurse    Tita Turner, PT DPT Physical Therapist    Jacquelyn Whitaker, RN Registered Nurse    Rosemarie Che, RN Registered Nurse                  Physical Therapy Education       Title: PT OT SLP Therapies (In Progress)       Topic: Physical Therapy (In Progress)        Point: Mobility training (In Progress)       Learning Progress Summary            Patient Acceptance, E, NR by SB at 10/18/2024 1508    Comment: edu on wound vac, alarm system, pressure relief    Acceptance, E,D, VU,DU,NR by BL at 10/18/2024 1020    Comment: Educated on body mechanics to perform functional mobility more easily. Educated on the importance of repositioning to prevent pressure injuries.   Family Acceptance, E,D, VU,DU,NR by BL at 10/18/2024 1020    Comment: Educated on body mechanics to perform functional mobility more easily. Educated on the importance of repositioning to prevent pressure injuries.                      Point: Body mechanics (In Progress)       Learning Progress Summary            Patient Acceptance, E, NR by SB at 10/18/2024 1508    Comment: edu on wound vac, alarm system, pressure relief    Acceptance, E,D, VU,DU,NR by BL at 10/18/2024 1020    Comment: Educated on body mechanics to perform functional mobility more easily. Educated on the importance of repositioning to prevent pressure injuries.   Family Acceptance, E,D, VU,DU,NR by BL at 10/18/2024 1020    Comment: Educated on body mechanics to perform functional mobility more easily. Educated on the importance of repositioning to prevent pressure injuries.                      Point: Precautions (In Progress)       Learning Progress Summary            Patient Acceptance, E, NR by SB at 10/18/2024 1508    Comment: edu on wound vac, alarm system, pressure relief    Acceptance, E,D, VU,DU,NR by BL at 10/18/2024 1020    Comment: Educated on body mechanics to perform functional mobility more easily. Educated on the importance of repositioning to prevent pressure injuries.   Family Acceptance, E,D, VU,DU,NR by BL at 10/18/2024 1020    Comment: Educated on body mechanics to perform functional mobility more easily. Educated on the importance of repositioning to prevent pressure injuries.                                      User Key        Initials Effective Dates Name Provider Type Discipline    SB 07/11/23 -  Tita Diggs, PT DPT Physical Therapist PT    BL 08/21/24 -  Jonel Ann, [place] Student PT Student PT                    Recommendation and Plan  Anticipated Equipment Needs at Discharge (PT): hospital bed  Anticipated Discharge Disposition (PT): skilled nursing facility  Planned Therapy Interventions (PT): balance training, bed mobility training, gait training, patient/family education, postural re-education, ROM (range of motion), strengthening, transfer training, wound care  Therapy Frequency (PT): daily  Plan of Care Reviewed With: patient   Progress: no change       Progress: no change  Outcome Evaluation: PT wound eval completed for conituation of NPWT to R hip wound. Pt alert and oriented to person only with son present. Pt with c/o mild pain in upper back. Pt with intact dressing to R hip and 100 mL of sangineous drainage in canister. Wound measures 6.5 x 5 x 2 cm with palpable bone in base of wound with overlying fascia. One piece of black foam was used to fill the wound bed and good seal was achieved. Pt tolerated dressing change well. PT will check dressing daily and change 3x a week or PRN. Rec d/c SNF but patient and family report desire to return home with HH.  Plan of Care Reviewed With: patient            Time Calculation   PT Charges       Row Name 10/18/24 1510 10/18/24 1021          Time Calculation    Start Time 1006  add 10 min for CR for a total of 94 min  -SB 0922  add 15 min for chart review.  -SB (r) BL (t) SB (c)     Stop Time 1130  -SB 1001  -SB (r) BL (t) SB (c)     Time Calculation (min) 84 min  -SB 39 min  -SB (r) BL (t)     PT Non-Billable Time (min) 14 min  -SB --     PT Received On 10/18/24  -SB 10/18/24  -SB (r) BL (t) SB (c)     PT Goal Re-Cert Due Date 10/28/24  -SB 10/28/24  -SB (r) BL (t) SB (c)        Time Calculation- PT    Total Timed Code Minutes- PT 20 minute(s)  -SB --        Timed Charges     66695 - PT Therapeutic Activity Minutes 20  -SB --        Untimed Charges    PT Eval/Re-eval Minutes -- 54  -SB (r) BL (t) SB (c)        Total Minutes    Timed Charges Total Minutes 20  -SB --     Untimed Charges Total Minutes -- 54  -SB (r) BL (t)      Total Minutes 20  -SB 54  -SB (r) BL (t)               User Key  (r) = Recorded By, (t) = Taken By, (c) = Cosigned By      Initials Name Provider Type    SB Tita Diggs, PT DPT Physical Therapist    BL Jonel Ann, PT Student PT Student                      Therapy Charges for Today       Code Description Service Date Service Provider Modifiers Qty    49122428536 HC PT THERAPEUTIC ACT EA 15 MIN 10/18/2024 Tita Diggs, PT DPT GP 1    30522697661 HC PT EVAL MOD COMPLEXITY 4 10/18/2024 Tita Diggs, PT DPT GP 1    93678871483 HC PT NEG PRESS WOUND TO 50SQCM DME4 10/18/2024 Tita Diggs, PT DPT  1              PT G-Codes  Outcome Measure Options: AM-PAC 6 Clicks Basic Mobility (PT)  AM-PAC 6 Clicks Score (PT): 12       Tita Diggs PT DPT  10/18/2024

## 2024-10-18 NOTE — CASE MANAGEMENT/SOCIAL WORK
Continued Stay Note   [facility]     Patient Name: Sofía Clifton  MRN: [id number]  Today's Date: 10/18/2024    Admit Date: 10/16/2024    Plan: Home with [facility] and woundvac   Discharge Plan       Row Name 10/18/24 1257       Plan    Plan Comments [employer] has advised patient would need a group 2 or 3 support surface at home for wound management before Wound[facility] will be approved.                   Discharge Codes    No documentation.                       ZEENAT Kruger

## 2024-10-18 NOTE — PLAN OF CARE
Goal Outcome Evaluation:  Plan of Care Reviewed With: patient        Progress: no change  Outcome Evaluation: Alert to self. VSS. No c/o pain. Wound vac to right hip, CDI-changed by PT today. Family at bedside, on room air. ABX changed to PO. Assist x1/2 to transfer-able to stand edge of bed, unable to ambulate. Isolation maintained d/t metapneumovirus. SCD's. bed alarm set. call light in reach, safety maintained.

## 2024-10-18 NOTE — PLAN OF CARE
Goal Outcome Evaluation:  Plan of Care Reviewed With: patient        Progress: no change        Pt disoriented. No complaints of pain so far during shift. IVF and abx infused per order. Turn Q2H. Wound vac to right hip. Preventative/treatment foam dressings applied. Purewick in place for incontinence, Contact precautions maintained. Son at bedside. Safety maintained.     Straight cathed x1 due to urine retention

## 2024-10-18 NOTE — PROGRESS NOTES
[facility]  INPATIENT PROGRESS NOTE    Patient Name: Sofía Clifton  Date of Admission: 10/16/2024  Today's Date: 10/18/24  Length of Stay: 2  Primary Care Physician: Sukhi Pelayo MD    Subjective   Chief Complaint: f/u wound/infection    HPI   Patient resting comfortably in bed.  Wound VAC in place.  No complaints.  Denies chest pain.  Denies nausea or shortness of breath.  Tolerating p.o. per staff.      Review of Systems   All pertinent negatives and positives are as above. All other systems have been reviewed and are negative unless otherwise stated.     Objective    Temp:  [97.4 °F (36.3 °C)-98.3 °F (36.8 °C)] 98.2 °F (36.8 °C)  Heart Rate:  [63-72] 72  Resp:  [14-18] 18  BP: ()/(43-64) 128/64  Physical Exam  GEN: Awake, alert, interactive, in NAD  HEENT:  PERRLA, EOMI, Anicteric, Trachea midline  Lungs: no wheezing/rales/rhonchi  Heart: RRR, +S1/s2, no rub  ABD: soft, nt/nd, +BS, no guarding/rebound  Extremities: no cyanosis, no pitting edema  Skin: wound vac in place to R hip/iliac crest  Neuro: HAMILTON, no obvious focal deficits     Results Review:  I have reviewed the labs, radiology results, and diagnostic studies.    Laboratory Data:   Results from last 7 days   Lab Units 10/18/24  0517 10/17/24  0438 10/16/24  1549   WBC 10*3/mm3 10.39 11.32* 11.89*   HEMOGLOBIN g/dL 8.0* 8.6* 8.6*   HEMATOCRIT % 25.2* 26.6* 26.4*   PLATELETS 10*3/mm3 205 195 198        Results from last 7 days   Lab Units 10/18/24  0517 10/17/24  0438 10/16/24  1549   SODIUM mmol/L 138 136 135*   POTASSIUM mmol/L 3.5 3.5 3.8   CHLORIDE mmol/L 104 102 101   CO2 mmol/L 22.0 23.0 22.0   BUN mg/dL 35* 45* 55*   CREATININE mg/dL 1.56* 1.72* 2.01*   CALCIUM mg/dL 8.9 8.2* 8.7   BILIRUBIN mg/dL 0.6 0.6 0.6   ALK PHOS U/L 131* 149* 171*   ALT (SGPT) U/L 339* 445* 565*   AST (SGOT) U/L 172* 243* 331*   GLUCOSE mg/dL 108* 92 119*       Culture Data:   Blood Culture   Date Value Ref  Range Status   10/16/2024 No growth at 24 hours  Preliminary   10/16/2024 No growth at 24 hours  Preliminary     Wound Culture   Date Value Ref Range Status   10/17/2024 Scant growth (1+) Enterococcus faecalis (A)  Final   10/16/2024 Light growth (2+) Enterococcus faecalis (A)  Final       Radiology Data:   Imaging Results (Last 24 Hours)       ** No results found for the last 24 hours. **            I have reviewed the patient's current medications.     Assessment/Plan   Assessment  Active Hospital Problems    Diagnosis     **ARF (acute renal failure)     Moderate malnutrition     Fecal impaction     Pressure ulcer, hip, right, unstageable     Infected wound     Abnormal LFTs     Advanced age     Acute urinary retention     Pneumonia due to human metapneumovirus     Chronic constipation     Anemia        Treatment Plan  #1 ANTONIO - continues to improve slowly, ivf now off, encourage po intake. Creat down to 1.5 and gfr 32. Avoid nephrotoxic meds. D/c vanco/zosyn.     #2 Pressure wound/infection - s/p OR yesterday for debridement and washout. No signs of osteo. Wound vac in place. Cultures growing Enterococcus faecalis.  DC Vanco and Zosyn.  Start amoxicillin    #3 human metapneumovirus -supportive care.  On room air.    #4 fecal impaction -status post BM.  Continue bowel regimen    #5 transaminitis -trending down.  Continue supportive care.  Unclear initial etiology?  Check CK level.  Patient is not on statin    #6 chronic anemia -H&H fairly stable.  No signs of obvious bleeding.    Medical Decision Making  Number and Complexity of problems: 4-5 acute, multiple chronic  Differential Diagnosis: as above    Conditions and Status        New to me, interactive, does not appear toxic     MDM Data  External documents reviewed: none  Cardiac tracing (EKG, telemetry) interpretation: none  Radiology interpretation: reports reviewed  Labs reviewed: as above  Any tests that were considered but not ordered: none     Decision  rules/scores evaluated (example [id number], [place], etc): none     Discussed with: patient, Dr. Alegria,      Care Planning  Shared decision making: Patient apprised of current labs, vitals, imaging and treatment plan.  They are agreeable with proceeding with plans as discussed.    Code status and discussions: dnr/dni    Disposition  Social Determinants of Health that impact treatment or disposition: none  I expect the patient to be discharged to ? Home w/ HHS vs SNF. Needs woundvac and specialty mattress if going home.     Electronically signed by Xavier Collins DO, 10/18/24, 12:30 CDT.

## 2024-10-18 NOTE — PLAN OF CARE
Goal Outcome Evaluation:  Plan of Care Reviewed With: patient, child        Progress: no change  Outcome Evaluation: PT Eval complete. Oriented to person only. Presents in supine with wound vac on R hip and son at bed side. Able to come to sitting EOB with min. assist. Shows poor BLE strength (3/5) with all sensation intact. Able to perform sit to stand with min assist. given through glute and HHA. Required min. assist. with balance in sitting and standing. Able to return to supine with min. assist. but was dependent for scooting up in bed. Pt. son reports that pt. is interested in getting a hospital bed at home. Skilled therapy recommended to address deficits in functional mobility, endurance, and strength. D/C recommended to skilled nursing facility.    Anticipated Discharge Disposition (PT): skilled nursing facility

## 2024-10-18 NOTE — THERAPY EVALUATION
Patient Name: Sofía Clifton  : 1935    MRN: [id number]                              Today's Date: 10/18/2024       Admit Date: 10/16/2024    Visit Dx:     ICD-10-CM ICD-9-CM   1. Acute renal failure, unspecified acute renal failure type  N17.9 584.9   2. Cellulitis of hip  L03.119 682.6   3. Pneumonia due to human metapneumovirus (hMPV)  J12.3 480.8   4. Pressure ulcer, hip, right, unstageable  L89.210 707.04     707.25     Patient Active Problem List   Diagnosis    Paroxysmal atrial fibrillation    Anemia    Benign essential hypertension    Gastroesophageal reflux disease with esophagitis    Diverticulitis of colon    Chronic constipation    Osteoarthritis    Degeneration of lumbar intervertebral disc    Hypercholesteremia    Pericardial cyst    Chest pain    Melena    Hypothyroidism (acquired)    Acute blood loss anemia - requiring transfusion    PAD (peripheral artery disease)    RLS (restless legs syndrome)    ARF (acute renal failure)    Pressure ulcer, hip, right, unstageable    Infected wound    Abnormal LFTs    Advanced age    Acute urinary retention    Pneumonia due to human metapneumovirus    Fecal impaction    Moderate malnutrition     Past Medical History:   Diagnosis Date    Anemia 2016    Benign essential hypertension 2016    Chronic constipation 2016    Degeneration of lumbar intervertebral disc 2016    Diverticulitis of colon 2016    Gastroesophageal reflux disease with esophagitis 2016    Hypercholesteremia 2016    Osteoarthritis 2016    Paroxysmal atrial fibrillation 2016     Past Surgical History:   Procedure Laterality Date    APPENDECTOMY      BACK SURGERY      C-SPINE, LUMBAR SPINE    BLADDER SURGERY      CHOLECYSTECTOMY      ENDOSCOPY N/A 2023    Distal Esophagitis LA Grade A otherwise normal exam    ENDOSCOPY  2015    Non-bleeding erosive gastropathy    HYSTERECTOMY      JOINT REPLACEMENT Right     HIP    KNEE SURGERY  Right     TOTAL HIP ARTHROPLASTY Left     WOUND DEBRIDEMENT Right 10/17/2024    Procedure: DEBRIDEMENT OF RIGHT HIP WOUND, WOUND VAC PLACEMENT, FECAL DISIMPACTION;  Surgeon: Wade Alegria MD;  Location: [facility];  Service: General;  Laterality: Right;      General Information       Row Name 10/18/24 0922          Physical Therapy Time and Intention    Document Type evaluation  DX:ARF (acute renal failure). Presents with confusion, weakness, and fatigue, decreased P.O. intake  -SB (r) BL (t) SB (c)     Mode of Treatment physical therapy  PMH: dementia, hypertension, chronic constipation  -SB (r) BL (t) SB (c)       Row Name 10/18/24 0922          General Information    Patient Profile Reviewed yes  -SB (r) BL (t) SB (c)     Prior Level of Function min assist:;transfer;feeding;grooming;dressing;bathing;dependent:;w/c or scooter;home management;cooking;cleaning;driving;shopping  uses W/C at baseline able to perform stand pivot transfer to the W/C  -SB (r) BL (t) SB (c)     Existing Precautions/Restrictions fall;other (see comments)  wound vac on R hip.  -SB (r) BL (t) SB (c)     Barriers to Rehab cognitive status;medically complex;physical barrier  -SB (r) BL (t) SB (c)       Row Name 10/18/24 0922          Living Environment    People in Home child(ariadna), adult  -SB (r) BL (t) SB (c)       Row Name 10/18/24 0922          Home Main Entrance    Number of Stairs, Main Entrance none  -SB (r) BL (t) SB (c)     Stair Railings, Main Entrance none  -SB (r) BL (t) SB (c)       Row Name 10/18/24 0922          Stairs Within Home, Primary    Number of Stairs, Within Home, Primary none  -SB (r) BL (t) SB (c)     Stair Railings, Within Home, Primary none  -SB (r) BL (t) SB (c)       Row Name 10/18/24 0922          Cognition    Orientation Status (Cognition) oriented to;person  -SB (r) BL (t) SB (c)       Row Name 10/18/24 0922          Safety Issues/Impairments Affecting Functional Mobility    Safety Issues Affecting Function  (Mobility) at risk behavior observed;friction/shear risk;safety precaution awareness  -SB (r) BL (t) SB (c)     Impairments Affecting Function (Mobility) balance;cognition;coordination;endurance/activity tolerance;strength  -SB (r) BL (t) SB (c)     Cognitive Impairments, Mobility Safety/Performance attention;insight into deficits/self-awareness;safety precaution awareness;problem-solving/reasoning;safety precaution follow-through  -SB (r) BL (t) SB (c)               User Key  (r) = Recorded By, (t) = Taken By, (c) = Cosigned By      Initials Name Provider Type    Tita Turner, PT DPT Physical Therapist    BL Jonel Ann, PT Student PT Student                   Mobility       Row Name 10/18/24 1007          Bed Mobility    Bed Mobility rolling left;scooting/bridging;supine-sit;sit-supine  -SB (r) BL (t) SB (c)     Rolling Left [place] (Bed Mobility) minimum assist (75% patient effort)  -SB (r) BL (t) SB (c)     Scooting/Bridging [place] (Bed Mobility) dependent (less than 25% patient effort)  -SB (r) BL (t) SB (c)     Supine-Sit [place] (Bed Mobility) minimum assist (75% patient effort)  -SB (r) BL (t) SB (c)     Sit-Supine [place] (Bed Mobility) minimum assist (75% patient effort)  -SB (r) BL (t) SB (c)     Assistive Device (Bed Mobility) bed rails;head of bed elevated  -SB (r) BL (t) SB (c)       Row Name 10/18/24 1007          Sit-Stand Transfer    Sit-Stand [place] (Transfers) minimum assist (75% patient effort)  -SB (r) BL (t) SB (c)     Assistive Device (Sit-Stand Transfers) other (see comments)  HHA  -SB (r) BL (t) SB (c)               User Key  (r) = Recorded By, (t) = Taken By, (c) = Cosigned By      Initials Name Provider Type    Tita Turner PT DPT Physical Therapist    Jonel Sommers, PT Student PT Student                   Obj/Interventions       Row Name 10/18/24 1009          Range of Motion Comprehensive    General Range of Motion bilateral lower extremity ROM  WFL  -SB (r) BL (t) SB (c)       Row Name 10/18/24 1009          Strength Comprehensive (MMT)    General Manual Muscle Testing (MMT) Assessment lower extremity strength deficits identified  -SB (r) BL (t) SB (c)     Comment, General Manual Muscle Testing (MMT) Assessment gross BLE strength 3/5  -SB (r) BL (t) SB (c)       Row Name 10/18/24 1009          Balance    Balance Assessment sitting static balance;sitting dynamic balance;standing static balance;standing dynamic balance  -SB (r) BL (t) SB (c)     Static Sitting Balance minimal assist  -SB (r) BL (t) SB (c)     Dynamic Sitting Balance minimal assist  -SB (r) BL (t) SB (c)     Position, Sitting Balance supported;sitting in chair  -SB (r) BL (t) SB (c)     Static Standing Balance minimal assist;1-person assist  HHA  -SB (r) BL (t) SB (c)     Dynamic Standing Balance minimal assist;1-person assist  HHA  -SB (r) BL (t) SB (c)     Position/Device Used, Standing Balance supported;other (see comments)  HHA  -SB (r) BL (t) SB (c)     Balance Interventions sitting;standing;sit to stand;supported;static;dynamic;narrowed base of support;occupation based/functional task  -SB (r) BL (t) SB (c)     Comment, Balance Pt. has very poor balance in sitting and standing  -SB (r) BL (t) SB (c)       Row Name 10/18/24 1009          Sensory Assessment (Somatosensory)    Sensory Assessment (Somatosensory) LE sensation intact  -SB (r) BL (t) SB (c)               User Key  (r) = Recorded By, (t) = Taken By, (c) = Cosigned By      Initials Name Provider Type    Tita Turner, PT DPT Physical Therapist    BL Jonel Ann, EVERETT Student PT Student                   Goals/Plan       Row Name 10/18/24 0922          Bed Mobility Goal 1 (PT)    Activity/Assistive Device (Bed Mobility Goal 1, PT) rolling to left;rolling to right;scooting;sit to supine;supine to sit  -SB (r) BL (t) SB (c)     [place] Level/Cues Needed (Bed Mobility Goal 1, PT) contact guard required  -SB (r) BL (t) SB (c)      Time Frame (Bed Mobility Goal 1, PT) long term goal (LTG)  -SB (r) BL (t) SB (c)     Progress/Outcomes (Bed Mobility Goal 1, PT) new goal  -SB (r) BL (t) SB (c)       Row Name 10/18/24 0922          Transfer Goal 1 (PT)    Activity/Assistive Device (Transfer Goal 1, PT) sit-to-stand/stand-to-sit;bed-to-chair/chair-to-bed  -SB (r) BL (t) SB (c)     [place] Level/Cues Needed (Transfer Goal 1, PT) contact guard required  -SB (r) BL (t) SB (c)     Time Frame (Transfer Goal 1, PT) long term goal (LTG)  -SB (r) BL (t) SB (c)     Progress/Outcome (Transfer Goal 1, PT) new goal  -SB (r) BL (t) SB (c)       Row Name 10/18/24 0922          Balance Goal 1 (PT)    Activity/Assistive Device (Balance Goal) sitting static balance;sitting dynamic balance;sit to stand dynamic balance;standing static balance;standing dynamic balance;supported;with ADLs;with functional activities/occupations;with functional mobility activities;walker, rolling  -SB (r) BL (t) SB (c)     [place] Level/Cues Needed (Balance Goal 1, PT) contact guard required  -SB (r) BL (t) SB (c)     Time Frame (Balance Goal 1, PT) long-term goal (LTG)  -SB (r) BL (t) SB (c)     Progress/Outcomes (Balance Goal 1, PT) other (see comments)  new goal  -SB (r) BL (t) SB (c)       Row Name 10/18/24 0922          Therapy Assessment/Plan (PT)    Planned Therapy Interventions (PT) balance training;bed mobility training;home exercise program;patient/family education;transfer training;strengthening  -SB (r) BL (t) SB (c)               User Key  (r) = Recorded By, (t) = Taken By, (c) = Cosigned By      Initials Name Provider Type    Tita Turner, PT DPT Physical Therapist    Jonel Sommers, PT Student PT Student                   Clinical Impression       Row Name 10/18/24 1012          Pain    Pain Location back  -SB (r) BL (t) SB (c)     Pain Side/Orientation generalized  -SB (r) BL (t) SB (c)     Pain Management Interventions positioning techniques  utilized;exercise or physical activity utilized  -SB (r) BL (t) SB (c)     Response to Pain Interventions activity participation with tolerable pain  -SB (r) BL (t) SB (c)     Additional Documentation Pain Scale: FACES Pre/Post-Treatment (Group)  -SB (r) BL (t) SB (c)       Row Name 10/18/24 1012          Pain Scale: FACES Pre/Post-Treatment    Pain: FACES Scale, Pretreatment 2-->hurts little bit  -SB (r) BL (t) SB (c)     Posttreatment Pain Rating 4-->hurts little more  -SB (r) BL (t) SB (c)       Row Name 10/18/24 1012          Plan of Care Review    Plan of Care Reviewed With patient;child  -SB (r) BL (t) SB (c)     Progress no change  -SB (r) BL (t) SB (c)     Outcome Evaluation PT Eval complete. Oriented to person only. Presents in supine with wound vac on R hip and son at bed side. Able to come to sitting EOB with min. assist. Shows poor BLE strength (3/5) with all sensation intact. Able to perform sit to stand with min assist. given through glute and HHA. Required min. assist. with balance in sitting and standing. Able to return to supine with min. assist. but was dependent for scooting up in bed. Pt. son reports that pt. is interested in getting a hospital bed at home. Skilled therapy recommended to address deficits in functional mobility, endurance, and strength. D/C recommended to skilled nursing facility.  -SB (r) BL (t) SB (c)       Row Name 10/18/24 1012          Therapy Assessment/Plan (PT)    Patient/Family Therapy Goals Statement (PT) return home  -SB (r) BL (t) SB (c)     Rehab Potential (PT) fair  -SB (r) BL (t) SB (c)     Criteria for Skilled Interventions Met (PT) yes;meets criteria;skilled treatment is necessary  -SB (r) BL (t) SB (c)     Therapy Frequency (PT) daily  -SB (r) BL (t) SB (c)     Predicted Duration of Therapy Intervention (PT) Until D/C or goals met.  -SB (r) BL (t) SB (c)       Row Name 10/18/24 1012          Vital Signs    O2 Delivery Pre Treatment room air  -SB (r) BL (t) SB (c)      O2 Delivery Intra Treatment room air  -SB (r) BL (t) SB (c)     O2 Delivery Post Treatment room air  -SB (r) BL (t) SB (c)     Pre Patient Position Supine  -SB (r) BL (t) SB (c)     Intra Patient Position Standing  -SB (r) BL (t) SB (c)     Post Patient Position Supine  -SB (r) BL (t) SB (c)       Row Name 10/18/24 1012          Positioning and Restraints    Pre-Treatment Position in bed  -SB (r) BL (t) SB (c)     Post Treatment Position bed  -SB (r) BL (t) SB (c)     In Bed notified nsg;supine;call light within reach;encouraged to call for assist;with family/caregiver;legs elevated  -SB (r) BL (t) SB (c)               User Key  (r) = Recorded By, (t) = Taken By, (c) = Cosigned By      Initials Name Provider Type    SB Tita Diggs, PT DPT Physical Therapist    Jonel Sommers, EVERETT Student PT Student                   Outcome Measures       Row Name 10/18/24 0922 10/18/24 0823       How much help from another person do you currently need...    Turning from your back to your side while in flat bed without using bedrails? 3  -SB (r) BL (t) SB (c) 2  -KW    Moving from lying on back to sitting on the side of a flat bed without bedrails? 3  -SB (r) BL (t) SB (c) 1  -KW    Moving to and from a bed to a chair (including a wheelchair)? 2  -SB (r) BL (t) SB (c) 1  -KW    Standing up from a chair using your arms (e.g., wheelchair, bedside chair)? 2  -SB (r) BL (t) SB (c) 1  -KW    Climbing 3-5 steps with a railing? 1  -SB (r) BL (t) SB (c) 1  -KW    To walk in hospital room? 1  -SB (r) BL (t) SB (c) 1  -KW    AM-PAC 6 Clicks Score (PT) 12  -SB (r) BL (t) 7  -KW    Highest Level of Mobility Goal 4 --> Transfer to chair/commode  -SB (r) BL (t) 2 --> Bed activities/dependent transfer  -KW      Row Name 10/18/24 0922          Functional Assessment    Outcome Measure Options AM-PAC 6 Clicks Basic Mobility (PT)  -SB (r) BL (t) SB (c)               User Key  (r) = Recorded By, (t) = Taken By, (c) = Cosigned By      Initials  Name Provider Type    Pauly Malone, RN Registered Nurse    Tita Turner, PT DPT Physical Therapist    Jonel Sommers, EVEERTT Student PT Student                                 Physical Therapy Education       Title: PT OT SLP Therapies (In Progress)       Topic: Physical Therapy (In Progress)       Point: Mobility training (In Progress)       Learning Progress Summary            Patient Acceptance, E, NR by SB at 10/18/2024 1508    Comment: edu on wound vac, alarm system, pressure relief    Acceptance, E,D, VU,DU,NR by  at 10/18/2024 1020    Comment: Educated on body mechanics to perform functional mobility more easily. Educated on the importance of repositioning to prevent pressure injuries.   Family Acceptance, E,D, VU,DU,NR by BL at 10/18/2024 1020    Comment: Educated on body mechanics to perform functional mobility more easily. Educated on the importance of repositioning to prevent pressure injuries.                      Point: Body mechanics (In Progress)       Learning Progress Summary            Patient Acceptance, E, NR by SB at 10/18/2024 1508    Comment: edu on wound vac, alarm system, pressure relief    Acceptance, E,D, VU,DU,NR by  at 10/18/2024 1020    Comment: Educated on body mechanics to perform functional mobility more easily. Educated on the importance of repositioning to prevent pressure injuries.   Family Acceptance, E,D, VU,DU,NR by  at 10/18/2024 1020    Comment: Educated on body mechanics to perform functional mobility more easily. Educated on the importance of repositioning to prevent pressure injuries.                      Point: Precautions (In Progress)       Learning Progress Summary            Patient Acceptance, E, NR by SB at 10/18/2024 1508    Comment: edu on wound vac, alarm system, pressure relief    Acceptance, E,D, VU,DU,NR by  at 10/18/2024 1020    Comment: Educated on body mechanics to perform functional mobility more easily. Educated on the importance of  repositioning to prevent pressure injuries.   Family Acceptance, E,D, VU,DU,NR by  at 10/18/2024 1020    Comment: Educated on body mechanics to perform functional mobility more easily. Educated on the importance of repositioning to prevent pressure injuries.                                      User Key       Initials Effective Dates Name Provider Type Discipline    SB 07/11/23 -  Tita Diggs, PT DPT Physical Therapist PT    BL 08/21/24 -  Jonel Ann, PT Student PT Student PT                  PT Recommendation and Plan  Planned Therapy Interventions (PT): balance training, bed mobility training, home exercise program, patient/family education, transfer training, strengthening  Progress: no change  Outcome Evaluation: PT Eval complete. Oriented to person only. Presents in supine with wound vac on R hip and son at bed side. Able to come to sitting EOB with min. assist. Shows poor BLE strength (3/5) with all sensation intact. Able to perform sit to stand with min assist. given through glute and HHA. Required min. assist. with balance in sitting and standing. Able to return to supine with min. assist. but was dependent for scooting up in bed. Pt. son reports that pt. is interested in getting a hospital bed at home. Skilled therapy recommended to address deficits in functional mobility, endurance, and strength. D/C recommended to skilled nursing facility.     Time Calculation:         PT Charges       Row Name 10/18/24 1510 10/18/24 1021          Time Calculation    Start Time 1006  add 10 min for CR for a total of 94 min  -SB 0922  add 15 min for chart review.  -SB (r) BL (t) SB (c)     Stop Time 1130  -SB 1001  -SB (r) BL (t) SB (c)     Time Calculation (min) 84 min  -SB 39 min  -SB (r) BL (t)     PT Non-Billable Time (min) 14 min  -SB --     PT Received On 10/18/24  -SB 10/18/24  -SB (r) BL (t) SB (c)     PT Goal Re-Cert Due Date 10/28/24  -SB 10/28/24  -SB (r) BL (t) SB (c)        Time Calculation- PT    Total  Timed Code Minutes- PT 20 minute(s)  -SB --        Timed Charges    00440 - PT Therapeutic Activity Minutes 20  -SB --        Untimed Charges    PT Eval/Re-eval Minutes -- 54  -SB (r) BL (t) SB (c)        Total Minutes    Timed Charges Total Minutes 20  -SB --     Untimed Charges Total Minutes -- 54  -SB (r) BL (t)      Total Minutes 20  -SB 54  -SB (r) BL (t)               User Key  (r) = Recorded By, (t) = Taken By, (c) = Cosigned By      Initials Name Provider Type    Tita Turner, PT DPT Physical Therapist    Jonel Sommers, PT Student PT Student                      PT G-Codes  Outcome Measure Options: AM-PAC 6 Clicks Basic Mobility (PT)  AM-PAC 6 Clicks Score (PT): 12  PT Discharge Summary  Anticipated Discharge Disposition (PT): skilled nursing facility    Jonel Ann PT Student  10/18/2024

## 2024-10-18 NOTE — PROGRESS NOTES
LOS: 2 days   Patient Care Team:  Sukhi Pelayo MD as PCP - General  Sukhi Pelayo MD as PCP - Family Medicine  Sukhi Pelayo MD as Referring Physician (Internal Medicine)  Xavier Hough MD as Cardiologist (Cardiology)    Subjective  Patient doing well.  It appears pain is controlled.  Leukocytosis improving.  Wound VAC in place with good seal.    Objective:   Vital Signs  Temp:  [97.3 °F (36.3 °C)-98.3 °F (36.8 °C)] 98.3 °F (36.8 °C)  Heart Rate:  [56-72] 72  Resp:  [14-19] 16  BP: ()/(37-83) 104/56    Intake & Output (last 3 days)         10/15 0701  10/16 0700 10/16 0701  10/17 0700 10/17 0701  10/18 0700 10/18 0701  10/19 0700    P.O.   0     I.V. (mL/kg)   150 (3.1)     IV Piggyback  100 500     Total Intake(mL/kg)  100 (2.1) 650 (13.3)     Urine (mL/kg/hr)  1050 1600 (1.4)     Total Output  1050 1600     Net  -950 -950             Urine Unmeasured Occurrence  1 x 1 x             Physical Exam:     General Appearance:  Elderly, frail, evidence of dementia, in no acute distress   HEENT:   Normocephalic, atraumatic, EOMI, MMM   Lungs:     Equal chest rise bilaterally.  No increased work of breathing    Heart:    Regular rhythm and normal rate   Abdomen:  Soft, nontender, nondistended   Extremities:     Moves all extremities spontaneously, no peripheral edema   Results Review:     I reviewed the patient's new clinical results. Significant labs:   I reviewed the patient's new imaging results and agree with the interpretation.    Results from last 7 days   Lab Units 10/18/24  0517 10/17/24  0438 10/16/24  1549   WBC 10*3/mm3 10.39 11.32* 11.89*   HEMOGLOBIN g/dL 8.0* 8.6* 8.6*   HEMATOCRIT % 25.2* 26.6* 26.4*   PLATELETS 10*3/mm3 205 195 198        Results from last 7 days   Lab Units 10/18/24  0517 10/17/24  0438 10/16/24  1549   SODIUM mmol/L 138 136 135*   POTASSIUM mmol/L 3.5 3.5 3.8   CHLORIDE mmol/L 104 102 101   CO2 mmol/L 22.0 23.0 22.0   BUN mg/dL 35* 45* 55*   CREATININE  mg/dL 1.56* 1.72* 2.01*   CALCIUM mg/dL 8.9 8.2* 8.7   BILIRUBIN mg/dL 0.6 0.6 0.6   ALK PHOS U/L 131* 149* 171*   ALT (SGPT) U/L 339* 445* 565*   AST (SGOT) U/L 172* 243* 331*   GLUCOSE mg/dL 108* 92 119*       Assessment and plan:    Assessment & Plan       ARF (acute renal failure)    Anemia    Chronic constipation    Pressure ulcer, hip, right, unstageable    Infected wound    Abnormal LFTs    Advanced age    Acute urinary retention    Pneumonia due to human metapneumovirus    Fecal impaction      89-year-old female with past medical history dementia, hypertension, chronic constipation who presents today with 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  Now status post OR for debridement, wound VAC placement, and fecal disimpaction.     A wound VAC was placed in the OR.  The pressure ulcer was caused by abnormal positioning on that right hip secondary to pain in her left hip.  Because of this, the patient will require positioning of the body in ways not feasible with an ordinary bed in order to alleviate pain and aid in tissue healing.  Would recommend obtaining specialized hospital bed.    Wound extends up to bone with thin area of fascia overlying.  No evidence of osteomyelitis.  Wound VAC change at bedside today to ensure patient tolerates it well.  Appreciate case management help with home wound VAC and home health  Will set up follow-up in general surgery clinic for wound check in 1 month  Okay for discharge home from a wound standpoint  General Surgery will sign off, please call or page with any additional questions or concerns    Wade Alegria MD  10/18/24  08:32 CDT    Part of this note may be an electronic transcription/translation of spoken language to printed text using the Dragon Dictation System.

## 2024-10-19 LAB
ALBUMIN SERPL-MCNC: 2.8 G/DL (ref 3.5–5.2)
ALBUMIN/GLOB SERPL: 0.9 G/DL
ALP SERPL-CCNC: 143 U/L (ref 39–117)
ALT SERPL W P-5'-P-CCNC: 272 U/L (ref 1–33)
ANION GAP SERPL CALCULATED.3IONS-SCNC: 14 MMOL/L (ref 5–15)
AST SERPL-CCNC: 115 U/L (ref 1–32)
BILIRUB SERPL-MCNC: 0.3 MG/DL (ref 0–1.2)
BUN SERPL-MCNC: 30 MG/DL (ref 8–23)
BUN/CREAT SERPL: 20.8 (ref 7–25)
CALCIUM SPEC-SCNC: 8.3 MG/DL (ref 8.6–10.5)
CHLORIDE SERPL-SCNC: 100 MMOL/L (ref 98–107)
CK SERPL-CCNC: 26 U/L (ref 20–180)
CO2 SERPL-SCNC: 20 MMOL/L (ref 22–29)
CREAT SERPL-MCNC: 1.44 MG/DL (ref 0.57–1)
EGFRCR SERPLBLD CKD-EPI 2021: 35.3 ML/MIN/1.73
GLOBULIN UR ELPH-MCNC: 3.1 GM/DL
GLUCOSE SERPL-MCNC: 103 MG/DL (ref 65–99)
HCT VFR BLD AUTO: 25.4 % (ref 34–46.6)
HGB BLD-MCNC: 8.1 G/DL (ref 12–15.9)
POTASSIUM SERPL-SCNC: 3.1 MMOL/L (ref 3.5–5.2)
PROT SERPL-MCNC: 5.9 G/DL (ref 6–8.5)
SODIUM SERPL-SCNC: 134 MMOL/L (ref 136–145)

## 2024-10-19 PROCEDURE — 94799 UNLISTED PULMONARY SVC/PX: CPT

## 2024-10-19 PROCEDURE — 94664 DEMO&/EVAL PT USE INHALER: CPT

## 2024-10-19 PROCEDURE — 80053 COMPREHEN METABOLIC PANEL: CPT | Performed by: INTERNAL MEDICINE

## 2024-10-19 PROCEDURE — 94761 N-INVAS EAR/PLS OXIMETRY MLT: CPT

## 2024-10-19 PROCEDURE — 97530 THERAPEUTIC ACTIVITIES: CPT

## 2024-10-19 PROCEDURE — 82550 ASSAY OF CK (CPK): CPT | Performed by: INTERNAL MEDICINE

## 2024-10-19 PROCEDURE — 85014 HEMATOCRIT: CPT | Performed by: INTERNAL MEDICINE

## 2024-10-19 PROCEDURE — 85018 HEMOGLOBIN: CPT | Performed by: INTERNAL MEDICINE

## 2024-10-19 RX ORDER — POTASSIUM CHLORIDE 750 MG/1
40 CAPSULE, EXTENDED RELEASE ORAL ONCE
Status: COMPLETED | OUTPATIENT
Start: 2024-10-19 | End: 2024-10-19

## 2024-10-19 RX ADMIN — Medication 1 TABLET: at 09:30

## 2024-10-19 RX ADMIN — AMOXICILLIN 500 MG: 500 CAPSULE ORAL at 09:29

## 2024-10-19 RX ADMIN — DOCUSATE SODIUM 50 MG AND SENNOSIDES 8.6 MG 2 TABLET: 8.6; 5 TABLET, FILM COATED ORAL at 09:29

## 2024-10-19 RX ADMIN — IPRATROPIUM BROMIDE AND ALBUTEROL SULFATE 3 ML: .5; 3 SOLUTION RESPIRATORY (INHALATION) at 18:58

## 2024-10-19 RX ADMIN — IPRATROPIUM BROMIDE AND ALBUTEROL SULFATE 3 ML: .5; 3 SOLUTION RESPIRATORY (INHALATION) at 07:10

## 2024-10-19 RX ADMIN — BISACODYL 5 MG: 5 TABLET, COATED ORAL at 09:29

## 2024-10-19 RX ADMIN — AMOXICILLIN 500 MG: 500 CAPSULE ORAL at 20:09

## 2024-10-19 RX ADMIN — Medication 10 ML: at 09:33

## 2024-10-19 RX ADMIN — POTASSIUM CHLORIDE 40 MEQ: 750 CAPSULE, EXTENDED RELEASE ORAL at 09:30

## 2024-10-19 RX ADMIN — LEVOTHYROXINE SODIUM 75 MCG: 0.07 TABLET ORAL at 06:16

## 2024-10-19 RX ADMIN — CITALOPRAM HYDROBROMIDE 20 MG: 20 TABLET, FILM COATED ORAL at 09:29

## 2024-10-19 RX ADMIN — POLYETHYLENE GLYCOL 3350 17 G: 17 POWDER, FOR SOLUTION ORAL at 09:31

## 2024-10-19 RX ADMIN — Medication 10 ML: at 20:10

## 2024-10-19 NOTE — PAYOR COMM NOTE
"10/19 CLINICAL   [id number]    Beronica Clifton (89 y.o. Female)       Date of Birth   1935    Social Security Number       Address   [address] [place] [address]    Home Phone   [phone]    MRN   [id number]       [place]   Catholic    Marital Status                               Admission Date   10/16/24    Admission Type   Emergency    Admitting Provider   Xavier Collins DO    Attending Provider   Xavier Collins DO    Department, Room/Bed   [facility] 3C, 391/1       Discharge Date       Discharge Disposition       Discharge Destination                                 Attending Provider: Xavier Collins DO    Allergies: Codeine, Morphine And Codeine, Norflex [Orphenadrine Citrate], Stadol [Butorphanol]    Isolation: Contact   Infection: Human Metapneumovirus  (10/16/24)   Code Status: No CPR    Ht: 160 cm (62.99\")   Wt: 48.7 kg (107 lb 5.8 oz)    Admission Cmt: None   Principal Problem: ARF (acute renal failure) [N17.9]                   Active Insurance as of 10/16/2024       Primary Coverage       Payor Plan Insurance Group Employer/Plan Group    ANTHEM MEDICARE REPLACEMENT ANTHEM MEDICARE ADVANTAGE [id number]       Payor Plan Address Payor Plan Phone Number Payor Plan Fax Number Effective Dates    [address] [phone]  1/1/2016 - None Entered    [facility] [id number]         Subscriber Name Subscriber Birth Date Member ID       BERONICA CLIFTON 1935 [id number]               Secondary Coverage       Payor Plan Insurance Group Employer/Plan Group     FOR LIFE  FOR LIFE  SUP         Payor Plan Address Payor Plan Phone Number Payor Plan Fax Number Effective Dates    [address] [phone]  11/22/2016 - None Entered    [facility] [id number]         Subscriber Name Subscriber Birth Date Member ID       BERONICA CLIFTON E 1935 [id number]                     Emergency Contacts        (Rel.) Home Phone Work Phone Mobile " Phone    Edwar Clifton (Son) -- -- [phone]    Meeta Haile (Daughter) [phone] -- [phone]              Current Facility-Administered Medications   Medication Dose Route Frequency Provider Last Rate Last Admin    acetaminophen (TYLENOL) tablet 650 mg  650 mg Oral Q6H PRN Wade Alegria MD   650 mg at 10/18/24 2026    aluminum-magnesium hydroxide-simethicone (MAALOX MAX) 400-400-40 MG/5ML suspension 15 mL  15 mL Oral Q6H PRN Wade Alegria MD        amoxicillin (AMOXIL) capsule 500 mg  500 mg Oral Q12H KarinaXavier wesley F, DO   500 mg at 10/19/24 0929    sennosides-docusate (PERICOLACE) 8.6-50 MG per tablet 2 tablet  2 tablet Oral BID Wade Alegria MD   2 tablet at 10/19/24 0929    And    polyethylene glycol (MIRALAX) packet 17 g  17 g Oral Daily Wade Alegria MD   17 g at 10/19/24 0931    And    bisacodyl (DULCOLAX) EC tablet 5 mg  5 mg Oral Daily Wade Alegria MD   5 mg at 10/19/24 0929    And    bisacodyl (DULCOLAX) suppository 10 mg  10 mg Rectal Daily Wade Alegria MD   10 mg at 10/18/24 0823    citalopram (CeleXA) tablet 20 mg  20 mg Oral Daily Wade Alegria MD   20 mg at 10/19/24 0929    [Held by provider] clopidogrel (PLAVIX) tablet 75 mg  75 mg Oral Daily Wade Alegria MD        ipratropium-albuterol (DUO-NEB) nebulizer solution 3 mL  3 mL Nebulization BID - RT Wade Alegria MD   3 mL at 10/19/24 0710    levothyroxine (SYNTHROID, LEVOTHROID) tablet 75 mcg  75 mcg Oral Q AM Wade Alegria MD   75 mcg at 10/19/24 0616    multivitamin with minerals 1 tablet  1 tablet Oral Daily Wade Alegria MD   1 tablet at 10/19/24 0930    ondansetron (ZOFRAN) injection 4 mg  4 mg Intravenous Q6H PRN Wade Alegria MD        sodium chloride 0.9 % flush 10 mL  10 mL Intravenous Q12H Wade Alegria MD   10 mL at 10/19/24 0933    sodium chloride 0.9 % flush 10 mL  10 mL Intravenous PRN Wade Alegria MD         Orders (last 24 hrs)        Start     Ordered    10/20/24 0600  Comprehensive Metabolic Panel  Morning  "Draw         10/19/24 1222    10/20/24 0600  Magnesium  Morning Draw         10/19/24 1222    10/19/24 0900  potassium chloride (MICRO-K/KLOR-CON) CR capsule  Once         10/19/24 0812    10/19/24 0600  Comprehensive Metabolic Panel  Morning Draw         10/18/24 1708    10/19/24 0600  CK  Morning Draw         10/18/24 1708    10/19/24 0600  Hemoglobin & Hematocrit, Blood  Morning Draw         10/18/24 1708    10/18/24 2100  amoxicillin (AMOXIL) capsule 500 mg  Every 12 Hours Scheduled         10/18/24 1237    10/18/24 2000  Vancomycin, Trough  Timed,   Status:  Canceled         10/17/24 1528    10/18/24 0900  polyethylene glycol (MIRALAX) packet 17 g  Daily        Placed in \"And\" Linked Group    10/17/24 1021    10/18/24 0900  bisacodyl (DULCOLAX) EC tablet 5 mg  Daily        Placed in \"And\" Linked Group    10/17/24 1021    10/17/24 2100  sennosides-docusate (PERICOLACE) 8.6-50 MG per tablet 2 tablet  2 Times Daily        Placed in \"And\" Linked Group    10/17/24 1021    10/17/24 1800  Dietary Nutrition Supplements Magic Cup  Daily With Lunch & Dinner       10/17/24 1557    10/17/24 1400  Incentive Spirometry  Every 4 Hours While Awake       10/17/24 1012    10/17/24 1300  bisacodyl (DULCOLAX) suppository 10 mg  Daily        Placed in \"And\" Linked Group    10/17/24 1021    10/17/24 1100  ipratropium-albuterol (DUO-NEB) nebulizer solution 3 mL  2 Times Daily - RT         10/17/24 1012    10/17/24 0952  Silicone Border Dressing to Bony Prominences  Every Shift      Comments: Apply silicone border foam dressing per protocol to sacral spine/bilateral heels for protection.  Nursing to change dressing every 3 days and PRN if soiled. Nursing is to peel back dressing with every assessment to assess skin underneath dressing. No barrier cream under dressing.    10/17/24 0952    10/17/24 0900  citalopram (CeleXA) tablet 20 mg  Daily         10/16/24 1950    10/17/24 0800  Oral Care  2 Times Daily       10/16/24 1950    " 10/17/24 0600  levothyroxine (SYNTHROID, LEVOTHROID) tablet 75 mcg  Every Early Morning         10/16/24 1950    10/16/24 2100  sodium chloride 0.9 % flush 10 mL  Every 12 Hours Scheduled         10/16/24 1950    10/16/24 2006  [Held by provider]  clopidogrel (PLAVIX) tablet 75 mg  Daily        (On hold since Wed 10/16/2024 at 1950 until manually unheld; held by Gilbert Palmer MDHold Reason: Other (Comment Required))    10/16/24 1950    10/16/24 2006  multivitamin with minerals 1 tablet  Daily         10/16/24 1950    10/16/24 2000  Vital Signs  Every 4 Hours       10/16/24 1950    10/16/24 1950  Intake & Output  Every Shift       10/16/24 1950    10/16/24 1950  sodium chloride 0.9 % flush 10 mL  As Needed         10/16/24 1950    10/16/24 1950  acetaminophen (TYLENOL) tablet 650 mg  Every 6 Hours PRN         10/16/24 1950    10/16/24 1950  ondansetron (ZOFRAN) injection 4 mg  Every 6 Hours PRN         10/16/24 1950    10/16/24 1950  aluminum-magnesium hydroxide-simethicone (MAALOX MAX) 400-400-40 MG/5ML suspension 15 mL  Every 6 Hours PRN         10/16/24 1950    Unscheduled  Wound Care  As Needed       10/17/24 0952    --  pantoprazole (PROTONIX) 40 MG EC tablet  Daily         10/17/24 1506    --  ondansetron ODT (ZOFRAN-ODT) 4 MG disintegrating tablet  Every 8 Hours PRN         10/17/24 1507    --  potassium chloride 10 MEQ CR tablet  Daily         10/17/24 1507    --  spironolactone (ALDACTONE) 25 MG tablet  Daily         10/17/24 1507                     Operative/Procedure Notes (all)        Wade Alegria MD at 10/17/24 1038  Version 1 of 1         DEBRIDEMENT WOUND  Procedure Note    Sofía Clifton  10/17/2024    Pre-op Diagnosis:   Pressure ulcer, hip, right, unstageable [L89.210]    Post-op Diagnosis:     Post-Op Diagnosis Codes:     * Pressure ulcer, hip, right, unstageable [L89.210]    Procedure/CPT® Codes:      Procedure(s):  DEBRIDEMENT OF RIGHT HIP WOUND, WOUND VAC PLACEMENT, FECAL  DISIMPACTION    Surgeon(s):  Wade Alegria MD        Anesthesia: Choice    Staff:       Estimated Blood Loss: minimal    Specimens:                ID Type Source Tests Collected by Time   1 : ANAEROBIC AND AEROBIC CULTURE OF RIGHT HIP WOUND Surgical Site Hip, Right ANAEROBIC CULTURE, WOUND CULTURE Wade Alegria MD 10/17/2024 1045         Drains:   External Urinary Catheter (Active)   Daily Indications Daily output 10/17/24 1215   Site Assessment Clean;Skin intact 10/17/24 1215   Output (mL) 300 mL 10/17/24 0628       Indications: 89-year-old female with right iliac crest pressure ulcer    Findings: Right iliac crest pressure ulcer approximately 7 cm x 8 cm.  Bone palpated at the base of the wound.  Small amount of fascia overlying bone.  No evidence of necrosis into the bone.  Wound VAC placed.  Fecal disimpaction completed.    Complications: None    Procedure: Prior to the operation the patient was evaluated in the family was explained risk benefits and alternatives of the procedure.  The family stated their understanding and consent was signed.  The patient was then met by the anesthesia care team and taken to the operating room where she was placed right side up on the operating table after undergoing general endotracheal anesthesia.  She had proper padding of all extremities.  She was prepped and draped in usual sterile fashion.  A timeout was performed to confirm correct patient, procedure, location.    The wound was inspected and noted to be approximately 7 cm x 8 cm x 4 cm.  She was noted to have a large area of phlegmon with necrotic tissue underlying.  This tissue was resected and necrotic tissue was debrided.  This was completed with a combination of electrocautery and sharp dissection.  The surrounding questionable skin was debrided.  The depth of the wound was approximately 4 cm.  Irrigation was then used to washout the wound.  Hemostasis was obtained.  A wound VAC was put in place.    The patient was  then rolled and fecal disimpaction was completed.  A red rubber was placed within her rectum with significant amount of output.  The patient was cleaned and rotated back.  Sponge needle instrument counts were correct at the completion of the case.  Patient tolerated procedure well and was awoken from general anesthesia.  She recovered in the PACU without any complications and was transferred to the floor.      Wade Alegria MD     Date: 10/17/2024  Time: 14:29 CDT    Part of this note may be an electronic transcription/translation of spoken language to printed text using the Dragon Dictation System.     Electronically signed by Wade Alegria MD at 10/17/24 1441          Physician Progress Notes (last 48 hours)        Xavier Collins DO at 10/19/24 0813              [facility] Medicine Services  INPATIENT PROGRESS NOTE    Patient Name: Sofía Clifton  Date of Admission: 10/16/2024  Today's Date: 10/19/24  Length of Stay: 3  Primary Care Physician: Sukhi Pelayo MD    Subjective   Chief Complaint: f/u wound/infection    HPI   Patient seen resting in bed.  Daughter at bedside.  No significant adverse events overnight.  Patient's interactive.  Denies any acute complaints.  No shortness of breath, chest pain, nausea.  Tolerating p.o.  Afebrile.      Review of Systems   All pertinent negatives and positives are as above. All other systems have been reviewed and are negative unless otherwise stated.     Objective    Temp:  [97.8 °F (36.6 °C)-98.6 °F (37 °C)] 98 °F (36.7 °C)  Heart Rate:  [69-77] 77  Resp:  [18] 18  BP: ()/(40-66) 141/66  Physical Exam  GEN: Awake, alert, interactive, in NAD  HEENT:  PERRLA, EOMI, Anicteric, Trachea midline  Lungs: no wheezing/rales/rhonchi  Heart: RRR, +S1/s2, no rub  ABD: soft, nt/nd, +BS, no guarding/rebound  Extremities: no cyanosis, no pitting edema  Skin: wound vac in place to R hip/iliac crest  Neuro: HAMILTON, no obvious focal deficits      Results Review:  I have reviewed the labs, radiology results, and diagnostic studies.    Laboratory Data:   Results from last 7 days   Lab Units 10/19/24  0301 10/18/24  0517 10/17/24  0438 10/16/24  1549   WBC 10*3/mm3  --  10.39 11.32* 11.89*   HEMOGLOBIN g/dL 8.1* 8.0* 8.6* 8.6*   HEMATOCRIT % 25.4* 25.2* 26.6* 26.4*   PLATELETS 10*3/mm3  --  205 195 198        Results from last 7 days   Lab Units 10/19/24  0301 10/18/24  0517 10/17/24  0438   SODIUM mmol/L 134* 138 136   POTASSIUM mmol/L 3.1* 3.5 3.5   CHLORIDE mmol/L 100 104 102   CO2 mmol/L 20.0* 22.0 23.0   BUN mg/dL 30* 35* 45*   CREATININE mg/dL 1.44* 1.56* 1.72*   CALCIUM mg/dL 8.3* 8.9 8.2*   BILIRUBIN mg/dL 0.3 0.6 0.6   ALK PHOS U/L 143* 131* 149*   ALT (SGPT) U/L 272* 339* 445*   AST (SGOT) U/L 115* 172* 243*   GLUCOSE mg/dL 103* 108* 92       Culture Data:   Blood Culture   Date Value Ref Range Status   10/16/2024 No growth at 24 hours  Preliminary   10/16/2024 No growth at 24 hours  Preliminary     Wound Culture   Date Value Ref Range Status   10/17/2024 Scant growth (1+) Enterococcus faecalis (A)  Final   10/16/2024 Light growth (2+) Enterococcus faecalis (A)  Final       Radiology Data:   Imaging Results (Last 24 Hours)       ** No results found for the last 24 hours. **            I have reviewed the patient's current medications.     Assessment/Plan   Assessment  Active Hospital Problems    Diagnosis     **ARF (acute renal failure)     Moderate malnutrition     Fecal impaction     Pressure ulcer, hip, right, unstageable     Infected wound     Abnormal LFTs     Advanced age     Acute urinary retention     Pneumonia due to human metapneumovirus     Chronic constipation     Anemia        Treatment Plan  #1 ANTONIO - continues to improve slowly, ivf now off, encourage po intake. Creat down to 1.4 and gfr 35. Avoid nephrotoxic meds.    #2 Pressure wound/infection - s/p OR yesterday for debridement and washout. No signs of osteo. Wound vac in place.  Cultures growing Enterococcus faecalis.  Vanco and Zosyn changed to amoxicillin 10/18.     #3 human metapneumovirus -supportive care.  On room air.    #4 fecal impaction -status post BM.  Continue bowel regimen    #5 transaminitis -trending down.  Continue supportive care.  Unclear initial etiology?  CK level normal.  May have had mild rhabdo given degree of tissue wound/infection on arrival which has resolved.  Patient is not on statin    #6 chronic anemia -H&H fairly stable.  No signs of obvious bleeding.    Medical Decision Making  Number and Complexity of problems: 4-5 acute, multiple chronic  Differential Diagnosis: as above    Conditions and Status        Stable, interactive, does not appear toxic     MDM Data  External documents reviewed: none  Cardiac tracing (EKG, telemetry) interpretation: none  Radiology interpretation: reports reviewed  Labs reviewed: as above  Any tests that were considered but not ordered: none     Decision rules/scores evaluated (example [id number], Wells, etc): none     Discussed with: patient, Dr. Alegria,      Care Planning  Shared decision making: Patient apprised of current labs, vitals, imaging and treatment plan.  They are agreeable with proceeding with plans as discussed.    Code status and discussions: dnr/dni    Disposition  Social Determinants of Health that impact treatment or disposition: none  I expect the patient to be discharged to likely home w/ HHS.  Therapies recommending STR/SNF but family seems more apt to bring patient home.  Needs woundvac and specialty mattress if going home.  Social work team is working on orders/paperwork.    Electronically signed by Xavier Collins DO, 10/19/24, 08:13 CDT.      Electronically signed by Xavier Collins DO at 10/19/24 1221       Xavier Collins DO at 10/18/24 1230              [facility] Medicine Services  INPATIENT PROGRESS NOTE    Patient Name: Sofía Clifton  Date of Admission:  10/16/2024  Today's Date: 10/18/24  Length of Stay: 2  Primary Care Physician: Sukhi Pelayo MD    Subjective   Chief Complaint: f/u wound/infection    HPI   Patient resting comfortably in bed.  Wound VAC in place.  No complaints.  Denies chest pain.  Denies nausea or shortness of breath.  Tolerating p.o. per staff.      Review of Systems   All pertinent negatives and positives are as above. All other systems have been reviewed and are negative unless otherwise stated.     Objective    Temp:  [97.4 °F (36.3 °C)-98.3 °F (36.8 °C)] 98.2 °F (36.8 °C)  Heart Rate:  [63-72] 72  Resp:  [14-18] 18  BP: ()/(43-64) 128/64  Physical Exam  GEN: Awake, alert, interactive, in NAD  HEENT:  PERRLA, EOMI, Anicteric, Trachea midline  Lungs: no wheezing/rales/rhonchi  Heart: RRR, +S1/s2, no rub  ABD: soft, nt/nd, +BS, no guarding/rebound  Extremities: no cyanosis, no pitting edema  Skin: wound vac in place to R hip/iliac crest  Neuro: HAMILTON, no obvious focal deficits     Results Review:  I have reviewed the labs, radiology results, and diagnostic studies.    Laboratory Data:   Results from last 7 days   Lab Units 10/18/24  0517 10/17/24  0438 10/16/24  1549   WBC 10*3/mm3 10.39 11.32* 11.89*   HEMOGLOBIN g/dL 8.0* 8.6* 8.6*   HEMATOCRIT % 25.2* 26.6* 26.4*   PLATELETS 10*3/mm3 205 195 198        Results from last 7 days   Lab Units 10/18/24  0517 10/17/24  0438 10/16/24  1549   SODIUM mmol/L 138 136 135*   POTASSIUM mmol/L 3.5 3.5 3.8   CHLORIDE mmol/L 104 102 101   CO2 mmol/L 22.0 23.0 22.0   BUN mg/dL 35* 45* 55*   CREATININE mg/dL 1.56* 1.72* 2.01*   CALCIUM mg/dL 8.9 8.2* 8.7   BILIRUBIN mg/dL 0.6 0.6 0.6   ALK PHOS U/L 131* 149* 171*   ALT (SGPT) U/L 339* 445* 565*   AST (SGOT) U/L 172* 243* 331*   GLUCOSE mg/dL 108* 92 119*       Culture Data:   Blood Culture   Date Value Ref Range Status   10/16/2024 No growth at 24 hours  Preliminary   10/16/2024 No growth at 24 hours  Preliminary     Wound Culture   Date Value Ref  Range Status   10/17/2024 Scant growth (1+) Enterococcus faecalis (A)  Final   10/16/2024 Light growth (2+) Enterococcus faecalis (A)  Final       Radiology Data:   Imaging Results (Last 24 Hours)       ** No results found for the last 24 hours. **            I have reviewed the patient's current medications.     Assessment/Plan   Assessment  Active Hospital Problems    Diagnosis     **ARF (acute renal failure)     Moderate malnutrition     Fecal impaction     Pressure ulcer, hip, right, unstageable     Infected wound     Abnormal LFTs     Advanced age     Acute urinary retention     Pneumonia due to human metapneumovirus     Chronic constipation     Anemia        Treatment Plan  #1 ANTONIO - continues to improve slowly, ivf now off, encourage po intake. Creat down to 1.5 and gfr 32. Avoid nephrotoxic meds. D/c vanco/zosyn.     #2 Pressure wound/infection - s/p OR yesterday for debridement and washout. No signs of osteo. Wound vac in place. Cultures growing Enterococcus faecalis.  DC Vanco and Zosyn.  Start amoxicillin    #3 human metapneumovirus -supportive care.  On room air.    #4 fecal impaction -status post BM.  Continue bowel regimen    #5 transaminitis -trending down.  Continue supportive care.  Unclear initial etiology?  Check CK level.  Patient is not on statin    #6 chronic anemia -H&H fairly stable.  No signs of obvious bleeding.    Medical Decision Making  Number and Complexity of problems: 4-5 acute, multiple chronic  Differential Diagnosis: as above    Conditions and Status        New to me, interactive, does not appear toxic     MDM Data  External documents reviewed: none  Cardiac tracing (EKG, telemetry) interpretation: none  Radiology interpretation: reports reviewed  Labs reviewed: as above  Any tests that were considered but not ordered: none     Decision rules/scores evaluated (example [id number], Wells, etc): none     Discussed with: patient, Dr. Alegria,      Care Planning  Shared decision  making: Patient apprised of current labs, vitals, imaging and treatment plan.  They are agreeable with proceeding with plans as discussed.    Code status and discussions: dnr/dni    Disposition  Social Determinants of Health that impact treatment or disposition: none  I expect the patient to be discharged to ? Home w/ HHS vs SNF. Needs woundvac and specialty mattress if going home.     Electronically signed by Xavier Collins DO, 10/18/24, 12:30 CDT.      Electronically signed by Xavier Collins DO at 10/18/24 6667       Wade Alegria MD at 10/18/24 0848           LOS: 2 days   Patient Care Team:  Sukhi Pelayo MD as PCP - General  Sukhi Pelayo MD as PCP - Family Medicine  Sukhi Pelayo MD as Referring Physician (Internal Medicine)  Xavier Hough MD as Cardiologist (Cardiology)    Subjective  Patient doing well.  It appears pain is controlled.  Leukocytosis improving.  Wound VAC in place with good seal.    Objective:   Vital Signs  Temp:  [97.3 °F (36.3 °C)-98.3 °F (36.8 °C)] 98.3 °F (36.8 °C)  Heart Rate:  [56-72] 72  Resp:  [14-19] 16  BP: ()/(37-83) 104/56    Intake & Output (last 3 days)         10/15 0701  10/16 0700 10/16 0701  10/17 0700 10/17 0701  10/18 0700 10/18 0701  10/19 0700    P.O.   0     I.V. (mL/kg)   150 (3.1)     IV Piggyback  100 500     Total Intake(mL/kg)  100 (2.1) 650 (13.3)     Urine (mL/kg/hr)  1050 1600 (1.4)     Total Output  1050 1600     Net  -950 -950             Urine Unmeasured Occurrence  1 x 1 x             Physical Exam:     General Appearance:  Elderly, frail, evidence of dementia, in no acute distress   HEENT:   Normocephalic, atraumatic, EOMI, MMM   Lungs:     Equal chest rise bilaterally.  No increased work of breathing    Heart:    Regular rhythm and normal rate   Abdomen:  Soft, nontender, nondistended   Extremities:     Moves all extremities spontaneously, no peripheral edema   Results Review:     I reviewed the patient's new clinical  results. Significant labs:   I reviewed the patient's new imaging results and agree with the interpretation.    Results from last 7 days   Lab Units 10/18/24  0517 10/17/24  0438 10/16/24  1549   WBC 10*3/mm3 10.39 11.32* 11.89*   HEMOGLOBIN g/dL 8.0* 8.6* 8.6*   HEMATOCRIT % 25.2* 26.6* 26.4*   PLATELETS 10*3/mm3 205 195 198        Results from last 7 days   Lab Units 10/18/24  0517 10/17/24  0438 10/16/24  1549   SODIUM mmol/L 138 136 135*   POTASSIUM mmol/L 3.5 3.5 3.8   CHLORIDE mmol/L 104 102 101   CO2 mmol/L 22.0 23.0 22.0   BUN mg/dL 35* 45* 55*   CREATININE mg/dL 1.56* 1.72* 2.01*   CALCIUM mg/dL 8.9 8.2* 8.7   BILIRUBIN mg/dL 0.6 0.6 0.6   ALK PHOS U/L 131* 149* 171*   ALT (SGPT) U/L 339* 445* 565*   AST (SGOT) U/L 172* 243* 331*   GLUCOSE mg/dL 108* 92 119*       Assessment and plan:    Assessment & Plan       ARF (acute renal failure)    Anemia    Chronic constipation    Pressure ulcer, hip, right, unstageable    Infected wound    Abnormal LFTs    Advanced age    Acute urinary retention    Pneumonia due to human metapneumovirus    Fecal impaction      89-year-old female with past medical history dementia, hypertension, chronic constipation who presents today with 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  Now status post OR for debridement, wound VAC placement, and fecal disimpaction.     A wound VAC was placed in the OR.  The pressure ulcer was caused by abnormal positioning on that right hip secondary to pain in her left hip.  Because of this, the patient will require positioning of the body in ways not feasible with an ordinary bed in order to alleviate pain and aid in tissue healing.  Would recommend obtaining specialized hospital bed.    Wound extends up to bone with thin area of fascia overlying.  No evidence of osteomyelitis.  Wound VAC change at bedside today to ensure patient tolerates it well.  Appreciate case management help with home wound VAC and home health  Will set  up follow-up in general surgery clinic for wound check in 1 month  Okay for discharge home from a wound standpoint  General Surgery will sign off, please call or page with any additional questions or concerns    Wade Alegria MD  10/18/24  08:32 CDT    Part of this note may be an electronic transcription/translation of spoken language to printed text using the Dragon Dictation System.     Electronically signed by Wade Alegria MD at 10/19/24 1313       Consult Notes (last 48 hours)  Notes from 10/17/24 1658 through 10/19/24 1658   No notes of this type exist for this encounter.

## 2024-10-19 NOTE — PLAN OF CARE
Goal Outcome Evaluation:  Plan of Care Reviewed With: patient        Progress: no change  Outcome Evaluation: bed mobility mod/max 1. dementia at baseline sat EOB working on sitting balance pt leaning R. pt able to hold self in sitting briefly then lean back to right. cont to work on. Family reports pt has amb in several months only able to stand and transfer. Son help to stand pt mod x2 upon standing pt had BM had to sit back down and lay down to get pt clean up rolling L-R

## 2024-10-19 NOTE — PROGRESS NOTES
[facility] Medicine Services  INPATIENT PROGRESS NOTE    Patient Name: Sofía Clifton  Date of Admission: 10/16/2024  Today's Date: 10/19/24  Length of Stay: 3  Primary Care Physician: Sukhi Pelayo MD    Subjective   Chief Complaint: f/u wound/infection    HPI   Patient seen resting in bed.  Daughter at bedside.  No significant adverse events overnight.  Patient's interactive.  Denies any acute complaints.  No shortness of breath, chest pain, nausea.  Tolerating p.o.  Afebrile.      Review of Systems   All pertinent negatives and positives are as above. All other systems have been reviewed and are negative unless otherwise stated.     Objective    Temp:  [97.8 °F (36.6 °C)-98.6 °F (37 °C)] 98 °F (36.7 °C)  Heart Rate:  [69-77] 77  Resp:  [18] 18  BP: ()/(40-66) 141/66  Physical Exam  GEN: Awake, alert, interactive, in NAD  HEENT:  PERRLA, EOMI, Anicteric, Trachea midline  Lungs: no wheezing/rales/rhonchi  Heart: RRR, +S1/s2, no rub  ABD: soft, nt/nd, +BS, no guarding/rebound  Extremities: no cyanosis, no pitting edema  Skin: wound vac in place to R hip/iliac crest  Neuro: HAMILTON, no obvious focal deficits     Results Review:  I have reviewed the labs, radiology results, and diagnostic studies.    Laboratory Data:   Results from last 7 days   Lab Units 10/19/24  0301 10/18/24  0517 10/17/24  0438 10/16/24  1549   WBC 10*3/mm3  --  10.39 11.32* 11.89*   HEMOGLOBIN g/dL 8.1* 8.0* 8.6* 8.6*   HEMATOCRIT % 25.4* 25.2* 26.6* 26.4*   PLATELETS 10*3/mm3  --  205 195 198        Results from last 7 days   Lab Units 10/19/24  0301 10/18/24  0517 10/17/24  0438   SODIUM mmol/L 134* 138 136   POTASSIUM mmol/L 3.1* 3.5 3.5   CHLORIDE mmol/L 100 104 102   CO2 mmol/L 20.0* 22.0 23.0   BUN mg/dL 30* 35* 45*   CREATININE mg/dL 1.44* 1.56* 1.72*   CALCIUM mg/dL 8.3* 8.9 8.2*   BILIRUBIN mg/dL 0.3 0.6 0.6   ALK PHOS U/L 143* 131* 149*   ALT (SGPT) U/L 272* 339* 445*   AST (SGOT) U/L 115*  172* 243*   GLUCOSE mg/dL 103* 108* 92       Culture Data:   Blood Culture   Date Value Ref Range Status   10/16/2024 No growth at 24 hours  Preliminary   10/16/2024 No growth at 24 hours  Preliminary     Wound Culture   Date Value Ref Range Status   10/17/2024 Scant growth (1+) Enterococcus faecalis (A)  Final   10/16/2024 Light growth (2+) Enterococcus faecalis (A)  Final       Radiology Data:   Imaging Results (Last 24 Hours)       ** No results found for the last 24 hours. **            I have reviewed the patient's current medications.     Assessment/Plan   Assessment  Active Hospital Problems    Diagnosis     **ARF (acute renal failure)     Moderate malnutrition     Fecal impaction     Pressure ulcer, hip, right, unstageable     Infected wound     Abnormal LFTs     Advanced age     Acute urinary retention     Pneumonia due to human metapneumovirus     Chronic constipation     Anemia        Treatment Plan  #1 ANTONIO - continues to improve slowly, ivf now off, encourage po intake. Creat down to 1.4 and gfr 35. Avoid nephrotoxic meds.    #2 Pressure wound/infection - s/p OR 10/17 for debridement and washout. No signs of osteo. Wound vac in place. Cultures growing Enterococcus faecalis.  Vanco and Zosyn changed to amoxicillin 10/18.     #3 human metapneumovirus -supportive care.  On room air.    #4 fecal impaction -status post BM.  Continue bowel regimen    #5 transaminitis -trending down.  Continue supportive care.  Unclear initial etiology?  CK level normal.  May have had mild rhabdo given degree of tissue wound/infection on arrival which has resolved.  Patient is not on statin    #6 chronic anemia -H&H fairly stable.  No signs of obvious bleeding.    Medical Decision Making  Number and Complexity of problems: 4-5 acute, multiple chronic  Differential Diagnosis: as above    Conditions and Status        Stable, interactive, does not appear toxic     MDM Data  External documents reviewed: none  Cardiac tracing (EKG,  telemetry) interpretation: none  Radiology interpretation: reports reviewed  Labs reviewed: as above  Any tests that were considered but not ordered: none     Decision rules/scores evaluated (example [id number], Wells, etc): none     Discussed with: patient, Dr. Alegria,      Care Planning  Shared decision making: Patient apprised of current labs, vitals, imaging and treatment plan.  They are agreeable with proceeding with plans as discussed.    Code status and discussions: dnr/dni    Disposition  Social Determinants of Health that impact treatment or disposition: none  I expect the patient to be discharged to likely home w/ HHS.  Therapies recommending STR/SNF but family seems more apt to bring patient home.  Needs woundvac and specialty mattress if going home.  Social work team is working on orders/paperwork.    Electronically signed by Xavier Collins DO, 10/19/24, 08:13 CDT.

## 2024-10-19 NOTE — THERAPY TREATMENT NOTE
Acute Care - Physical Therapy Treatment Note  [facility]     Patient Name: Sofía Clifton  : 1935  MRN: [id number]  Today's Date: 10/19/2024   Onset of Illness/Injury or Date of Surgery: 10/17/24  Visit Dx:     ICD-10-CM ICD-9-CM   1. Acute renal failure, unspecified acute renal failure type  N17.9 584.9   2. Cellulitis of hip  L03.119 682.6   3. Pneumonia due to human metapneumovirus (hMPV)  J12.3 480.8   4. Pressure ulcer, hip, right, unstageable  L89.210 707.04     707.25   5. Impaired mobility [Z74.09]  Z74.09 799.89   6. Open wound [T14.8XXA]  T14.8XXA 879.8     Patient Active Problem List   Diagnosis    Paroxysmal atrial fibrillation    Anemia    Benign essential hypertension    Gastroesophageal reflux disease with esophagitis    Diverticulitis of colon    Chronic constipation    Osteoarthritis    Degeneration of lumbar intervertebral disc    Hypercholesteremia    Pericardial cyst    Chest pain    Melena    Hypothyroidism (acquired)    Acute blood loss anemia - requiring transfusion    PAD (peripheral artery disease)    RLS (restless legs syndrome)    ARF (acute renal failure)    Pressure ulcer, hip, right, unstageable    Infected wound    Abnormal LFTs    Advanced age    Acute urinary retention    Pneumonia due to human metapneumovirus    Fecal impaction    Moderate malnutrition     Past Medical History:   Diagnosis Date    Anemia 2016    Benign essential hypertension 2016    Chronic constipation 2016    Degeneration of lumbar intervertebral disc 2016    Diverticulitis of colon 2016    Gastroesophageal reflux disease with esophagitis 2016    Hypercholesteremia 2016    Osteoarthritis 2016    Paroxysmal atrial fibrillation 2016     Past Surgical History:   Procedure Laterality Date    APPENDECTOMY      BACK SURGERY      C-SPINE, LUMBAR SPINE    BLADDER SURGERY      CHOLECYSTECTOMY      ENDOSCOPY N/A 2023    Distal Esophagitis LA Grade A otherwise  normal exam    ENDOSCOPY  09/29/2015    Non-bleeding erosive gastropathy    HYSTERECTOMY      JOINT REPLACEMENT Right     HIP    KNEE SURGERY Right     TOTAL HIP ARTHROPLASTY Left     WOUND DEBRIDEMENT Right 10/17/2024    Procedure: DEBRIDEMENT OF RIGHT HIP WOUND, WOUND VAC PLACEMENT, FECAL DISIMPACTION;  Surgeon: Wade Alegria MD;  Location: [facility] OR;  Service: General;  Laterality: Right;     PT Assessment (Last 12 Hours)       PT Evaluation and Treatment       Row Name 10/19/24 1455          Physical Therapy Time and Intention    Subjective Information complains of;pain  -NW     Document Type therapy note (daily note);wound care  -NW     Mode of Treatment physical therapy  -NW     Comment WV checked and intact  -NW       Row Name 10/19/24 1455          General Information    Existing Precautions/Restrictions fall  WV R hip  -NW       Row Name 10/19/24 1455          Pain    Pain Location hip  -NW     Pain Side/Orientation right  -NW       Row Name 10/19/24 1455          Pain Scale: FACES Pre/Post-Treatment    Pain: FACES Scale, Pretreatment 2-->hurts little bit  -NW     Posttreatment Pain Rating 4-->hurts little more  -NW       Row Name 10/19/24 1455          Bed Mobility    Supine-Sit [place] (Bed Mobility) maximum assist (25% patient effort)  -     Sit-Supine [place] (Bed Mobility) maximum assist (25% patient effort)  -       Row Name 10/19/24 1455          Transfers    Transfers sit-stand transfer;stand-sit transfer  -       Row Name 10/19/24 1455          Sit-Stand Transfer    Sit-Stand [place] (Transfers) moderate assist (50% patient effort);maximum assist (25% patient effort);2 person assist  -       Row Name 10/19/24 1455          Stand-Sit Transfer    Stand-Sit [place] (Transfers) moderate assist (50% patient effort)  -       Row Name 10/19/24 1455          Gait/Stairs (Locomotion)    Comment, (Gait/Stairs) sat EOB working on sitting balance pt leaning R. pt able to hold  self in sitting briefly then lean back to right. cont to work on. Family reports pt has amb in several months only able to stand and transfer. Son help to stand pt mod x2 upon standing pt had BM had to sit back down and lay down to get pt clean up rolling L-R  -NW       Row Name 10/19/24 1455          Safety Issues/Impairments Affecting Functional Mobility    Safety Issues Affecting Function (Mobility) friction/shear risk  -NW     Impairments Affecting Function (Mobility) cognition;range of motion (ROM)  -NW     Cognitive Impairments, Mobility Safety/Performance insight into deficits/self-awareness  -NW       Row Name             Wound 10/16/24 2147 Bilateral gluteal    Wound - Properties Group Placement Date: 10/16/24  -SC Placement Time: 2147 -SC Side: Bilateral  -SC Location: gluteal  -SC Present on Original Admission: Y  -SC    Retired Wound - Properties Group Placement Date: 10/16/24  -SC Placement Time: 2147 -SC Present on Original Admission: Y  -SC Side: Bilateral  -SC Location: gluteal  -SC    Retired Wound - Properties Group Placement Date: 10/16/24  -SC Placement Time: 2147 -SC Present on Original Admission: Y  -SC Side: Bilateral  -SC Location: gluteal  -SC    Retired Wound - Properties Group Date first assessed: 10/16/24  -SC Time first assessed: 2147 -SC Present on Original Admission: Y  -SC Side: Bilateral  -SC Location: gluteal  -SC      Row Name 10/19/24 1455          Wound 10/16/24 2000 Right hip    Wound - Properties Group Placement Date: 10/16/24  -SC Placement Time: 2000  -SC Side: Right  -SC Location: hip  -SC    Dressing Appearance intact  -NW     Closure Unable to assess  -NW     Base unable to visualize  -NW     Care, Wound negative pressure wound therapy  -NW     Wound Output (mL) 100  -NW     Retired Wound - Properties Group Placement Date: 10/16/24  -SC Placement Time: 2000  -SC Side: Right  -SC Location: hip  -SC    Retired Wound - Properties Group Placement Date: 10/16/24  -SC  Placement Time: 2000  -SC Side: Right  -SC Location: hip  -SC    Retired Wound - Properties Group Date first assessed: 10/16/24  -SC Time first assessed: 2000  -SC Side: Right  -SC Location: hip  -SC      Row Name 10/19/24 1455          Wound 10/17/24 1038 Right anterior greater trochanter surgical    Wound - Properties Group Placement Date: 10/17/24  -SB Placement Time: 1038  -SB Side: Right  -SB Orientation: anterior  -SB Location: greater trochanter  -SB Primary Wound Type: Incision  -SB Type: surgical  -SB Present on Original Admission: Y  -SB    Wound Output (mL) 0  100  -NW     Retired Wound - Properties Group Placement Date: 10/17/24  -SB Placement Time: 1038  -SB Present on Original Admission: Y  -SB Side: Right  -SB Orientation: anterior  -SB Location: greater trochanter  -SB Primary Wound Type: Incision  -SB Type: surgical  -SB    Retired Wound - Properties Group Placement Date: 10/17/24  -SB Placement Time: 1038  -SB Present on Original Admission: Y  -SB Side: Right  -SB Orientation: anterior  -SB Location: greater trochanter  -SB Primary Wound Type: Incision  -SB Type: surgical  -SB    Retired Wound - Properties Group Date first assessed: 10/17/24  -SB Time first assessed: 1038  -SB Present on Original Admission: Y  -SB Side: Right  -SB Location: greater trochanter  -SB Primary Wound Type: Incision  -SB Type: surgical  -SB      Row Name 10/19/24 1455          NPWT (Negative Pressure Wound Therapy) 10/17/24 right trocar    NPWT (Negative Pressure Wound Therapy) - Properties Group Placement Date: 10/17/24  - Location: right trocar  -SH Additional Comments: presetn upon arrival to PACU  -    Therapy Setting continuous therapy  -NW     Pressure Setting 125 mmHg  -NW     Retired NPWT (Negative Pressure Wound Therapy) - Properties Group Placement Date: 10/17/24  - Location: right trocar  -SH Additional Comments: presetn upon arrival to PACU  -    Retired NPWT (Negative Pressure Wound Therapy) -  Properties Group Placement Date: 10/17/24  - Location: right trocar  - Additional [facility]    Retired NPWT (Negative Pressure Wound Therapy) - Properties Group Placement Date: 10/17/24  - Location: right trocar  - Additional Comments: presetn upon arrival to [facility]      Row Name 10/19/24 1455          Positioning and Restraints    Pre-Treatment Position in bed  -NW     Post Treatment Position bed  -NW     In Bed fowlers;call light within reach;encouraged to call for assist;with family/caregiver;notified nsg  -NW               User Key  (r) = Recorded By, (t) = Taken By, (c) = Cosigned By      Initials Name Provider Type    NW Rayne Daley, PTA Physical Therapist Assistant    Shea Wiggins RN Registered Nurse    Jacquelyn Whitaker RN Registered Nurse    Rosemarie Shepherd RN Registered Nurse                    Physical Therapy Education       Title: PT OT SLP Therapies (In Progress)       Topic: Physical Therapy (In Progress)       Point: Mobility training (In Progress)       Learning Progress Summary            Patient Acceptance, E, NR by SB at 10/18/2024 1508    Comment: edu on wound vac, alarm system, pressure relief    Acceptance, E,D, VU,DU,NR by BL at 10/18/2024 1020    Comment: Educated on body mechanics to perform functional mobility more easily. Educated on the importance of repositioning to prevent pressure injuries.   Family Acceptance, E,D, VU,DU,NR by BL at 10/18/2024 1020    Comment: Educated on body mechanics to perform functional mobility more easily. Educated on the importance of repositioning to prevent pressure injuries.                      Point: Body mechanics (In Progress)       Learning Progress Summary            Patient Acceptance, E, NR by SB at 10/18/2024 1508    Comment: edu on wound vac, alarm system, pressure relief    Acceptance, E,D, VU,DU,NR by BL at 10/18/2024 1020    Comment: Educated on body mechanics to perform functional  mobility more easily. Educated on the importance of repositioning to prevent pressure injuries.   Family Acceptance, E,D, VU,DU,NR by BL at 10/18/2024 1020    Comment: Educated on body mechanics to perform functional mobility more easily. Educated on the importance of repositioning to prevent pressure injuries.                      Point: Precautions (In Progress)       Learning Progress Summary            Patient Acceptance, E, NR by SB at 10/18/2024 1508    Comment: edu on wound vac, alarm system, pressure relief    Acceptance, E,D, VU,DU,NR by BL at 10/18/2024 1020    Comment: Educated on body mechanics to perform functional mobility more easily. Educated on the importance of repositioning to prevent pressure injuries.   Family Acceptance, E,D, VU,DU,NR by BL at 10/18/2024 1020    Comment: Educated on body mechanics to perform functional mobility more easily. Educated on the importance of repositioning to prevent pressure injuries.                                      User Key       Initials Effective Dates Name Provider Type Discipline     07/11/23 -  Tita Diggs, PT DPT Physical Therapist PT     08/21/24 -  Jonel Ann, [place] Student PT Student PT                  PT Recommendation and Plan             Time Calculation:         PT G-Codes  Outcome Measure Options: AM-PAC 6 Clicks Basic Mobility (PT)  AM-PAC 6 Clicks Score (PT): 8    Rayne Daley, PTA  10/19/2024

## 2024-10-19 NOTE — PLAN OF CARE
Goal Outcome Evaluation:  Plan of Care Reviewed With: family, patient        Progress: no change  Outcome Evaluation: Alert to self, VSS. C/O in right hip, see mar. Wound vac to right hip, CDI. Family at bedside, RA, SCDS. Oral ABX. contact isolation maintained. call light within reach. Patient transferred to [facility]. turn q 2. plan to dc home with home health care and wound vac when possible. safety  maintained

## 2024-10-20 LAB
ALBUMIN SERPL-MCNC: 3.1 G/DL (ref 3.5–5.2)
ALBUMIN/GLOB SERPL: 1 G/DL
ALP SERPL-CCNC: 130 U/L (ref 39–117)
ALT SERPL W P-5'-P-CCNC: 245 U/L (ref 1–33)
ANION GAP SERPL CALCULATED.3IONS-SCNC: 8 MMOL/L (ref 5–15)
AST SERPL-CCNC: 102 U/L (ref 1–32)
BILIRUB SERPL-MCNC: 0.5 MG/DL (ref 0–1.2)
BUN SERPL-MCNC: 18 MG/DL (ref 8–23)
BUN/CREAT SERPL: 15.4 (ref 7–25)
CALCIUM SPEC-SCNC: 8.4 MG/DL (ref 8.6–10.5)
CHLORIDE SERPL-SCNC: 103 MMOL/L (ref 98–107)
CO2 SERPL-SCNC: 21 MMOL/L (ref 22–29)
CREAT SERPL-MCNC: 1.17 MG/DL (ref 0.57–1)
EGFRCR SERPLBLD CKD-EPI 2021: 45.3 ML/MIN/1.73
GLOBULIN UR ELPH-MCNC: 3.2 GM/DL
GLUCOSE SERPL-MCNC: 90 MG/DL (ref 65–99)
MAGNESIUM SERPL-MCNC: 1.9 MG/DL (ref 1.6–2.4)
POTASSIUM SERPL-SCNC: 3.7 MMOL/L (ref 3.5–5.2)
PROT SERPL-MCNC: 6.3 G/DL (ref 6–8.5)
SODIUM SERPL-SCNC: 132 MMOL/L (ref 136–145)

## 2024-10-20 PROCEDURE — 80053 COMPREHEN METABOLIC PANEL: CPT | Performed by: INTERNAL MEDICINE

## 2024-10-20 PROCEDURE — 94799 UNLISTED PULMONARY SVC/PX: CPT

## 2024-10-20 PROCEDURE — 94664 DEMO&/EVAL PT USE INHALER: CPT

## 2024-10-20 PROCEDURE — 83735 ASSAY OF MAGNESIUM: CPT | Performed by: INTERNAL MEDICINE

## 2024-10-20 PROCEDURE — 94761 N-INVAS EAR/PLS OXIMETRY MLT: CPT

## 2024-10-20 RX ADMIN — LEVOTHYROXINE SODIUM 75 MCG: 0.07 TABLET ORAL at 06:15

## 2024-10-20 RX ADMIN — IPRATROPIUM BROMIDE AND ALBUTEROL SULFATE 3 ML: .5; 3 SOLUTION RESPIRATORY (INHALATION) at 07:18

## 2024-10-20 RX ADMIN — AMOXICILLIN 500 MG: 500 CAPSULE ORAL at 09:40

## 2024-10-20 RX ADMIN — Medication 10 ML: at 20:48

## 2024-10-20 RX ADMIN — Medication 1 TABLET: at 09:40

## 2024-10-20 RX ADMIN — CITALOPRAM HYDROBROMIDE 20 MG: 20 TABLET, FILM COATED ORAL at 09:40

## 2024-10-20 RX ADMIN — IPRATROPIUM BROMIDE AND ALBUTEROL SULFATE 3 ML: .5; 3 SOLUTION RESPIRATORY (INHALATION) at 18:47

## 2024-10-20 RX ADMIN — Medication 10 ML: at 09:41

## 2024-10-20 RX ADMIN — AMOXICILLIN 500 MG: 500 CAPSULE ORAL at 20:48

## 2024-10-20 NOTE — PLAN OF CARE
Goal Outcome Evaluation:  Plan of Care Reviewed With: patient, child        Progress: no change  Outcome Evaluation: Patient stable overnight. turn q2. no c/o pain. son at bedside. alert to self. external catheter in place. wound vac to right hip. RAC IV IDD, RA, plan to discharge home with home health, wound vac and speciality mattress

## 2024-10-20 NOTE — PLAN OF CARE
Goal Outcome Evaluation:  Plan of Care Reviewed With: patient, family        Progress: no change  Outcome Evaluation: Patient no c/o pain. patient turn q 2. Voiding. IID. Wound vac.

## 2024-10-20 NOTE — PROGRESS NOTES
[facility] Medicine Services  INPATIENT PROGRESS NOTE    Patient Name: Sofía Clifton  Date of Admission: 10/16/2024  Today's Date: 10/20/24  Length of Stay: 4  Primary Care Physician: Sukhi Pelayo MD    Subjective   Chief Complaint: f/u wound/infection    HPI   Patient resting in bed.  Son at bedside.  Feels well.  No new complaints.  Afebrile.  Tolerating p.o. but has not been drinking a lot of fluids to last 24 hours per nursing and family.      Review of Systems   All pertinent negatives and positives are as above. All other systems have been reviewed and are negative unless otherwise stated.     Objective    Temp:  [98 °F (36.7 °C)-98.3 °F (36.8 °C)] 98 °F (36.7 °C)  Heart Rate:  [68-86] 86  Resp:  [16-18] 16  BP: (119-140)/(53-63) 119/53  Physical Exam  GEN: Awake, alert, interactive, in NAD  HEENT:  PERRLA, EOMI, Anicteric, Trachea midline  Lungs: no wheezing/rales/rhonchi  Heart: RRR, +S1/s2, no rub  ABD: soft, nt/nd, +BS, no guarding/rebound  Extremities: no cyanosis, no pitting edema  Skin: wound vac in place to R hip/iliac crest  Neuro: [place], no obvious focal deficits     Results Review:  I have reviewed the labs, radiology results, and diagnostic studies.    Laboratory Data:   Results from last 7 days   Lab Units 10/19/24  0301 10/18/24  0517 10/17/24  0438 10/16/24  1549   WBC 10*3/mm3  --  10.39 11.32* 11.89*   HEMOGLOBIN g/dL 8.1* 8.0* 8.6* 8.6*   HEMATOCRIT % 25.4* 25.2* 26.6* 26.4*   PLATELETS 10*3/mm3  --  205 195 198        Results from last 7 days   Lab Units 10/20/24  0354 10/19/24  0301 10/18/24  0517   SODIUM mmol/L 132* 134* 138   POTASSIUM mmol/L 3.7 3.1* 3.5   CHLORIDE mmol/L 103 100 104   CO2 mmol/L 21.0* 20.0* 22.0   BUN mg/dL 18 30* 35*   CREATININE mg/dL 1.17* 1.44* 1.56*   CALCIUM mg/dL 8.4* 8.3* 8.9   BILIRUBIN mg/dL 0.5 0.3 0.6   ALK PHOS U/L 130* 143* 131*   ALT (SGPT) U/L 245* 272* 339*   AST (SGOT) U/L 102* 115* 172*   GLUCOSE mg/dL 90 103*  108*       Culture Data:   Blood Culture   Date Value Ref Range Status   10/16/2024 No growth at 24 hours  Preliminary   10/16/2024 No growth at 24 hours  Preliminary     Wound Culture   Date Value Ref Range Status   10/17/2024 Scant growth (1+) Enterococcus faecalis (A)  Final   10/16/2024 Light growth (2+) Enterococcus faecalis (A)  Final       Radiology Data:   Imaging Results (Last 24 Hours)       ** No results found for the last 24 hours. **            I have reviewed the patient's current medications.     Assessment/Plan   Assessment  Active Hospital Problems    Diagnosis     **ARF (acute renal failure)     Moderate malnutrition     Fecal impaction     Pressure ulcer, hip, right, unstageable     Infected wound     Abnormal LFTs     Advanced age     Acute urinary retention     Pneumonia due to human metapneumovirus     Chronic constipation     Anemia        Treatment Plan  #1 ANTONIO - continues to improve slowly, ivf now off, encourage po intake. Creat down to 1.17 and gfr 45. Avoid nephrotoxic meds.    #2 Pressure wound/infection - s/p OR on 10/17 for debridement and washout. No signs of osteo. Wound vac in place. Cultures growing Enterococcus faecalis.  Vanco and Zosyn changed to amoxicillin 10/18.     #3 human metapneumovirus -supportive care.  On room air.    #4 fecal impaction -status post BM.  Continue bowel regimen    #5 transaminitis -trending down.  Continue supportive care.  Unclear initial etiology?  CK level normal.  May have had mild rhabdo given degree of tissue wound/infection on arrival which has resolved.  Patient is not on statin    #6 chronic anemia -H&H fairly stable.  No signs of obvious bleeding.    #7 hypokalemia - improved with replacement    #8 hyponatremia - mild, 132, pt didn't drink much water yesterday, encourage po intake    Medical Decision Making  Number and Complexity of problems: 4-5 acute, multiple chronic  Differential Diagnosis: as above    Conditions and Status        Stable,  interactive, does not appear toxic     MDM Data  External documents reviewed: none  Cardiac tracing (EKG, telemetry) interpretation: none  Radiology interpretation: reports reviewed  Labs reviewed: as above  Any tests that were considered but not ordered: none     Decision rules/scores evaluated (example [id number], Wells, etc): none     Discussed with: patient, Dr. Alegria,      Care Planning  Shared decision making: Patient apprised of current labs, vitals, imaging and treatment plan.  They are agreeable with proceeding with plans as discussed.    Code status and discussions: dnr/dni    Disposition  Social Determinants of Health that impact treatment or disposition: none  I expect the patient to be discharged to likely home w/ HHS.  Therapies recommending STR/SNF but family would like home w/ HHS at d/c. Needs woundvac and specialty mattress if going home.  Social work team is working on orders/paperwork.    Electronically signed by Xavier Collins DO, 10/20/24, 09:26 CDT.

## 2024-10-21 LAB
ALBUMIN SERPL-MCNC: 3.1 G/DL (ref 3.5–5.2)
ALBUMIN/GLOB SERPL: 1 G/DL
ALP SERPL-CCNC: 117 U/L (ref 39–117)
ALT SERPL W P-5'-P-CCNC: 196 U/L (ref 1–33)
ANION GAP SERPL CALCULATED.3IONS-SCNC: 9 MMOL/L (ref 5–15)
AST SERPL-CCNC: 80 U/L (ref 1–32)
BACTERIA SPEC AEROBE CULT: NORMAL
BACTERIA SPEC AEROBE CULT: NORMAL
BILIRUB SERPL-MCNC: 0.5 MG/DL (ref 0–1.2)
BUN SERPL-MCNC: 14 MG/DL (ref 8–23)
BUN/CREAT SERPL: 14.4 (ref 7–25)
CALCIUM SPEC-SCNC: 8.2 MG/DL (ref 8.6–10.5)
CHLORIDE SERPL-SCNC: 101 MMOL/L (ref 98–107)
CO2 SERPL-SCNC: 20 MMOL/L (ref 22–29)
CREAT SERPL-MCNC: 0.97 MG/DL (ref 0.57–1)
EGFRCR SERPLBLD CKD-EPI 2021: 56.7 ML/MIN/1.73
GLOBULIN UR ELPH-MCNC: 3.1 GM/DL
GLUCOSE SERPL-MCNC: 88 MG/DL (ref 65–99)
POTASSIUM SERPL-SCNC: 3.7 MMOL/L (ref 3.5–5.2)
PROT SERPL-MCNC: 6.2 G/DL (ref 6–8.5)
SODIUM SERPL-SCNC: 130 MMOL/L (ref 136–145)

## 2024-10-21 PROCEDURE — 94664 DEMO&/EVAL PT USE INHALER: CPT

## 2024-10-21 PROCEDURE — 97605 NEG PRS WND THER DME<=50SQCM: CPT

## 2024-10-21 PROCEDURE — 97530 THERAPEUTIC ACTIVITIES: CPT

## 2024-10-21 PROCEDURE — 94799 UNLISTED PULMONARY SVC/PX: CPT

## 2024-10-21 PROCEDURE — 94761 N-INVAS EAR/PLS OXIMETRY MLT: CPT

## 2024-10-21 PROCEDURE — 80053 COMPREHEN METABOLIC PANEL: CPT | Performed by: INTERNAL MEDICINE

## 2024-10-21 RX ORDER — AMOXICILLIN 500 MG/1
500 CAPSULE ORAL EVERY 8 HOURS SCHEDULED
Status: DISCONTINUED | OUTPATIENT
Start: 2024-10-21 | End: 2024-10-23 | Stop reason: HOSPADM

## 2024-10-21 RX ADMIN — Medication 10 ML: at 21:10

## 2024-10-21 RX ADMIN — IPRATROPIUM BROMIDE AND ALBUTEROL SULFATE 3 ML: .5; 3 SOLUTION RESPIRATORY (INHALATION) at 20:20

## 2024-10-21 RX ADMIN — LEVOTHYROXINE SODIUM 75 MCG: 0.07 TABLET ORAL at 06:31

## 2024-10-21 RX ADMIN — Medication 10 ML: at 08:25

## 2024-10-21 RX ADMIN — AMOXICILLIN 500 MG: 500 CAPSULE ORAL at 09:12

## 2024-10-21 RX ADMIN — AMOXICILLIN 500 MG: 500 CAPSULE ORAL at 21:10

## 2024-10-21 RX ADMIN — AMOXICILLIN 500 MG: 500 CAPSULE ORAL at 16:58

## 2024-10-21 RX ADMIN — ACETAMINOPHEN 650 MG: 325 TABLET ORAL at 10:56

## 2024-10-21 RX ADMIN — IPRATROPIUM BROMIDE AND ALBUTEROL SULFATE 3 ML: .5; 3 SOLUTION RESPIRATORY (INHALATION) at 06:00

## 2024-10-21 RX ADMIN — Medication 1 TABLET: at 08:23

## 2024-10-21 RX ADMIN — CITALOPRAM HYDROBROMIDE 20 MG: 20 TABLET, FILM COATED ORAL at 08:23

## 2024-10-21 NOTE — THERAPY TREATMENT NOTE
Acute Care - Physical Therapy Treatment Note  [facility]     Patient Name: Sofía Clifton  : 1935  MRN: [id number]  Today's Date: 10/21/2024   Onset of Illness/Injury or Date of Surgery: 10/17/24  Visit Dx:     ICD-10-CM ICD-9-CM   1. Acute renal failure, unspecified acute renal failure type  N17.9 584.9   2. Cellulitis of hip  L03.119 682.6   3. Pneumonia due to human metapneumovirus (hMPV)  J12.3 480.8   4. Pressure ulcer, hip, right, unstageable  L89.210 707.04     707.25   5. Impaired mobility [Z74.09]  Z74.09 799.89   6. Open wound [T14.8XXA]  T14.8XXA 879.8     Patient Active Problem List   Diagnosis    Paroxysmal atrial fibrillation    Anemia    Benign essential hypertension    Gastroesophageal reflux disease with esophagitis    Diverticulitis of colon    Chronic constipation    Osteoarthritis    Degeneration of lumbar intervertebral disc    Hypercholesteremia    Pericardial cyst    Chest pain    Melena    Hypothyroidism (acquired)    Acute blood loss anemia - requiring transfusion    PAD (peripheral artery disease)    RLS (restless legs syndrome)    ARF (acute renal failure)    Pressure ulcer, hip, right, unstageable    Infected wound    Abnormal LFTs    Advanced age    Acute urinary retention    Pneumonia due to human metapneumovirus    Fecal impaction    Moderate malnutrition     Past Medical History:   Diagnosis Date    Anemia 2016    Benign essential hypertension 2016    Chronic constipation 2016    Degeneration of lumbar intervertebral disc 2016    Diverticulitis of colon 2016    Gastroesophageal reflux disease with esophagitis 2016    Hypercholesteremia 2016    Osteoarthritis 2016    Paroxysmal atrial fibrillation 2016     Past Surgical History:   Procedure Laterality Date    APPENDECTOMY      BACK SURGERY      C-SPINE, LUMBAR SPINE    BLADDER SURGERY      CHOLECYSTECTOMY      ENDOSCOPY N/A 2023    Distal Esophagitis LA Grade A otherwise  normal exam    ENDOSCOPY  09/29/2015    Non-bleeding erosive gastropathy    HYSTERECTOMY      JOINT REPLACEMENT Right     HIP    KNEE SURGERY Right     TOTAL HIP ARTHROPLASTY Left     WOUND DEBRIDEMENT Right 10/17/2024    Procedure: DEBRIDEMENT OF RIGHT HIP WOUND, WOUND VAC PLACEMENT, FECAL DISIMPACTION;  Surgeon: Wade Alegria MD;  Location: [facility];  Service: General;  Laterality: Right;     PT Assessment (Last 12 Hours)       PT Evaluation and Treatment       Row Name 10/21/24 1028          Physical Therapy Time and Intention    Subjective Information complains of;pain  -AE     Document Type therapy note (daily note)  -AE     Mode of Treatment physical therapy  -AE       Row Name 10/21/24 1028          General Information    Existing Precautions/Restrictions fall  WV R hip  -AE       Row Name 10/21/24 1028          Pain    Pain Location hip  -AE     Pain Side/Orientation right  -AE     Pain Management Interventions positioning techniques utilized  -AE     Response to Pain Interventions activity participation with tolerable pain  -AE       Row Name 10/21/24 1028          Pain Scale: FACES Pre/Post-Treatment    Pain: FACES Scale, Pretreatment 0-->no hurt  -AE     Posttreatment Pain Rating 4-->hurts little more  -AE       Row Name 10/21/24 1028          Bed Mobility    Supine-Sit Door (Bed Mobility) moderate assist (50% patient effort);verbal cues  -AE       Row Name 10/21/24 1028          Sit-Stand Transfer    Sit-Stand Door (Transfers) maximum assist (25% patient effort);2 person assist  -AE     Assistive Device (Sit-Stand Transfers) --  HHA  -AE       Row Name 10/21/24 1028          Stand-Sit Transfer    Stand-Sit Door (Transfers) moderate assist (50% patient effort);2 person assist  -AE       Row Name 10/21/24 1028          Balance    Static Sitting Balance contact guard;standby assist  -AE       Row Name 10/21/24 1028          Hip (Therapeutic Exercise)    Hip (Therapeutic Exercise)  AAROM (active assistive range of motion)  -AE     Hip AAROM (Therapeutic Exercise) flexion;10 repetitions  -AE       Row Name 10/21/24 1028          Knee (Therapeutic Exercise)    Knee (Therapeutic Exercise) AROM (active range of motion)  -AE     Knee AROM (Therapeutic Exercise) LAQ (long arc quad);10 repetitions  -AE       Row Name             Wound 10/16/24 2147 Bilateral gluteal    Wound - Properties Group Placement Date: 10/16/24  -SC Placement Time: 2147 -SC Side: Bilateral  -SC Location: gluteal  -SC Present on Original Admission: Y  -SC    Retired Wound - Properties Group Placement Date: 10/16/24  -SC Placement Time: 2147 -SC Present on Original Admission: Y  -SC Side: Bilateral  -SC Location: gluteal  -SC    Retired Wound - Properties Group Placement Date: 10/16/24  -SC Placement Time: 2147 -SC Present on Original Admission: Y  -SC Side: Bilateral  -SC Location: gluteal  -SC    Retired Wound - Properties Group Date first assessed: 10/16/24  -SC Time first assessed: 2147 -SC Present on Original Admission: Y  -SC Side: Bilateral  -SC Location: gluteal  -SC      Row Name             Wound 10/16/24 2000 Right hip    Wound - Properties Group Placement Date: 10/16/24  -SC Placement Time: 2000  -SC Side: Right  -SC Location: hip  -SC    Retired Wound - Properties Group Placement Date: 10/16/24  -SC Placement Time: 2000 -SC Side: Right  -SC Location: hip  -SC    Retired Wound - Properties Group Placement Date: 10/16/24  -SC Placement Time: 2000  -SC Side: Right  -SC Location: hip  -SC    Retired Wound - Properties Group Date first assessed: 10/16/24  -SC Time first assessed: 2000  -SC Side: Right  -SC Location: hip  -SC      Row Name             Wound 10/17/24 1038 Right anterior greater trochanter surgical    Wound - Properties Group Placement Date: 10/17/24  -SB Placement Time: 1038  -SB Side: Right  -SB Orientation: anterior  -SB Location: greater trochanter  -SB Primary Wound Type: Incision  -SB Type:  surgical  -SB Present on Original Admission: Y  -SB    Retired Wound - Properties Group Placement Date: 10/17/24  -SB Placement Time: 1038  -SB Present on Original Admission: Y  -SB Side: Right  -SB Orientation: anterior  -SB Location: greater trochanter  -SB Primary Wound Type: Incision  -SB Type: surgical  -SB    Retired Wound - Properties Group Placement Date: 10/17/24  -SB Placement Time: 1038  -SB Present on Original Admission: Y  -SB Side: Right  -SB Orientation: anterior  -SB Location: greater trochanter  -SB Primary Wound Type: Incision  -SB Type: surgical  -SB    Retired Wound - Properties Group Date first assessed: 10/17/24  -SB Time first assessed: 1038  -SB Present on Original Admission: Y  -SB Side: Right  -SB Location: greater trochanter  -SB Primary Wound Type: Incision  -SB Type: surgical  -SB      Row Name             NPWT (Negative Pressure Wound Therapy) 10/17/24 right trocar    NPWT (Negative Pressure Wound Therapy) - Properties Group Placement Date: 10/17/24  - Location: right trocar  -SH Additional Comments: presetn upon arrival to PACU  -    Retired NPWT (Negative Pressure Wound Therapy) - Properties Group Placement Date: 10/17/24  - Location: right trocar  -SH Additional Comments: presetn upon arrival to PACU  -    Retired NPWT (Negative Pressure Wound Therapy) - Properties Group Placement Date: 10/17/24  - Location: right trocar  -SH Additional Comments: presetn upon arrival to PACU  -    Retired NPWT (Negative Pressure Wound Therapy) - Properties Group Placement Date: 10/17/24  - Location: right trocar  -SH [facility]  -      Row Name 10/21/24 1028          Positioning and Restraints    Pre-Treatment Position in bed  -AE     Post Treatment Position bed  -AE     In Bed side lying left;call light within reach;exit alarm on;patient within staff view  -AE               User Key  (r) = Recorded By, (t) = Taken By, (c) = Cosigned By       Initials Name Provider Type    AE Mable Tiwari H, PTA Physical Therapist Assistant    SH Shea Back, RN Registered Nurse    Jacquelyn Whitaker, RN Registered Nurse    Rosemarie Shepherd, RN Registered Nurse                    Physical Therapy Education       Title: PT OT SLP Therapies (In Progress)       Topic: Physical Therapy (In Progress)       Point: Mobility training (In Progress)       Learning Progress Summary            Patient Acceptance, E, NR by SB at 10/18/2024 1508    Comment: edu on wound vac, alarm system, pressure relief    Acceptance, E,D, VU,DU,NR by BL at 10/18/2024 1020    Comment: Educated on body mechanics to perform functional mobility more easily. Educated on the importance of repositioning to prevent pressure injuries.   Family Acceptance, E,D, VU,DU,NR by BL at 10/18/2024 1020    Comment: Educated on body mechanics to perform functional mobility more easily. Educated on the importance of repositioning to prevent pressure injuries.                      Point: Body mechanics (In Progress)       Learning Progress Summary            Patient Acceptance, E, NR by SB at 10/18/2024 1508    Comment: edu on wound vac, alarm system, pressure relief    Acceptance, E,D, VU,DU,NR by BL at 10/18/2024 1020    Comment: Educated on body mechanics to perform functional mobility more easily. Educated on the importance of repositioning to prevent pressure injuries.   Family Acceptance, E,D, VU,DU,NR by BL at 10/18/2024 1020    Comment: Educated on body mechanics to perform functional mobility more easily. Educated on the importance of repositioning to prevent pressure injuries.                      Point: Precautions (In Progress)       Learning Progress Summary            Patient Acceptance, E, NR by SB at 10/18/2024 1508    Comment: edu on wound vac, alarm system, pressure relief    Acceptance, E,D, VU,DU,NR by BL at 10/18/2024 1020    Comment: Educated on body mechanics to perform functional mobility  more easily. Educated on the importance of repositioning to prevent pressure injuries.   Family Acceptance, E,D, VU,DU,NR by BL at 10/18/2024 1020    Comment: Educated on body mechanics to perform functional mobility more easily. Educated on the importance of repositioning to prevent pressure injuries.                                      User Key       Initials Effective Dates Name Provider Type Discipline    SB 07/11/23 -  Tita Diggs, PT DPT Physical Therapist PT    BL 08/21/24 -  Jonel Ann, EVERETT Student PT Student PT                  PT Recommendation and Plan     Progress: improving  Outcome Evaluation: Pt agreed to PT and had pain with movement but was unable to rate.Pt was mod x1 supine to sit and she was CGA-SBA for seated balance.She was mod x 2 to stand HHA with RN assisting.Pt wanted to keep legs crossed and had difficulty bring knees apart.Pt would benefit from continued rehab at SNF but pt plans to go home with family, HH and hospital bed.       Time Calculation:    PT Charges       Row Name 10/21/24 1114             Time Calculation    Start Time 1028  -AE      Stop Time 1106  -AE      Time Calculation (min) 38 min  -AE      PT Received On 10/21/24  -AE      PT Goal Re-Cert Due Date 10/28/24  -AE         Time Calculation- PT    Total Timed Code Minutes- PT 38 minute(s)  -AE         Timed Charges    33347 - PT Therapeutic Activity Minutes 38  -AE         Total Minutes    Timed Charges Total Minutes 38  -AE       Total Minutes 38  -AE                User Key  (r) = Recorded By, (t) = Taken By, (c) = Cosigned By      Initials Name Provider Type    AE Mable Tiwari PTA Physical Therapist Assistant                  Therapy Charges for Today       Code Description Service Date Service Provider Modifiers Qty    [id number]  PT THERAPEUTIC ACT EA 15 MIN 10/21/2024 Mable Tiwari PTA GP 3            PT G-Codes  Outcome Measure Options: AM-PAC 6 Clicks Basic Mobility (PT)  AM-PAC 6 Clicks Score (PT):  6    Mable Tiwari, PTA  10/21/2024

## 2024-10-21 NOTE — PLAN OF CARE
Problem: Adult Inpatient Plan of Care  Goal: Plan of Care Review  Outcome: Progressing  Flowsheets (Taken 10/21/2024 0705)  Progress: no change  Outcome Evaluation: VSS. Alert to self only. Family at bedside. PW in place. Incontinent of bowel and bladder. BM this shift. Q2turn. No evidence of pain thus far. SCD. IV R AC IID. PO abx. Wound vac in place. Call light within reach, safety maintained.  Plan of Care Reviewed With: patient

## 2024-10-21 NOTE — CASE MANAGEMENT/SOCIAL WORK
Continued Stay Note   [facility]     Patient Name: Sofía Clifton  MRN: [id number]  Today's Date: 10/21/2024    Admit Date: 10/16/2024    Plan: Home Health   Discharge Plan       Row Name 10/21/24 1213       Plan    Plan Home Health    Plan Comments Wound vac has been approved. Hospital bed and mattress not approved yet and pt wants to wait to go home once it is approved.                   Discharge Codes    No documentation.                       PABLO Trevino

## 2024-10-21 NOTE — THERAPY WOUND CARE TREATMENT
Acute Care - Physical Therapy Treatment Note  [facility]     Patient Name: Sofía Clifton  : 1935  MRN: [id number]  Today's Date: 10/21/2024   Onset of Illness/Injury or Date of Surgery: 10/17/24  Visit Dx:     ICD-10-CM ICD-9-CM   1. Acute renal failure, unspecified acute renal failure type  N17.9 584.9   2. Cellulitis of hip  L03.119 682.6   3. Pneumonia due to human metapneumovirus (hMPV)  J12.3 480.8   4. Pressure ulcer, hip, right, unstageable  L89.210 707.04     707.25   5. Impaired mobility [Z74.09]  Z74.09 799.89   6. Open wound [T14.8XXA]  T14.8XXA 879.8   7. Advanced age  R54 797     Patient Active Problem List   Diagnosis    Paroxysmal atrial fibrillation    Anemia    Benign essential hypertension    Gastroesophageal reflux disease with esophagitis    Diverticulitis of colon    Chronic constipation    Osteoarthritis    Degeneration of lumbar intervertebral disc    Hypercholesteremia    Pericardial cyst    Chest pain    Melena    Hypothyroidism (acquired)    Acute blood loss anemia - requiring transfusion    PAD (peripheral artery disease)    RLS (restless legs syndrome)    ARF (acute renal failure)    Pressure ulcer, hip, right, unstageable    Infected wound    Abnormal LFTs    Advanced age    Acute urinary retention    Pneumonia due to human metapneumovirus    Fecal impaction    Moderate malnutrition     Past Medical History:   Diagnosis Date    Anemia 2016    Benign essential hypertension 2016    Chronic constipation 2016    Degeneration of lumbar intervertebral disc 2016    Diverticulitis of colon 2016    Gastroesophageal reflux disease with esophagitis 2016    Hypercholesteremia 2016    Osteoarthritis 2016    Paroxysmal atrial fibrillation 2016     Past Surgical History:   Procedure Laterality Date    APPENDECTOMY      BACK SURGERY      C-SPINE, LUMBAR SPINE    BLADDER SURGERY      CHOLECYSTECTOMY      ENDOSCOPY N/A 2023    Distal  Esophagitis LA Grade A otherwise normal exam    ENDOSCOPY  09/29/2015    Non-bleeding erosive gastropathy    HYSTERECTOMY      JOINT REPLACEMENT Right     HIP    KNEE SURGERY Right     TOTAL HIP ARTHROPLASTY Left     WOUND DEBRIDEMENT Right 10/17/2024    Procedure: DEBRIDEMENT OF RIGHT HIP WOUND, WOUND VAC PLACEMENT, FECAL DISIMPACTION;  Surgeon: Wade Alegria MD;  Location: [facility];  Service: General;  Laterality: Right;     PT Assessment (Last 12 Hours)       PT Evaluation and Treatment       Row Name 10/21/24 1028          Physical Therapy Time and Intention    Subjective Information complains of;pain  -AE     Document Type therapy note (daily note)  -AE     Mode of Treatment physical therapy  -AE       Row Name 10/21/24 1028          General Information    Existing Precautions/Restrictions fall  WV R hip  -AE       Row Name 10/21/24 1028          Pain    Pain Location hip  -AE     Pain Side/Orientation right  -AE     Pain Management Interventions positioning techniques utilized  -AE     Response to Pain Interventions activity participation with tolerable pain  -AE       Row Name 10/21/24 1028          Pain Scale: FACES Pre/Post-Treatment    Pain: FACES Scale, Pretreatment 0-->no hurt  -AE     Posttreatment Pain Rating 4-->hurts little more  -AE       Row Name 10/21/24 1028          Bed Mobility    Supine-Sit [place] (Bed Mobility) moderate assist (50% patient effort);verbal cues  -AE       Row Name 10/21/24 1028          Sit-Stand Transfer    Sit-Stand [place] (Transfers) maximum assist (25% patient effort);2 person assist  -AE     Assistive Device (Sit-Stand Transfers) --  HHA  -AE       Row Name 10/21/24 1028          Stand-Sit Transfer    Stand-Sit [place] (Transfers) moderate assist (50% patient effort);2 person assist  -AE       Row Name 10/21/24 1028          Balance    Static Sitting Balance contact guard;standby assist  -AE       Row Name 10/21/24 1028          Hip (Therapeutic  Exercise)    Hip (Therapeutic Exercise) AAROM (active assistive range of motion)  -AE     Hip AAROM (Therapeutic Exercise) flexion;10 repetitions  -AE       Row Name 10/21/24 1028          Knee (Therapeutic Exercise)    Knee (Therapeutic Exercise) AROM (active range of motion)  -AE     Knee AROM (Therapeutic Exercise) LAQ (long arc quad);10 repetitions  -AE       Row Name             Wound 10/16/24 2147 Bilateral gluteal    Wound - Properties Group Placement Date: 10/16/24  -SC Placement Time: 2147 -SC Side: Bilateral  -SC Location: gluteal  -SC Present on Original Admission: Y  -SC    Retired Wound - Properties Group Placement Date: 10/16/24  -SC Placement Time: 2147 -SC Present on Original Admission: Y  -SC Side: Bilateral  -SC Location: gluteal  -SC    Retired Wound - Properties Group Placement Date: 10/16/24  -SC Placement Time: 2147 -SC Present on Original Admission: Y  -SC Side: Bilateral  -SC Location: gluteal  -SC    Retired Wound - Properties Group Date first assessed: 10/16/24  -SC Time first assessed: 2147  -SC Present on Original Admission: Y  -SC Side: Bilateral  -SC Location: gluteal  -SC      Row Name             Wound 10/16/24 2000 Right hip    Wound - Properties Group Placement Date: 10/16/24  -SC Placement Time: 2000 -SC Side: Right  -SC Location: hip  -SC    Retired Wound - Properties Group Placement Date: 10/16/24  -SC Placement Time: 2000 -SC Side: Right  -SC Location: hip  -SC    Retired Wound - Properties Group Placement Date: 10/16/24  -SC Placement Time: 2000  -SC Side: Right  -SC Location: hip  -SC    Retired Wound - Properties Group Date first assessed: 10/16/24  -SC Time first assessed: 2000  -SC Side: Right  -SC Location: hip  -SC      Row Name             Wound 10/17/24 1038 Right anterior greater trochanter surgical    Wound - Properties Group Placement Date: 10/17/24  -SB Placement Time: 1038  -SB Side: Right  -SB Orientation: anterior  -SB Location: greater trochanter  -SB  Primary Wound Type: Incision  -SB Type: surgical  -SB Present on Original Admission: Y  -SB    Retired Wound - Properties Group Placement Date: 10/17/24  -SB Placement Time: 1038  -SB Present on Original Admission: Y  -SB Side: Right  -SB Orientation: anterior  -SB Location: greater trochanter  -SB Primary Wound Type: Incision  -SB Type: surgical  -SB    Retired Wound - Properties Group Placement Date: 10/17/24  -SB Placement Time: 1038  -SB Present on Original Admission: Y  -SB Side: Right  -SB Orientation: anterior  -SB Location: greater trochanter  -SB Primary Wound Type: Incision  -SB Type: surgical  -SB    Retired Wound - Properties Group Date first assessed: 10/17/24  -SB Time first assessed: 1038  -SB Present on Original Admission: Y  -SB Side: Right  -SB Location: greater trochanter  -SB Primary Wound Type: Incision  -SB Type: surgical  -SB      Row Name             NPWT (Negative Pressure Wound Therapy) 10/17/24 right trocar    NPWT (Negative Pressure Wound Therapy) - Properties Group Placement Date: 10/17/24  - Location: right trocar  -SH Additional Comments[facility]    Retired NPWT (Negative Pressure Wound Therapy) - Properties Group Placement Date: 10/17/24  - Location: right trocar  -SH Additional Comments: presetn upon arrival to PACU  [facility]    Retired NPWT (Negative Pressure Wound Therapy) - Properties Group Placement Date: 10/17/24  - Location: right trocar  -SH Additional Comments: [facility]    Retired NPWT (Negative Pressure Wound Therapy) - Properties Group Placement Date: 10/17/24  - Location: right trocar  -SH Additional Comments: [facility]      Row Name 10/21/24 1028          Positioning and Restraints    Pre-Treatment Position in bed  -AE     Post Treatment Position bed  -AE     In Bed side lying left;call light within reach;exit alarm on;patient within staff view  -AE               User Key  (r) = Recorded By, (t)  = Taken By, (c) = Cosigned By      Initials Name Provider Type    AE Mable Tiwari, PTA Physical Therapist Assistant    Shea Wiggins, RN Registered Nurse    Jacquelyn Whitaker, RN Registered Nurse    Rosemarie Shepherd, RN Registered Nurse                    Physical Therapy Education       Title: PT OT SLP Therapies (In Progress)       Topic: Physical Therapy (In Progress)       Point: Mobility training (In Progress)       Learning Progress Summary            Patient Acceptance, E, NR by SB at 10/18/2024 1508    Comment: edu on wound vac, alarm system, pressure relief    Acceptance, E,D, VU,DU,NR by BL at 10/18/2024 1020    Comment: Educated on body mechanics to perform functional mobility more easily. Educated on the importance of repositioning to prevent pressure injuries.   Family Acceptance, E,D, VU,DU,NR by BL at 10/18/2024 1020    Comment: Educated on body mechanics to perform functional mobility more easily. Educated on the importance of repositioning to prevent pressure injuries.                      Point: Body mechanics (In Progress)       Learning Progress Summary            Patient Acceptance, E, NR by SB at 10/18/2024 1508    Comment: edu on wound vac, alarm system, pressure relief    Acceptance, E,D, VU,DU,NR by BL at 10/18/2024 1020    Comment: Educated on body mechanics to perform functional mobility more easily. Educated on the importance of repositioning to prevent pressure injuries.   Family Acceptance, E,D, VU,DU,NR by BL at 10/18/2024 1020    Comment: Educated on body mechanics to perform functional mobility more easily. Educated on the importance of repositioning to prevent pressure injuries.                      Point: Precautions (In Progress)       Learning Progress Summary            Patient Acceptance, E, NR by SB at 10/18/2024 1508    Comment: edu on wound vac, alarm system, pressure relief    Acceptance, E,D, VU,DU,NR by BL at 10/18/2024 1020    Comment: Educated on body  mechanics to perform functional mobility more easily. Educated on the importance of repositioning to prevent pressure injuries.   Family Acceptance, E,D, VU,DU,NR by BL at 10/18/2024 1020    Comment: Educated on body mechanics to perform functional mobility more easily. Educated on the importance of repositioning to prevent pressure injuries.                                      User Key       Initials Effective Dates Name Provider Type Discipline    SB 07/11/23 -  Tita Diggs, PT DPT Physical Therapist PT    BL 08/21/24 -  Jonel Ann, EVERETT Student PT Student PT                  PT Recommendation and Plan     Progress: no change  Outcome Evaluation: Pt grimaced in pain with wound vac change but was unable to rate.Pt was pre-medicated with tylenol.Pt's previous dressing intact and 190ml sanginous drainage in canister.RN took picture of wound.Measurments 6.5 x 5 x 1 cm.Good seal achieved.PT will continue to check dressing daily and change MWF.       Time Calculation:    PT Charges       Row Name 10/21/24 1302 10/21/24 1114          Time Calculation    Start Time 1120  -AE 1028  -AE     Stop Time 1157  -AE 1106  -AE     Time Calculation (min) 37 min  -AE 38 min  -AE     PT Received On 10/21/24  -AE 10/21/24  -AE     PT Goal Re-Cert Due Date 10/28/24  -AE 10/28/24  -AE        Time Calculation- PT    Total Timed Code Minutes- PT -- 38 minute(s)  -AE        Timed Charges    96079 - PT Therapeutic Activity Minutes -- 38  -AE        Total Minutes    Timed Charges Total Minutes -- 38  -AE      Total Minutes -- 38  -AE               User Key  (r) = Recorded By, (t) = Taken By, (c) = Cosigned By      Initials Name Provider Type    AE Mable Tiwari PTA Physical Therapist Assistant                  Therapy Charges for Today       Code Description Service Date Service Provider Modifiers Qty    44125512838 HC PT THERAPEUTIC ACT EA 15 MIN 10/21/2024 Mable Tiwari PTA GP 3    94873988660 HC PT NEG PRESS WOUND TO 50SQCM DME3  10/21/2024 Mable Tiwari, PTA GP 1            PT G-Codes  Outcome Measure Options: AM-PAC 6 Clicks Basic Mobility (PT)  AM-PAC 6 Clicks Score (PT): 6    Mable Tiwari, PTA  10/21/2024

## 2024-10-21 NOTE — PROGRESS NOTES
"1         [facility]  INPATIENT PROGRESS NOTE    Patient Name: Sofía Clifton  Date of Admission: 10/16/2024  Today's Date: 10/21/24  Length of Stay: 5  Primary Care Physician: Sukhi Pelayo MD    Subjective   Chief Complaint: Follow-up  HPI   She is on day 5 of hospitalization  She presented with right hip wound  Record indicates history of dementia, hypertension, chronic constipation    Daughter-in-law at bedside with me  I been informed that request for hospital bed been in place but will not be available until few days from now.    Daughter-in-law told me \"we want the best for her\".  They are not ready to take her home until trouble medical equipment available.    Patient states that she is not feeling too well.  Could not give further details.  Otherwise, hemodynamically stable and nontoxic-appearing  Carries history of dementia        Review of Systems  Limited information from patient given dementia  All pertinent negatives and positives are as above. All other systems have been reviewed and are negative unless otherwise stated.     Objective    Temp:  [97.7 °F (36.5 °C)-99.2 °F (37.3 °C)] 97.8 °F (36.6 °C)  Heart Rate:  [74-84] 76  Resp:  [16] 16  BP: (115-147)/(40-86) 115/40  Physical Exam  Nontoxic-appearing  Has a pure wick  Noted presence of wound VAC right sides probably area of right iliac bone  Surprisingly, I did not see any other areas to suggest pressure ulcerations.  I am told by nurse Pauly that she has predilection to position herself laying on right lateral decubitus  GEN: Awake, alert, interactive, in NAD  HEENT:  PERRLA, EOMI, Anicteric, Trachea midline  Lungs: no wheezing/rales/rhonchi  Heart: RRR, +S1/s2, no rub  ABD: soft, nt/nd, +BS, no guarding/rebound  Extremities: no cyanosis, no pitting edema  Skin: wound vac in place to R hip/iliac crest  Neuro:  no obvious focal deficits   Appropriate affect      Results Review:  I have " reviewed the labs, radiology results, and diagnostic studies.    Laboratory Data:   Results from last 7 days   Lab Units 10/19/24  0301 10/18/24  0517 10/17/24  0438 10/16/24  1549   WBC 10*3/mm3  --  10.39 11.32* 11.89*   HEMOGLOBIN g/dL 8.1* 8.0* 8.6* 8.6*   HEMATOCRIT % 25.4* 25.2* 26.6* 26.4*   PLATELETS 10*3/mm3  --  205 195 198        Results from last 7 days   Lab Units 10/21/24  0617 10/20/24  0354 10/19/24  0301   SODIUM mmol/L 130* 132* 134*   POTASSIUM mmol/L 3.7 3.7 3.1*   CHLORIDE mmol/L 101 103 100   CO2 mmol/L 20.0* 21.0* 20.0*   BUN mg/dL 14 18 30*   CREATININE mg/dL 0.97 1.17* 1.44*   CALCIUM mg/dL 8.2* 8.4* 8.3*   BILIRUBIN mg/dL 0.5 0.5 0.3   ALK PHOS U/L 117 130* 143*   ALT (SGPT) U/L 196* 245* 272*   AST (SGOT) U/L 80* 102* 115*   GLUCOSE mg/dL 88 90 103*       Culture Data:   Blood Culture   Date Value Ref Range Status   10/16/2024 No growth at 4 days  Preliminary   10/16/2024 No growth at 4 days  Preliminary     Wound Culture   Date Value Ref Range Status   10/17/2024 Scant growth (1+) Enterococcus faecalis (A)  Final   10/16/2024 Light growth (2+) Enterococcus faecalis (A)  Final       Radiology Data:   Imaging Results (Last 24 Hours)       ** No results found for the last 24 hours. **            I have reviewed the patient's current medications.     Assessment/Plan   Assessment  Active Hospital Problems    Diagnosis     **ARF (acute renal failure)     Moderate malnutrition     Fecal impaction     Pressure ulcer, hip, right, unstageable     Infected wound     Abnormal LFTs     Advanced age     Acute urinary retention     Pneumonia due to human metapneumovirus     Chronic constipation     Anemia          Medical Decision Making  Number and Complexity of problems:   Problem list  Acute kidney injury  Pressure wound infection status postdebridement and washout on October 17.  Placement of wound VAC.  Culture showed Enterococcus.  Initially treated with Vanco and Zosyn and now changed to  amoxicillin  Chronic anemia with fairly stable H&H and without signs of bleeding  Transaminitis-improving  Fecal impaction-status post bowel movement and continued bowel regimen  Hypokalemia-resolved  Hyponatremia-monitor.  Consider further workup if persistently dropping.        Treatment Plan  Continue present management  Requested DME  Continue wound VAC  Discussed with   Meds reviewed  amoxicillin, 500 mg, Oral, Q8H  senna-docusate sodium, 2 tablet, Oral, BID   And  polyethylene glycol, 17 g, Oral, Daily   And  bisacodyl, 5 mg, Oral, Daily   And  bisacodyl, 10 mg, Rectal, Daily  citalopram, 20 mg, Oral, Daily  [Held by provider] clopidogrel, 75 mg, Oral, Daily  ipratropium-albuterol, 3 mL, Nebulization, BID - RT  levothyroxine, 75 mcg, Oral, Q AM  multivitamin with minerals, 1 tablet, Oral, Daily  sodium chloride, 10 mL, Intravenous, Q12H          Conditions and Status      Fair  MDM Data  External documents reviewed: [facility] with [facility]  Cardiac tracing (EKG, telemetry) interpretation: -  Radiology interpretation: CT of the abdomen pelvis dated October 16 describes skin ulceration in the right pelvic region lateral to the ileum with 5.6 x 3.1 collection that is not simple fluid likely an inflammatory mass/phlegmon.  No right ilium erosion or destruction to suggest osteomyelitis.  Increased liver density.  3.5 left renal cysts.  Gastric wall thickening due to underdistention.  Diverticulosis  Fecal impaction    Labs reviewed: y; as outlined above  Any tests that were considered but not ordered: None     Decision rules/scores evaluated (example [id number], [place], etc): None     Discussed with: social service and nurse with patient's daughter-in-law at bedside.     Care Planning  Shared decision making: Patient with family  Code status and discussions: No CPR  I confirmed that the patient's advanced care plan is present, code status is documented, and a surrogate decision maker is listed in the  patient's medical record.    Disposition  Social Determinants of Health that impact treatment or disposition: None identified at this time  I expect the patient to be discharged to (awaiting availability of DME).   Stable for discharge  Electronically signed by Humberto Guillen MD, 10/21/24, 14:57 CDT.

## 2024-10-21 NOTE — PROGRESS NOTES
"Pharmacy Renal Dose Conversion    Sofía Clifton is a 89 y.o. year old female  160 cm (62.99\") 48.7 kg (107 lb 5.8 oz)    Estimated Creatinine Clearance: 31.4 mL/min (by C-G formula based on SCr of 0.97 mg/dL).   Creatinine   Date Value Ref Range Status   10/21/2024 0.97 0.57 - 1.00 mg/dL Final   10/20/2024 1.17 (H) 0.57 - 1.00 mg/dL Final   10/19/2024 1.44 (H) 0.57 - 1.00 mg/dL Final   01/24/2020 1.10 0.60 - 1.30 mg/dL Final   09/11/2019 0.8 0.5 - 0.9 mg/dL Final   09/10/2019 1.0 (H) 0.5 - 0.9 mg/dL Final       PLAN  Based on prescribing information provided by the drug , Amoxicillin 500 mg po Q12H,  has been changed to Amoxicillin 500 mg po Q8H    Adjusted per the directives and guidelines established by [facility] Pharmacy and Therapeutics Committee and [facility] Medical Executive Committee.  Pharmacy will continue to monitor daily and make further adjustment(s) accordingly.    David Prado, PharmD  10/21/2413:10 CDT    "

## 2024-10-21 NOTE — CASE MANAGEMENT/SOCIAL WORK
Continued Stay Note   [place]     Patient Name: Sofía Clifton  MRN: [id number]  Today's Date: 10/21/2024    Admit Date: 10/16/2024    Plan: Home Health   Discharge Plan       Row Name 10/21/24 1213       Plan    Plan Home Health    Plan Comments Updated documentation and orders sent to [facility] so they can work on auth.                   Discharge Codes    No documentation.                       PABLO Trevino

## 2024-10-22 LAB
BACTERIA SPEC ANAEROBE CULT: ABNORMAL
OSMOLALITY SERPL: 268 MOSM/KG (ref 280–301)
OSMOLALITY UR: 379 MOSM/KG (ref 50–1400)
SODIUM UR-SCNC: 47 MMOL/L
TSH SERPL DL<=0.05 MIU/L-ACNC: 6.67 UIU/ML (ref 0.27–4.2)
URATE SERPL-MCNC: 2.4 MG/DL (ref 2.4–5.7)

## 2024-10-22 PROCEDURE — 97530 THERAPEUTIC ACTIVITIES: CPT

## 2024-10-22 PROCEDURE — 83930 ASSAY OF BLOOD OSMOLALITY: CPT | Performed by: INTERNAL MEDICINE

## 2024-10-22 PROCEDURE — 83935 ASSAY OF URINE OSMOLALITY: CPT | Performed by: INTERNAL MEDICINE

## 2024-10-22 PROCEDURE — 84550 ASSAY OF BLOOD/URIC ACID: CPT | Performed by: INTERNAL MEDICINE

## 2024-10-22 PROCEDURE — 94664 DEMO&/EVAL PT USE INHALER: CPT

## 2024-10-22 PROCEDURE — 84300 ASSAY OF URINE SODIUM: CPT | Performed by: INTERNAL MEDICINE

## 2024-10-22 PROCEDURE — 94799 UNLISTED PULMONARY SVC/PX: CPT

## 2024-10-22 PROCEDURE — 94760 N-INVAS EAR/PLS OXIMETRY 1: CPT

## 2024-10-22 PROCEDURE — 84443 ASSAY THYROID STIM HORMONE: CPT | Performed by: INTERNAL MEDICINE

## 2024-10-22 RX ORDER — MEGESTROL ACETATE 40 MG/1
40 TABLET ORAL DAILY
Status: DISCONTINUED | OUTPATIENT
Start: 2024-10-22 | End: 2024-10-23 | Stop reason: HOSPADM

## 2024-10-22 RX ADMIN — IPRATROPIUM BROMIDE AND ALBUTEROL SULFATE 3 ML: .5; 3 SOLUTION RESPIRATORY (INHALATION) at 18:50

## 2024-10-22 RX ADMIN — CITALOPRAM HYDROBROMIDE 20 MG: 20 TABLET, FILM COATED ORAL at 08:27

## 2024-10-22 RX ADMIN — AMOXICILLIN 500 MG: 500 CAPSULE ORAL at 06:15

## 2024-10-22 RX ADMIN — AMOXICILLIN 500 MG: 500 CAPSULE ORAL at 20:33

## 2024-10-22 RX ADMIN — Medication 10 ML: at 20:33

## 2024-10-22 RX ADMIN — MEGESTROL ACETATE 40 MG: 40 TABLET ORAL at 15:45

## 2024-10-22 RX ADMIN — LEVOTHYROXINE SODIUM 75 MCG: 0.07 TABLET ORAL at 06:15

## 2024-10-22 RX ADMIN — IPRATROPIUM BROMIDE AND ALBUTEROL SULFATE 3 ML: .5; 3 SOLUTION RESPIRATORY (INHALATION) at 10:12

## 2024-10-22 RX ADMIN — Medication 1 TABLET: at 08:27

## 2024-10-22 RX ADMIN — Medication 10 ML: at 08:28

## 2024-10-22 RX ADMIN — AMOXICILLIN 500 MG: 500 CAPSULE ORAL at 15:45

## 2024-10-22 NOTE — PLAN OF CARE
Goal Outcome Evaluation:  Plan of Care Reviewed With: patient        Progress: no change  Outcome Evaluation: Nutrition follow up. Pt cont on a regular diet. Her appetite has varied per her family's report. She has consumed 32% of the last 7 meals. She is receiving magic cup supplements with lunch and dinner. Intake of the supplements has also been varied day to day. She will not drink No new weight to review. Will request/order an updated weight for comparison. 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 The wound vac has been approved for home, she is waiting for a hospital bed/mattress to be approved before she goes home. She is receiving a MVI with minerals daily. Cont to follow for further nutrition needs.

## 2024-10-22 NOTE — THERAPY TREATMENT NOTE
Acute Care - Physical Therapy Treatment Note  [facility]     Patient Name: Sofía Clifton  : 1935  MRN: [id number]  Today's Date: 10/22/2024   Onset of Illness/Injury or Date of Surgery: 10/17/24  Visit Dx:     ICD-10-CM ICD-9-CM   1. Acute renal failure, unspecified acute renal failure type  N17.9 584.9   2. Cellulitis of hip  L03.119 682.6   3. Pneumonia due to human metapneumovirus (hMPV)  J12.3 480.8   4. Pressure ulcer, hip, right, unstageable  L89.210 707.04     707.25   5. Impaired mobility [Z74.09]  Z74.09 799.89   6. Open wound [T14.8XXA]  T14.8XXA 879.8   7. Advanced age  R54 797     Patient Active Problem List   Diagnosis    Paroxysmal atrial fibrillation    Anemia    Benign essential hypertension    Gastroesophageal reflux disease with esophagitis    Diverticulitis of colon    Chronic constipation    Osteoarthritis    Degeneration of lumbar intervertebral disc    Hypercholesteremia    Pericardial cyst    Chest pain    Melena    Hypothyroidism (acquired)    Acute blood loss anemia - requiring transfusion    PAD (peripheral artery disease)    RLS (restless legs syndrome)    ARF (acute renal failure)    Pressure ulcer, hip, right, unstageable    Infected wound    Abnormal LFTs    Advanced age    Acute urinary retention    Pneumonia due to human metapneumovirus    Fecal impaction    Moderate malnutrition     Past Medical History:   Diagnosis Date    Anemia 2016    Benign essential hypertension 2016    Chronic constipation 2016    Degeneration of lumbar intervertebral disc 2016    Diverticulitis of colon 2016    Gastroesophageal reflux disease with esophagitis 2016    Hypercholesteremia 2016    Osteoarthritis 2016    Paroxysmal atrial fibrillation 2016     Past Surgical History:   Procedure Laterality Date    APPENDECTOMY      BACK SURGERY      C-SPINE, LUMBAR SPINE    BLADDER SURGERY      CHOLECYSTECTOMY      ENDOSCOPY N/A 2023    Distal  Esophagitis LA Grade A otherwise normal exam    ENDOSCOPY  09/29/2015    Non-bleeding erosive gastropathy    HYSTERECTOMY      JOINT REPLACEMENT Right     HIP    KNEE SURGERY Right     TOTAL HIP ARTHROPLASTY Left     WOUND DEBRIDEMENT Right 10/17/2024    Procedure: DEBRIDEMENT OF RIGHT HIP WOUND, WOUND VAC PLACEMENT, FECAL DISIMPACTION;  Surgeon: Wade Alegria MD;  Location: [facility] OR;  Service: General;  Laterality: Right;     PT Assessment (Last 12 Hours)       PT Evaluation and Treatment       Row Name 10/22/24 1126          Physical Therapy Time and Intention    Subjective Information complains of;pain  -AE     Document Type therapy note (daily note)  -AE     Mode of Treatment physical therapy  -AE       Row Name 10/22/24 1126          General Information    Existing Precautions/Restrictions fall  WV R hip  -AE       Row Name 10/22/24 1126          Pain    Pain Location hip  -AE     Pain Side/Orientation right  -AE     Response to Pain Interventions activity participation with tolerable pain  -AE       Row Name 10/22/24 1126          Pain Scale: FACES Pre/Post-Treatment    Pain: FACES Scale, Pretreatment 0-->no hurt  -AE     Posttreatment Pain Rating 4-->hurts little more  -AE       Row Name 10/22/24 1126          Bed Mobility    Supine-Sit [place] (Bed Mobility) moderate assist (50% patient effort);verbal cues  -AE     Sit-Supine [place] (Bed Mobility) maximum assist (25% patient effort);verbal cues  -AE       Row Name 10/22/24 1126          Sit-Stand Transfer    Sit-Stand [place] (Transfers) maximum assist (25% patient effort);2 person assist  stood x 3  -AE       Row Name 10/22/24 1126          Stand-Sit Transfer    Stand-Sit [place] (Transfers) moderate assist (50% patient effort);2 person assist  -AE       Row Name 10/22/24 1126          Balance    Dynamic Sitting Balance contact guard;standby assist  -AE       Row Name 10/22/24 1126          Knee (Therapeutic Exercise)    Knee  (Therapeutic Exercise) AROM (active range of motion)  -AE     Knee AROM (Therapeutic Exercise) SLR (straight leg raise);20 repititions;left;10 repetitions;right  -AE       Row Name 10/22/24 1126          Ankle (Therapeutic Exercise)    Ankle (Therapeutic Exercise) AROM (active range of motion)  -AE     Ankle AROM (Therapeutic Exercise) dorsiflexion;plantarflexion;10 repetitions  -AE       Row Name             Wound 10/16/24 2147 Bilateral gluteal    Wound - Properties Group Placement Date: 10/16/24  -SC Placement Time: 2147 -SC Side: Bilateral  -SC Location: gluteal  -SC Present on Original Admission: Y  -SC    Retired Wound - Properties Group Placement Date: 10/16/24  -SC Placement Time: 2147 -SC Present on Original Admission: Y  -SC Side: Bilateral  -SC Location: gluteal  -SC    Retired Wound - Properties Group Placement Date: 10/16/24  -SC Placement Time: 2147 -SC Present on Original Admission: Y  -SC Side: Bilateral  -SC Location: gluteal  -SC    Retired Wound - Properties Group Date first assessed: 10/16/24  -SC Time first assessed: 2147 -SC Present on Original Admission: Y  -SC Side: Bilateral  -SC Location: gluteal  -SC      Row Name             Wound 10/16/24 2000 Right hip    Wound - Properties Group Placement Date: 10/16/24  -SC Placement Time: 2000 -SC Side: Right  -SC Location: hip  -SC    Retired Wound - Properties Group Placement Date: 10/16/24  -SC Placement Time: 2000 -SC Side: Right  -SC Location: hip  -SC    Retired Wound - Properties Group Placement Date: 10/16/24  -SC Placement Time: 2000 -SC Side: Right  -SC Location: hip  -SC    Retired Wound - Properties Group Date first assessed: 10/16/24  -SC Time first assessed: 2000  -SC Side: Right  -SC Location: hip  -SC      Row Name             Wound 10/17/24 1038 Right anterior greater trochanter surgical    Wound - Properties Group Placement Date: 10/17/24  -SB Placement Time: 1038  -SB Side: Right  -SB Orientation: anterior  -SB Location:  greater trochanter  -SB Primary Wound Type: Incision  -SB Type: surgical  -SB Present on Original Admission: Y  -SB    Retired Wound - Properties Group Placement Date: 10/17/24  -SB Placement Time: 1038  -SB Present on Original Admission: Y  -SB Side: Right  -SB Orientation: anterior  -SB Location: greater trochanter  -SB Primary Wound Type: Incision  -SB Type: surgical  -SB    Retired Wound - Properties Group Placement Date: 10/17/24  -SB Placement Time: 1038  -SB Present on Original Admission: Y  -SB Side: Right  -SB Orientation: anterior  -SB Location: greater trochanter  -SB Primary Wound Type: Incision  -SB Type: surgical  -SB    Retired Wound - Properties Group Date first assessed: 10/17/24  -SB Time first assessed: 1038  -SB Present on Original Admission: Y  -SB Side: Right  -SB Location: greater trochanter  -SB Primary Wound Type: Incision  -SB Type: surgical  -SB      Row Name             NPWT (Negative Pressure Wound Therapy) 10/17/24 right trocar    NPWT (Negative Pressure Wound Therapy) - Properties Group Placement Date: 10/17/24  - Location: right trocar  -SH Additional Comments[facility]    Retired NPWT (Negative Pressure Wound Therapy) - Properties Group Placement Date: 10/17/24  - Location: right trocar  -SH Additional Comments: presetn upon arrival to PACU  [facility]    Retired NPWT (Negative Pressure Wound Therapy) - Properties Group Placement Date: 10/17/24  - Location: right trocar  -SH Additional Comments: presetn upon arrival to PACU  [facility]    Retired NPWT (Negative Pressure Wound Therapy) - Properties Group Placement Date: 10/17/24  - Location: right trocar  -SH Additional Comments: presetn [facility]      Row Name 10/22/24 1126          Positioning and Restraints    Pre-Treatment Position in bed  -AE     Post Treatment Position bed  -AE     In Bed fowlers;call light within reach  -AE               User Key  (r) = Recorded By, (t) = Taken By, (c) =  Cosigned By      Initials Name Provider Type    AE Mable Tiwari, PTA Physical Therapist Assistant    Shea Wiggins, RN Registered Nurse    Jacquelyn Whitaker, RN Registered Nurse    Rosemarie Shepherd, RN Registered Nurse                    Physical Therapy Education       Title: PT OT SLP Therapies (In Progress)       Topic: Physical Therapy (In Progress)       Point: Mobility training (Done)       Learning Progress Summary            Patient Acceptance, E, VU by AE at 10/22/2024 1200    Comment: t/f's,prevention of new pressure areas with off loading and turns every 2 hours.    Acceptance, E, NR by SB at 10/18/2024 1508    Comment: edu on wound vac, alarm system, pressure relief    Acceptance, E,D, VU,DU,NR by BL at 10/18/2024 1020    Comment: Educated on body mechanics to perform functional mobility more easily. Educated on the importance of repositioning to prevent pressure injuries.   Family Acceptance, E, VU by AE at 10/22/2024 1200    Comment: t/f's,prevention of new pressure areas with off loading and turns every 2 hours.    Acceptance, E,D, VU,DU,NR by BL at 10/18/2024 1020    Comment: Educated on body mechanics to perform functional mobility more easily. Educated on the importance of repositioning to prevent pressure injuries.                      Point: Body mechanics (In Progress)       Learning Progress Summary            Patient Acceptance, E, NR by SB at 10/18/2024 1508    Comment: edu on wound vac, alarm system, pressure relief    Acceptance, E,D, VU,DU,NR by BL at 10/18/2024 1020    Comment: Educated on body mechanics to perform functional mobility more easily. Educated on the importance of repositioning to prevent pressure injuries.   Family Acceptance, E,D, VU,DU,NR by BL at 10/18/2024 1020    Comment: Educated on body mechanics to perform functional mobility more easily. Educated on the importance of repositioning to prevent pressure injuries.                      Point: Precautions (In  Progress)       Learning Progress Summary            Patient Acceptance, E, NR by SB at 10/18/2024 1508    Comment: edu on wound vac, alarm system, pressure relief    Acceptance, E,D, VU,DU,NR by BL at 10/18/2024 1020    Comment: Educated on body mechanics to perform functional mobility more easily. Educated on the importance of repositioning to prevent pressure injuries.   Family Acceptance, E,D, VU,DU,NR by BL at 10/18/2024 1020    Comment: Educated on body mechanics to perform functional mobility more easily. Educated on the importance of repositioning to prevent pressure injuries.                                      User Key       Initials Effective Dates Name Provider Type Discipline    AE 02/03/23 -  Mable Tiwari, PTA Physical Therapist Assistant PT    SB 07/11/23 -  Tita Diggs PT DPT Physical Therapist PT    BL 08/21/24 -  Jonel Ann, EVERETT Student PT Student PT                  PT Recommendation and Plan     Progress: improving  Outcome Evaluation: Pt agreed to PT and had pain with movement but was unable to rate.Pt was mod x1 supine to sit and she was CGA-SBA for seated balance.She was mod x 2 to stand HHA with pt's Son assisting.Pt stood x 3 reps.Pt wanted to keep legs crossed and had difficulty bring knees apart.Pt would benefit from continued rehab at SNF but pt plans to go home with family, HH and hospital bed.       Time Calculation:    PT Charges       Row Name 10/22/24 1203             Time Calculation    Start Time 1126  -AE      Stop Time 1154  -AE      Time Calculation (min) 28 min  -AE      PT Received On 10/22/24  -AE      PT Goal Re-Cert Due Date 10/28/24  -AE         Time Calculation- PT    Total Timed Code Minutes- PT 28 minute(s)  -AE         Timed Charges    24195 - PT Therapeutic Activity Minutes 28  -AE         Total Minutes    Timed Charges Total Minutes 28  -AE       Total Minutes 28  -AE                User Key  (r) = Recorded By, (t) = Taken By, (c) = Cosigned By      Initials  Name Provider Type    AE Mable Tiwari PTA Physical Therapist Assistant                  Therapy Charges for Today       Code Description Service Date Service Provider Modifiers Qty    56413548247 HC PT THERAPEUTIC ACT EA 15 MIN 10/21/2024 Mable Tiwari, ALEXIS GP 3    87721068816 HC PT NEG PRESS WOUND TO 50SQCM DME3 10/21/2024 Mable Tiwari PTA GP 1    90836554988 HC PT THERAPEUTIC ACT EA 15 MIN 10/22/2024 Mable Tiwari PTA GP 2            PT G-Codes  Outcome Measure Options: AM-PAC 6 Clicks Basic Mobility (PT)  AM-PAC 6 Clicks Score (PT): 10    Mable Tiwari PTA  10/22/2024

## 2024-10-22 NOTE — PROGRESS NOTES
"1         [facility] Medicine Services  INPATIENT PROGRESS NOTE    Patient Name: Sofía Clifton  Date of Admission: 10/16/2024  Today's Date: 10/22/24  Length of Stay: 6  Primary Care Physician: Sukhi Pelayo MD    Subjective   Chief Complaint: Follow-up  HPI   She is on day 5 of hospitalization  She presented with right hip wound  Record indicates history of dementia, hypertension, chronic constipation    Daughter-in-law at bedside with me  I been informed that request for hospital bed been in place but will not be available until few days from now.    Daughter-in-law told me \"we want the best for her\".  They are not ready to take her home until trouble medical equipment available.    Patient states that she is not feeling too well.  Could not give further details.  Otherwise, hemodynamically stable and nontoxic-appearing  Carries history of dementia    October 22.  No new event  No new complaints  Nurse Radha told me that patient barely eat or drink anything.  Family member at bedside  Informed them of decreasing serum sodium.  Informed them as well of current request for durable medical equipment.    Review of Systems  Limited information from patient given dementia  All pertinent negatives and positives are as above. All other systems have been reviewed and are negative unless otherwise stated.     Objective    Temp:  [98.2 °F (36.8 °C)-99.1 °F (37.3 °C)] 98.6 °F (37 °C)  Heart Rate:  [77-85] 78  Resp:  [16-18] 16  BP: (109-141)/(56-67) 134/59  Physical Exam  Nontoxic-appearing  Has a pure wick  Noted presence of wound VAC right sides probably area of right iliac bone    GEN: Awake, alert, interactive, in NAD  HEENT:  PERRLA, EOMI, Anicteric, Trachea midline  Lungs: no wheezing/rales/rhonchi  Heart: RRR, +S1/s2, no rub  ABD: soft, nt/nd, +BS, no guarding/rebound  Extremities: no cyanosis, no pitting edema  Skin: wound vac in place to R hip/iliac crest  Neuro:  no obvious " focal deficits   Appropriate affect  No significant change from yesterday's exam    Results Review:  I have reviewed the labs, radiology results, and diagnostic studies.    Laboratory Data:   Results from last 7 days   Lab Units 10/19/24  0301 10/18/24  0517 10/17/24  0438 10/16/24  1549   WBC 10*3/mm3  --  10.39 11.32* 11.89*   HEMOGLOBIN g/dL 8.1* 8.0* 8.6* 8.6*   HEMATOCRIT % 25.4* 25.2* 26.6* 26.4*   PLATELETS 10*3/mm3  --  205 195 198        Results from last 7 days   Lab Units 10/21/24  0617 10/20/24  0354 10/19/24  0301   SODIUM mmol/L 130* 132* 134*   POTASSIUM mmol/L 3.7 3.7 3.1*   CHLORIDE mmol/L 101 103 100   CO2 mmol/L 20.0* 21.0* 20.0*   BUN mg/dL 14 18 30*   CREATININE mg/dL 0.97 1.17* 1.44*   CALCIUM mg/dL 8.2* 8.4* 8.3*   BILIRUBIN mg/dL 0.5 0.5 0.3   ALK PHOS U/L 117 130* 143*   ALT (SGPT) U/L 196* 245* 272*   AST (SGOT) U/L 80* 102* 115*   GLUCOSE mg/dL 88 90 103*       Culture Data:   Blood Culture   Date Value Ref Range Status   10/16/2024 No growth at 4 days  Preliminary   10/16/2024 No growth at 4 days  Preliminary     Wound Culture   Date Value Ref Range Status   10/17/2024 Scant growth (1+) Enterococcus faecalis (A)  Final   10/16/2024 Light growth (2+) Enterococcus faecalis (A)  Final       Radiology Data:   Imaging Results (Last 24 Hours)       ** No results found for the last 24 hours. **            I have reviewed the patient's current medications.     Assessment/Plan   Assessment  Active Hospital Problems    Diagnosis     **ARF (acute renal failure)     Moderate malnutrition     Fecal impaction     Pressure ulcer, hip, right, unstageable     Infected wound     Abnormal LFTs     Advanced age     Acute urinary retention     Pneumonia due to human metapneumovirus     Chronic constipation     Anemia          Medical Decision Making  Number and Complexity of problems:   Problem list  Acute kidney injury  Pressure wound infection status postdebridement and washout on October 17.  Placement of  wound VAC.  Culture showed Enterococcus.  Initially treated with Vanco and Zosyn and now changed to amoxicillin  Chronic anemia with fairly stable H&H and without signs of bleeding  Transaminitis-improving  Fecal impaction-status post bowel movement and continued bowel regimen  Hypokalemia-resolved  Hyponatremia-monitor.  Consider further workup if persistently dropping.        Treatment Plan  Continue present management  Requested DME   Continue wound VAC    October 22.  Complete course of antibiotic  Discussed with social service  Durable medical equipment (bed) may take a while for it to be delivered as per discussion with social service  Hemodynamically stable  Check urine studies and blood work given hyponatremia  Strict intake and output.  Consider fluid restriction  Stable hemoglobin  Will also add appetite stimulant (Megace) as discussed with nurse Radha Richard reviewed  amoxicillin, 500 mg, Oral, Q8H  senna-docusate sodium, 2 tablet, Oral, BID   And  polyethylene glycol, 17 g, Oral, Daily   And  bisacodyl, 5 mg, Oral, Daily   And  bisacodyl, 10 mg, Rectal, Daily  citalopram, 20 mg, Oral, Daily  [Held by provider] clopidogrel, 75 mg, Oral, Daily  ipratropium-albuterol, 3 mL, Nebulization, BID - RT  levothyroxine, 75 mcg, Oral, Q AM  multivitamin with minerals, 1 tablet, Oral, Daily  sodium chloride, 10 mL, Intravenous, Q12H              Conditions and Status      Fair  MDM Data  External documents reviewed: Review[facility] with [facility]  Cardiac tracing (EKG, telemetry) interpretation: -  Radiology interpretation: CT of the abdomen pelvis dated October 16 describes skin ulceration in the right pelvic region lateral to the ileum with 5.6 x 3.1 collection that is not simple fluid likely an inflammatory mass/phlegmon.  No right ilium erosion or destruction to suggest osteomyelitis.  Increased liver density.  3.5 left renal cysts.  Gastric wall thickening due to underdistention.  Diverticulosis  Fecal  impaction    Labs reviewed: y; as outlined above  Any tests that were considered but not ordered: None     Decision rules/scores evaluated (example [id number], [place], etc): None     Discussed with: social service and nurse with patient's daughter-in-law at bedside.     Care Planning  Shared decision making: Patient with family  Code status and discussions: No CPR  I confirmed that the patient's advanced care plan is present, code status is documented, and a surrogate decision maker is listed in the patient's medical record.    Disposition  Social Determinants of Health that impact treatment or disposition: None identified at this time  I expect the patient to be discharged to (awaiting availability of DME).   Stable for discharge  Electronically signed by Humberto Guillen MD, 10/22/24, 13:44 CDT.

## 2024-10-22 NOTE — PLAN OF CARE
Goal Outcome Evaluation:  Plan of Care Reviewed With: patient, family        Progress: no change  Outcome Evaluation: Patient no c/o pain. Voiding. Wound vac to R Hip. COnfused to place time and situation.

## 2024-10-22 NOTE — PLAN OF CARE
Goal Outcome Evaluation:  Plan of Care Reviewed With: patient        Progress: improving  Outcome Evaluation: Pt agreed to PT and had pain with movement but was unable to rate.Pt was mod x1 supine to sit and she was CGA-SBA for seated balance.She was mod x 2 to stand HHA with pt's Son assisting.Pt stood x 3 reps.Pt wanted to keep legs crossed and had difficulty bring knees apart.Pt would benefit from continued rehab at SNF but pt plans to go home with family, HH and hospital bed.

## 2024-10-22 NOTE — PAYOR COMM NOTE
"10/21 CLINICAL  FAX  [phone]    Sofía Clifton (89 y.o. Female)       Date of Birth   1935    Social Security Number       Address   [address] [place] [address]    Home Phone   [phone]    MRN   [id number]       Adventism   Christianity    Marital Status                               Admission Date   10/16/24    Admission Type   Emergency    Admitting Provider   Humberto Guillen MD    Attending Provider   Humberto Guillen MD    Department, Room/Bed   [facility] 3C, 391/1       Discharge Date       Discharge Disposition       Discharge Destination                                 Attending Provider: Humberto Guillen MD    Allergies: Codeine, Morphine And Codeine, Norflex [Orphenadrine Citrate], Stadol [Butorphanol]    Isolation: Contact   Infection: Human Metapneumovirus  (10/16/24)   Code Status: No CPR    Ht: 160 cm (62.99\")   Wt: 48.7 kg (107 lb 5.8 oz)    Admission Cmt: None   Principal Problem: ARF (acute renal failure) [N17.9]                   Active Insurance as of 10/16/2024       Primary Coverage       Payor Plan Insurance Group Employer/Plan Group    ANTHEM MEDICARE REPLACEMENT ANTHEM MEDICARE ADVANTAGE [id number]       Payor Plan Address Payor Plan Phone Number Payor Plan Fax Number Effective Dates    [address] [phone]  1/1/2016 - None Entered    [facility] [address]         Subscriber Name Subscriber Birth Date Member ID       SOFÍA CLIFTON 1935 [id number]               Secondary Coverage       Payor Plan Insurance Group Employer/Plan Group     FOR LIFE  FOR LIFE MC SUP         Payor Plan Address Payor Plan Phone Number Payor Plan Fax Number Effective Dates    [address] [phone]  11/22/2016 - None Entered    [facility] [address]         Subscriber Name Subscriber Birth Date Member ID       SOFÍA CLIFTON 1935 [id number]                     Emergency Contacts        (Rel.) Home " Phone Work Phone Mobile Phone    Edwar Clifton (Son) -- -- [phone]    Meeta Haile (Daughter) [phone] -- [phone]              Current Facility-Administered Medications   Medication Dose Route Frequency Provider Last Rate Last Admin    acetaminophen (TYLENOL) tablet 650 mg  650 mg Oral Q6H PRN Wade Alegria MD   650 mg at 10/21/24 1056    aluminum-magnesium hydroxide-simethicone (MAALOX MAX) 400-400-40 MG/5ML suspension 15 mL  15 mL Oral Q6H PRN Wade Alegria MD        amoxicillin (AMOXIL) capsule 500 mg  500 mg Oral Q8H Humberto Guillen MD   500 mg at 10/21/24 2110    sennosides-docusate (PERICOLACE) 8.6-50 MG per tablet 2 tablet  2 tablet Oral BID Wade Alegria MD   2 tablet at 10/19/24 0929    And    polyethylene glycol (MIRALAX) packet 17 g  17 g Oral Daily Wade Alegria MD   17 g at 10/19/24 0931    And    bisacodyl (DULCOLAX) EC tablet 5 mg  5 mg Oral Daily Wade Alegria MD   5 mg at 10/19/24 0929    And    bisacodyl (DULCOLAX) suppository 10 mg  10 mg Rectal Daily Wade Alegria MD   10 mg at 10/18/24 0823    citalopram (CeleXA) tablet 20 mg  20 mg Oral Daily Wade Alegria MD   20 mg at 10/21/24 0823    [Held by provider] clopidogrel (PLAVIX) tablet 75 mg  75 mg Oral Daily Wade Alegria MD        ipratropium-albuterol (DUO-NEB) nebulizer solution 3 mL  3 mL Nebulization BID - RT Wade Alegria MD   3 mL at 10/21/24 2020    levothyroxine (SYNTHROID, LEVOTHROID) tablet 75 mcg  75 mcg Oral Q AM Wade Alegria MD   75 mcg at 10/21/24 0631    multivitamin with minerals 1 tablet  1 tablet Oral Daily Wade Alegria MD   1 tablet at 10/21/24 0823    ondansetron (ZOFRAN) injection 4 mg  4 mg Intravenous Q6H PRN Wade Alegria MD        sodium chloride 0.9 % flush 10 mL  10 mL Intravenous Q12H Wade Alegria MD   10 mL at 10/21/24 2110    sodium chloride 0.9 % flush 10 mL  10 mL Intravenous PRN Wade Alegria MD         Orders (last 24 hrs)        Start     Ordered    10/21/24 1400   "amoxicillin (AMOXIL) capsule 500 mg  Every 8 Hours Scheduled         10/21/24 1310    10/21/24 0000  Miscellaneous DME         10/21/24 1150    10/21/24 0000  Hospital Bed         10/21/24 1150    10/18/24 2100  amoxicillin (AMOXIL) capsule 500 mg  Every 12 Hours Scheduled,   Status:  Discontinued         10/18/24 1237    10/18/24 0900  polyethylene glycol (MIRALAX) packet 17 g  Daily        Placed in \"And\" Linked Group    10/17/24 1021    10/18/24 0900  bisacodyl (DULCOLAX) EC tablet 5 mg  Daily        Placed in \"And\" Linked Group    10/17/24 1021    10/17/24 2100  sennosides-docusate (PERICOLACE) 8.6-50 MG per tablet 2 tablet  2 Times Daily        Placed in \"And\" Linked Group    10/17/24 1021    10/17/24 1800  Dietary Nutrition Supplements Magic Cup  Daily With Lunch & Dinner       10/17/24 1557    10/17/24 1400  Incentive Spirometry  Every 4 Hours While Awake       10/17/24 1012    10/17/24 1300  bisacodyl (DULCOLAX) suppository 10 mg  Daily        Placed in \"And\" Linked Group    10/17/24 1021    10/17/24 1100  ipratropium-albuterol (DUO-NEB) nebulizer solution 3 mL  2 Times Daily - RT         10/17/24 1012    10/17/24 0952  Silicone Border Dressing to Bony Prominences  Every Shift      Comments: Apply silicone border foam dressing per protocol to sacral spine/bilateral heels for protection.  Nursing to change dressing every 3 days and PRN if soiled. Nursing is to peel back dressing with every assessment to assess skin underneath dressing. No barrier cream under dressing.    10/17/24 0952    10/17/24 0900  citalopram (CeleXA) tablet 20 mg  Daily         10/16/24 1950    10/17/24 0800  Oral Care  2 Times Daily       10/16/24 1950    10/17/24 0600  levothyroxine (SYNTHROID, LEVOTHROID) tablet 75 mcg  Every Early Morning         10/16/24 1950    10/16/24 2100  sodium chloride 0.9 % flush 10 mL  Every 12 Hours Scheduled         10/16/24 1950    10/16/24 2006  [Held by provider]  clopidogrel (PLAVIX) tablet 75 mg  " "Daily        (On hold since Wed 10/16/2024 at 1950 until manually unheld; held by Gilbert Palmer MDHold Reason: Other (Comment Required))    10/16/24 1950    10/16/24 2006  multivitamin with minerals 1 tablet  Daily         10/16/24 1950    10/16/24 2000  Vital Signs  Every 4 Hours       10/16/24 1950    10/16/24 1950  Intake & Output  Every Shift       10/16/24 1950    10/16/24 1950  sodium chloride 0.9 % flush 10 mL  As Needed         10/16/24 1950    10/16/24 1950  acetaminophen (TYLENOL) tablet 650 mg  Every 6 Hours PRN         10/16/24 1950    10/16/24 1950  ondansetron (ZOFRAN) injection 4 mg  Every 6 Hours PRN         10/16/24 1950    10/16/24 1950  aluminum-magnesium hydroxide-simethicone (MAALOX MAX) 400-400-40 MG/5ML suspension 15 mL  Every 6 Hours PRN         10/16/24 1950    Unscheduled  Wound Care  As Needed       10/17/24 0952    --  pantoprazole (PROTONIX) 40 MG EC tablet  Daily         10/17/24 1506    --  ondansetron ODT (ZOFRAN-ODT) 4 MG disintegrating tablet  Every 8 Hours PRN         10/17/24 1507    --  potassium chloride 10 MEQ CR tablet  Daily         10/17/24 1507    --  spironolactone (ALDACTONE) 25 MG tablet  Daily         10/17/24 1507                     Physician Progress Notes (last 48 hours)        Humberto Guillen MD at 10/21/24 1456          1         [facility] Medicine Services  INPATIENT PROGRESS NOTE    Patient Name: Sofía Clifton  Date of Admission: 10/16/2024  Today's Date: 10/21/24  Length of Stay: 5  Primary Care Physician: Sukhi Pelayo MD    Subjective   Chief Complaint: Follow-up  HPI   She is on day 5 of hospitalization  She presented with right hip wound  Record indicates history of dementia, hypertension, chronic constipation    Daughter-in-law at bedside with me  I been informed that request for hospital bed been in place but will not be available until few days from now.    Daughter-in-law told me \"we want the best " "for her\".  They are not ready to take her home until trouble medical equipment available.    Patient states that she is not feeling too well.  Could not give further details.  Otherwise, hemodynamically stable and nontoxic-appearing  Carries history of dementia        Review of Systems  Limited information from patient given dementia  All pertinent negatives and positives are as above. All other systems have been reviewed and are negative unless otherwise stated.     Objective    Temp:  [97.7 °F (36.5 °C)-99.2 °F (37.3 °C)] 97.8 °F (36.6 °C)  Heart Rate:  [74-84] 76  Resp:  [16] 16  BP: (115-147)/(40-86) 115/40  Physical Exam  Nontoxic-appearing  Has a pure wick  Noted presence of wound VAC right sides probably area of right iliac bone  Surprisingly, I did not see any other areas to suggest pressure ulcerations.  I am told by nurse Pauly that she has predilection to position herself laying on right lateral decubitus  GEN: Awake, alert, interactive, in NAD  HEENT:  PERRLA, EOMI, Anicteric, Trachea midline  Lungs: no wheezing/rales/rhonchi  Heart: RRR, +S1/s2, no rub  ABD: soft, nt/nd, +BS, no guarding/rebound  Extremities: no cyanosis, no pitting edema  Skin: wound vac in place to R hip/iliac crest  Neuro:  no obvious focal deficits   Appropriate affect      Results Review:  I have reviewed the labs, radiology results, and diagnostic studies.    Laboratory Data:   Results from last 7 days   Lab Units 10/19/24  0301 10/18/24  0517 10/17/24  0438 10/16/24  1549   WBC 10*3/mm3  --  10.39 11.32* 11.89*   HEMOGLOBIN g/dL 8.1* 8.0* 8.6* 8.6*   HEMATOCRIT % 25.4* 25.2* 26.6* 26.4*   PLATELETS 10*3/mm3  --  205 195 198        Results from last 7 days   Lab Units 10/21/24  0617 10/20/24  0354 10/19/24  0301   SODIUM mmol/L 130* 132* 134*   POTASSIUM mmol/L 3.7 3.7 3.1*   CHLORIDE mmol/L 101 103 100   CO2 mmol/L 20.0* 21.0* 20.0*   BUN mg/dL 14 18 30*   CREATININE mg/dL 0.97 1.17* 1.44*   CALCIUM mg/dL 8.2* 8.4* 8.3* "   BILIRUBIN mg/dL 0.5 0.5 0.3   ALK PHOS U/L 117 130* 143*   ALT (SGPT) U/L 196* 245* 272*   AST (SGOT) U/L 80* 102* 115*   GLUCOSE mg/dL 88 90 103*       Culture Data:   Blood Culture   Date Value Ref Range Status   10/16/2024 No growth at 4 days  Preliminary   10/16/2024 No growth at 4 days  Preliminary     Wound Culture   Date Value Ref Range Status   10/17/2024 Scant growth (1+) Enterococcus faecalis (A)  Final   10/16/2024 Light growth (2+) Enterococcus faecalis (A)  Final       Radiology Data:   Imaging Results (Last 24 Hours)       ** No results found for the last 24 hours. **            I have reviewed the patient's current medications.     Assessment/Plan   Assessment  Active Hospital Problems    Diagnosis     **ARF (acute renal failure)     Moderate malnutrition     Fecal impaction     Pressure ulcer, hip, right, unstageable     Infected wound     Abnormal LFTs     Advanced age     Acute urinary retention     Pneumonia due to human metapneumovirus     Chronic constipation     Anemia          Medical Decision Making  Number and Complexity of problems:   Problem list  Acute kidney injury  Pressure wound infection status postdebridement and washout on October 17.  Placement of wound VAC.  Culture showed Enterococcus.  Initially treated with Vanco and Zosyn and now changed to amoxicillin  Chronic anemia with fairly stable H&H and without signs of bleeding  Transaminitis-improving  Fecal impaction-status post bowel movement and continued bowel regimen  Hypokalemia-resolved  Hyponatremia-monitor.  Consider further workup if persistently dropping.        Treatment Plan  Continue present management  Requested DME  Continue wound VAC  Discussed with   Meds reviewed  amoxicillin, 500 mg, Oral, Q8H  senna-docusate sodium, 2 tablet, Oral, BID   And  polyethylene glycol, 17 g, Oral, Daily   And  bisacodyl, 5 mg, Oral, Daily   And  bisacodyl, 10 mg, Rectal, Daily  citalopram, 20 mg, Oral, Daily  [Held by provider]  clopidogrel, 75 mg, Oral, Daily  ipratropium-albuterol, 3 mL, Nebulization, BID - RT  levothyroxine, 75 mcg, Oral, Q AM  multivitamin with minerals, 1 tablet, Oral, Daily  sodium chloride, 10 mL, Intravenous, Q12H          Conditions and Status      [facility]  [facility] Data  External documents reviewed: Reviewed with records from [facility]  Cardiac tracing (EKG, telemetry) interpretation: -  Radiology interpretation: CT of the abdomen pelvis dated October 16 describes skin ulceration in the right pelvic region lateral to the ileum with 5.6 x 3.1 collection that is not simple fluid likely an inflammatory mass/phlegmon.  No right ilium erosion or destruction to suggest osteomyelitis.  Increased liver density.  3.5 left renal cysts.  Gastric wall thickening due to underdistention.  Diverticulosis  Fecal impaction    Labs reviewed: y; as outlined above  Any tests that were considered but not ordered: None     Decision rules/scores evaluated (example [id number], Wells, etc): None     Discussed with: social service and nurse with patient's daughter-in-law at bedside.     Care Planning  Shared decision making: Patient with family  Code status and discussions: No CPR  I confirmed that the patient's advanced care plan is present, code status is documented, and a surrogate decision maker is listed in the patient's medical record.    Disposition  Social Determinants of Health that impact treatment or disposition: None identified at this time  I expect the patient to be discharged to (awaiting availability of DME).   Stable for discharge  Electronically signed by Humberto Guillen MD, 10/21/24, 14:57 CDT.      Electronically signed by Humberto Guillen MD at 10/21/24 5027       Xavier Collins DO at 10/20/24 0955              [facility] Medicine Services  INPATIENT PROGRESS NOTE    Patient Name: Sofía Clifton  Date of Admission: 10/16/2024  Today's Date: 10/20/24  Length of Stay: 4  Primary  Care Physician: Sukhi Pelayo MD    Subjective   Chief Complaint: f/u wound/infection    HPI   Patient resting in bed.  Son at bedside.  Feels well.  No new complaints.  Afebrile.  Tolerating p.o. but has not been drinking a lot of fluids to last 24 hours per nursing and family.      Review of Systems   All pertinent negatives and positives are as above. All other systems have been reviewed and are negative unless otherwise stated.     Objective    Temp:  [98 °F (36.7 °C)-98.3 °F (36.8 °C)] 98 °F (36.7 °C)  Heart Rate:  [68-86] 86  Resp:  [16-18] 16  BP: (119-140)/(53-63) 119/53  Physical Exam  GEN: Awake, alert, interactive, in NAD  HEENT:  PERRLA, EOMI, Anicteric, Trachea midline  Lungs: no wheezing/rales/rhonchi  Heart: RRR, +S1/s2, no rub  ABD: soft, nt/nd, +BS, no guarding/rebound  Extremities: no cyanosis, no pitting edema  Skin: wound vac in place to R hip/iliac crest  Neuro: HAMILTON, no obvious focal deficits     Results Review:  I have reviewed the labs, radiology results, and diagnostic studies.    Laboratory Data:   Results from last 7 days   Lab Units 10/19/24  0301 10/18/24  0517 10/17/24  0438 10/16/24  1549   WBC 10*3/mm3  --  10.39 11.32* 11.89*   HEMOGLOBIN g/dL 8.1* 8.0* 8.6* 8.6*   HEMATOCRIT % 25.4* 25.2* 26.6* 26.4*   PLATELETS 10*3/mm3  --  205 195 198        Results from last 7 days   Lab Units 10/20/24  0354 10/19/24  0301 10/18/24  0517   SODIUM mmol/L 132* 134* 138   POTASSIUM mmol/L 3.7 3.1* 3.5   CHLORIDE mmol/L 103 100 104   CO2 mmol/L 21.0* 20.0* 22.0   BUN mg/dL 18 30* 35*   CREATININE mg/dL 1.17* 1.44* 1.56*   CALCIUM mg/dL 8.4* 8.3* 8.9   BILIRUBIN mg/dL 0.5 0.3 0.6   ALK PHOS U/L 130* 143* 131*   ALT (SGPT) U/L 245* 272* 339*   AST (SGOT) U/L 102* 115* 172*   GLUCOSE mg/dL 90 103* 108*       Culture Data:   Blood Culture   Date Value Ref Range Status   10/16/2024 No growth at 24 hours  Preliminary   10/16/2024 No growth at 24 hours  Preliminary     Wound Culture   Date Value Ref  Range Status   10/17/2024 Scant growth (1+) Enterococcus faecalis (A)  Final   10/16/2024 Light growth (2+) Enterococcus faecalis (A)  Final       Radiology Data:   Imaging Results (Last 24 Hours)       ** No results found for the last 24 hours. **            I have reviewed the patient's current medications.     Assessment/Plan   Assessment  Active Hospital Problems    Diagnosis     **ARF (acute renal failure)     Moderate malnutrition     Fecal impaction     Pressure ulcer, hip, right, unstageable     Infected wound     Abnormal LFTs     Advanced age     Acute urinary retention     Pneumonia due to human metapneumovirus     Chronic constipation     Anemia        Treatment Plan  #1 ANTONIO - continues to improve slowly, ivf now off, encourage po intake. Creat down to 1.17 and gfr 45. Avoid nephrotoxic meds.    #2 Pressure wound/infection - s/p OR on 10/17 for debridement and washout. No signs of osteo. Wound vac in place. Cultures growing Enterococcus faecalis.  Vanco and Zosyn changed to amoxicillin 10/18.     #3 human metapneumovirus -supportive care.  On room air.    #4 fecal impaction -status post BM.  Continue bowel regimen    #5 transaminitis -trending down.  Continue supportive care.  Unclear initial etiology?  CK level normal.  May have had mild rhabdo given degree of tissue wound/infection on arrival which has resolved.  Patient is not on statin    #6 chronic anemia -H&H fairly stable.  No signs of obvious bleeding.    #7 hypokalemia - improved with replacement    #8 hyponatremia - mild, 132, pt didn't drink much water yesterday, encourage po intake    Medical Decision Making  Number and Complexity of problems: 4-5 acute, multiple chronic  Differential Diagnosis: as above    Conditions and Status        Stable, interactive, does not appear toxic     MDM Data  External documents reviewed: none  Cardiac tracing (EKG, telemetry) interpretation: none  Radiology interpretation: reports reviewed  Labs reviewed: as  above  Any tests that were considered but not ordered: none     Decision rules/scores evaluated (example [id number], Wells, etc): none     Discussed with: patient, Dr. Alegria,      Care Planning  Shared decision making: Patient apprised of current labs, vitals, imaging and treatment plan.  They are agreeable with proceeding with plans as discussed.    Code status and discussions: dnr/dni    Disposition  Social Determinants of Health that impact treatment or disposition: none  I expect the patient to be discharged to likely home w/ HHS.  Therapies recommending STR/SNF but family would like home w/ HHS at d/c. Needs woundvac and specialty mattress if going home.  Social work team is working on orders/paperwork.    Electronically signed by Xavier Collins DO, 10/20/24, 09:26 CDT.      Electronically signed by Xavier Collins DO at 10/20/24 1102       Consult Notes (last 48 hours)  Notes from 10/20/24 0611 through 10/22/24 0611   No notes of this type exist for this encounter.

## 2024-10-22 NOTE — PLAN OF CARE
Problem: Adult Inpatient Plan of Care  Goal: Plan of Care Review  Outcome: Progressing  Flowsheets (Taken 10/22/2024 0501)  Progress: improving  Outcome Evaluation: VSS. Alert to self only. PW in place. Family at bedside. SCD. Q2turn. Precautions for metapneumovirus. IV R AC IID. PO abx. Wound vac to R hip. Call light within reach, safety maintained.  Plan of Care Reviewed With: patient

## 2024-10-23 ENCOUNTER — HOME HEALTH ADMISSION (OUTPATIENT)
Dept: HOME HEALTH SERVICES | Facility: HOME HEALTHCARE | Age: 89
End: 2024-10-23
Payer: MEDICARE

## 2024-10-23 ENCOUNTER — DOCUMENTATION (OUTPATIENT)
Dept: HOME HEALTH SERVICES | Facility: HOME HEALTHCARE | Age: 89
End: 2024-10-23
Payer: MEDICARE

## 2024-10-23 ENCOUNTER — READMISSION MANAGEMENT (OUTPATIENT)
Dept: CALL CENTER | Facility: HOSPITAL | Age: 89
End: 2024-10-23
Payer: MEDICARE

## 2024-10-23 ENCOUNTER — TRANSCRIBE ORDERS (OUTPATIENT)
Dept: HOME HEALTH SERVICES | Facility: HOME HEALTHCARE | Age: 89
End: 2024-10-23
Payer: MEDICARE

## 2024-10-23 VITALS
WEIGHT: 107.36 LBS | SYSTOLIC BLOOD PRESSURE: 103 MMHG | HEIGHT: 63 IN | RESPIRATION RATE: 16 BRPM | HEART RATE: 78 BPM | TEMPERATURE: 98.2 F | BODY MASS INDEX: 19.02 KG/M2 | DIASTOLIC BLOOD PRESSURE: 48 MMHG | OXYGEN SATURATION: 100 %

## 2024-10-23 DIAGNOSIS — E44.0 MODERATE MALNUTRITION: ICD-10-CM

## 2024-10-23 DIAGNOSIS — L89.219 PRESSURE INJURY OF SKIN OF RIGHT HIP, UNSPECIFIED INJURY STAGE: ICD-10-CM

## 2024-10-23 DIAGNOSIS — N17.9 ACUTE RENAL FAILURE, UNSPECIFIED ACUTE RENAL FAILURE TYPE: Primary | ICD-10-CM

## 2024-10-23 LAB
ANION GAP SERPL CALCULATED.3IONS-SCNC: 14 MMOL/L (ref 5–15)
BUN SERPL-MCNC: 13 MG/DL (ref 8–23)
BUN/CREAT SERPL: 15.1 (ref 7–25)
CALCIUM SPEC-SCNC: 8 MG/DL (ref 8.6–10.5)
CHLORIDE SERPL-SCNC: 99 MMOL/L (ref 98–107)
CO2 SERPL-SCNC: 16 MMOL/L (ref 22–29)
CREAT SERPL-MCNC: 0.86 MG/DL (ref 0.57–1)
EGFRCR SERPLBLD CKD-EPI 2021: 65.5 ML/MIN/1.73
GLUCOSE SERPL-MCNC: 103 MG/DL (ref 65–99)
POTASSIUM SERPL-SCNC: 4.1 MMOL/L (ref 3.5–5.2)
SODIUM SERPL-SCNC: 129 MMOL/L (ref 136–145)

## 2024-10-23 PROCEDURE — 97530 THERAPEUTIC ACTIVITIES: CPT

## 2024-10-23 PROCEDURE — 97605 NEG PRS WND THER DME<=50SQCM: CPT

## 2024-10-23 PROCEDURE — 94761 N-INVAS EAR/PLS OXIMETRY MLT: CPT

## 2024-10-23 PROCEDURE — 25010000002 ONDANSETRON PER 1 MG: Performed by: GENERAL PRACTICE

## 2024-10-23 PROCEDURE — 80048 BASIC METABOLIC PNL TOTAL CA: CPT | Performed by: INTERNAL MEDICINE

## 2024-10-23 PROCEDURE — 94799 UNLISTED PULMONARY SVC/PX: CPT

## 2024-10-23 RX ORDER — SODIUM BICARBONATE 650 MG/1
650 TABLET ORAL 2 TIMES DAILY
Status: DISCONTINUED | OUTPATIENT
Start: 2024-10-23 | End: 2024-10-23 | Stop reason: HOSPADM

## 2024-10-23 RX ORDER — SODIUM BICARBONATE 650 MG/1
650 TABLET ORAL 2 TIMES DAILY
Qty: 60 TABLET | Refills: 0 | Status: SHIPPED | OUTPATIENT
Start: 2024-10-23 | End: 2024-11-22

## 2024-10-23 RX ORDER — AMOXICILLIN 500 MG/1
500 CAPSULE ORAL EVERY 8 HOURS SCHEDULED
Qty: 8 CAPSULE | Refills: 0 | Status: SHIPPED | OUTPATIENT
Start: 2024-10-23 | End: 2024-10-26

## 2024-10-23 RX ORDER — LEVOTHYROXINE SODIUM 100 UG/1
100 TABLET ORAL
Qty: 30 TABLET | Refills: 0 | Status: SHIPPED | OUTPATIENT
Start: 2024-10-24

## 2024-10-23 RX ORDER — MEGESTROL ACETATE 40 MG/1
40 TABLET ORAL DAILY
Qty: 30 TABLET | Refills: 0 | Status: SHIPPED | OUTPATIENT
Start: 2024-10-24

## 2024-10-23 RX ORDER — ACETAMINOPHEN 325 MG/1
650 TABLET ORAL EVERY 6 HOURS PRN
Start: 2024-10-23

## 2024-10-23 RX ORDER — LEVOTHYROXINE SODIUM 100 UG/1
100 TABLET ORAL
Status: DISCONTINUED | OUTPATIENT
Start: 2024-10-24 | End: 2024-10-23 | Stop reason: HOSPADM

## 2024-10-23 RX ADMIN — MEGESTROL ACETATE 40 MG: 40 TABLET ORAL at 08:37

## 2024-10-23 RX ADMIN — SODIUM BICARBONATE 650 MG: 650 TABLET ORAL at 11:13

## 2024-10-23 RX ADMIN — LEVOTHYROXINE SODIUM 75 MCG: 0.07 TABLET ORAL at 06:34

## 2024-10-23 RX ADMIN — Medication 1 TABLET: at 08:37

## 2024-10-23 RX ADMIN — CITALOPRAM HYDROBROMIDE 20 MG: 20 TABLET, FILM COATED ORAL at 08:37

## 2024-10-23 RX ADMIN — IPRATROPIUM BROMIDE AND ALBUTEROL SULFATE 3 ML: .5; 3 SOLUTION RESPIRATORY (INHALATION) at 07:15

## 2024-10-23 RX ADMIN — AMOXICILLIN 500 MG: 500 CAPSULE ORAL at 06:34

## 2024-10-23 RX ADMIN — ONDANSETRON 4 MG: 2 INJECTION INTRAMUSCULAR; INTRAVENOUS at 11:57

## 2024-10-23 RX ADMIN — Medication 10 ML: at 08:41

## 2024-10-23 NOTE — PLAN OF CARE
Goal Outcome Evaluation:  Plan of Care Reviewed With: patient        Progress: improving  Outcome Evaluation: Pt performed bed mobility max A and sitting balance SBA-mod A with mostly mod A. Pt has limited motivation to improve at this time. Pt was AAROM-PROM for BLE with mostly PROM. Wound vac changed  with good seal obtained. Pt's daughter in law in room and instructed on home safety with wound and wound vac. Nsg stated they would change pt to home vac if she discharges today.

## 2024-10-23 NOTE — PROGRESS NOTES
Received HH referral from [facility] for SN, PT, OT. Dr. Pelayo will follow. Spoke with pts son, Edwar, who states they are agreeable to services. Pts daughter, Meeta, is primary contact for all appointments. Address is correct.

## 2024-10-23 NOTE — PLAN OF CARE
Problem: Adult Inpatient Plan of Care  Goal: Plan of Care Review  Outcome: Progressing  Flowsheets (Taken 10/23/2024 8115)  Progress: no change  Outcome Evaluation: VSS. Alert to self. Q2turn. Family at bedside. PW in place. Wound vac to R hip. No C/O pain. Call light within reach, safety maintained.  Plan of Care Reviewed With: patient

## 2024-10-23 NOTE — DISCHARGE SUMMARY
[facility] Medicine Services  DISCHARGE SUMMARY       Date of Admission: 10/16/2024  Date of Discharge:  10/23/2024  Primary Care Physician: Sukhi Pelayo MD    Presenting Problem/History of Present Illness:  Right hip wound  Final Discharge Diagnoses:  Active Hospital Problems    Diagnosis     **ARF (acute renal failure)     Moderate malnutrition     Fecal impaction     Pressure ulcer, hip, right, unstageable     Infected wound     Abnormal LFTs     Advanced age     Acute urinary retention     Pneumonia due to human metapneumovirus     Chronic constipation     Anemia        Consults:     General surgery     Procedures Performed:   10/17/2024     Pre-op Diagnosis:   Pressure ulcer, hip, right, unstageable [L89.210]     Post-op Diagnosis:     Post-Op Diagnosis Codes:     * Pressure ulcer, hip, right, unstageable [L89.210]     Procedure/CPT® Codes:        Procedure(s):  DEBRIDEMENT OF RIGHT HIP WOUND, WOUND VAC PLACEMENT, FECAL DISIMPACTION     Surgeon(s):  Wade Alegria MD    Pertinent Test Results:   Results for orders placed during the hospital encounter of 08/15/17    Adult Transthoracic Echo Complete With Contrast    Interpretation Summary  · Left ventricular systolic function is normal.  · Left ventricular diastolic dysfunction (grade I) consistent with impaired relaxation.  · No significant valvular abnormalities are identified.      Imaging Results (All)       Procedure Component Value Units Date/Time    CT Abdomen Pelvis Without Contrast [[id number]] Collected: 10/16/24 1713     Updated: 10/16/24 1723    Narrative:      EXAMINATION:  CT ABDOMEN PELVIS WO CONTRAST-  10/16/2024 3:56 PM     HISTORY: Right hip wound.     TECHNIQUE: Spiral CT was performed of the abdomen and pelvis without  contrast. Multiplanar images were reconstructed.     DLP: 175.78 mGy.cm Automated dosage reduction technique was utilized to  reduce patient dosage.     COMPARISON: 9/28/2015.      LUNG BASES: There is atelectasis in both lung bases. There is patchy  consolidation in the lingula. There is coronary artery calcification.  There is aortic and mitral valve calcification.     LIVER AND SPLEEN: The liver is very dense with a Hounsfield unit  measurement of 116. The spleen is normal density with multiple calcified  granulomas. There has been prior cholecystectomy.     PANCREAS: Normal unenhanced appearance of the pancreas.     KIDNEYS AND ADRENALS: The adrenal glands and right kidney are  unremarkable. There is a 3.5 cm left renal cyst with a Hounsfield  measurement of 11. The renal collecting systems are nondilated. There is  mild distention of the urinary bladder. There is air in the bladder.     BOWEL: No oral contrast was given. There is under distention of the  stomach. There is gastric wall thickening. Small bowel loops are  nondilated. There is moderate to large stool in the colon. There is  diverticulosis of the colon. There is fecal impaction of the rectum  measuring 8.9 cm transversely.     OTHER: There is atheromatous disease of the aortoiliac vessels. There  has been prior bilateral hip arthroplasty. There is narrowing and  spurring of the symphysis pubis. There are degenerative changes of the  spine. There is lumbar scoliosis convex to the left. There is skin  ulceration lateral to the right ilium. There is some air within the soft  tissues in this area. There is a phlegmon measuring about 5.6 x 3.1 cm.  Simple fluid is not seen in this area. This extends to the lateral  margin of the ilium. I do not see any iliac erosive change or  destruction to suggest osteomyelitis involving the right ilium.          Impression:      1. Skin ulceration in the right pelvic region lateral to the ileum with  a 5.6 x 3.1 cm collection that is not simple fluid. This is likely an  inflammatory mass/phlegmon. There is some air in the soft tissues in  this area. This abuts the lateral margin of the right  ilium. There is no  right ilium erosion or destruction to suggest osteomyelitis.  2. Atelectasis in the lung bases. Patchy lingular consolidation may  represent atelectasis or pneumonia. Coronary artery calcification.  Aortic and mitral valve calcification.  3. The liver is very dense. Hemachromatosis is considered. Increased  liver density may also be seen with chronic amiodarone therapy.  4. A 3.5 cm left renal cyst.  5. Mild distention of the urinary bladder. There is air in the urinary  bladder. Correlate with instrumentation.  6. Gastric wall thickening likely due to under distention. Gastritis and  other etiologies are also considered.  7. Diverticulosis of the colon. Fecal impaction of the rectum measuring  8.9 cm transversely.  8. Other nonacute findings, as discussed above.        The full report of this exam was immediately signed and available to the  emergency room. The patient is currently in the emergency room.     This report was signed and finalized on 10/16/2024 5:20 PM by Dr. Yunior Chanel MD.       XR Chest 1 View [[id number]] Collected: 10/16/24 1508     Updated: 10/16/24 1512    Narrative:      EXAM: XR CHEST 1 VW-      DATE: 10/16/2024 2:03 PM     HISTORY: Cough and congestion       COMPARISON: 8/15/2017.     TECHNIQUE:  Frontal view(s) of the chest submitted.     FINDINGS:    There are bilateral opacities which may be related to edema or  multifocal pneumonia. There is elevation of the right hemidiaphragm. No  effusion or pneumothorax is seen. There is calcification of the thoracic  aorta. There is bilateral glenohumeral joint osteoarthritis and AC joint  arthropathy.          Impression:         1. Bilateral opacities in the lungs may be due to edema or multifocal  pneumonia.     This report was signed and finalized on 10/16/2024 3:09 PM by Jose Hooper.             LAB RESULTS:      Lab 10/19/24  0301 10/18/24  0517 10/17/24  0438 10/16/24  1549   WBC  --  10.39 11.32* 11.89*    HEMOGLOBIN 8.1* 8.0* 8.6* 8.6*   HEMATOCRIT 25.4* 25.2* 26.6* 26.4*   PLATELETS  --  205 195 198   NEUTROS ABS  --   --  10.06* 11.17*   EOS ABS  --   --  0.00 0.00   MCV  --  103.7* 105.1* 103.5*   LACTATE  --   --   --  1.6   PROTIME  --   --  14.0  --          Lab 10/23/24  0927 10/22/24  1504 10/21/24  0617 10/20/24  0354 10/19/24  0301 10/18/24  0517   SODIUM 129*  --  130* 132* 134* 138   POTASSIUM 4.1  --  3.7 3.7 3.1* 3.5   CHLORIDE 99  --  101 103 100 104   CO2 16.0*  --  20.0* 21.0* 20.0* 22.0   ANION GAP 14.0  --  9.0 8.0 14.0 12.0   BUN 13  --  14 18 30* 35*   CREATININE 0.86  --  0.97 1.17* 1.44* 1.56*   EGFR 65.5  --  56.7* 45.3* 35.3* 32.0*   GLUCOSE 103*  --  88 90 103* 108*   CALCIUM 8.0*  --  8.2* 8.4* 8.3* 8.9   MAGNESIUM  --   --   --  1.9  --   --    TSH  --  6.670*  --   --   --   --          Lab 10/21/24  0617 10/20/24  0354 10/19/24  0301 10/18/24  0517 10/17/24  0438   TOTAL PROTEIN 6.2 6.3 5.9* 6.4 6.5   ALBUMIN 3.1* 3.1* 2.8* 3.3* 3.1*   GLOBULIN 3.1 3.2 3.1 3.1 3.4   ALT (SGPT) 196* 245* 272* 339* 445*   AST (SGOT) 80* 102* 115* 172* 243*   BILIRUBIN 0.5 0.5 0.3 0.6 0.6   ALK PHOS 117 130* 143* 131* 149*         Lab 10/17/24  0438   PROTIME 14.0   INR 1.04             Lab 10/17/24  0438 10/16/24  1549   FERRITIN  --  2,435.00*   FOLATE >20.00  --    VITAMIN B 12 >2,000*  --          Brief Urine Lab Results  (Last result in the past 365 days)        Color   Clarity   Blood   Leuk Est   Nitrite   Protein   CREAT   Urine HCG        10/17/24 0606 Yellow   Clear   Negative   Negative   Negative   Trace                 Microbiology Results (last 10 days)       Procedure Component Value - Date/Time    Anaerobic Culture - Surgical Site, Hip, Right [[id number]]  (Abnormal) Collected: 10/17/24 1045    Lab Status: Final result Specimen: Surgical Site from Hip, Right Updated: 10/22/24 1133     Anaerobic Culture Finegoldia magna    Wound Culture - Surgical Site, Hip, Right [[id number]]  (Abnormal)  Collected: 10/17/24 1045    Lab Status: Final result Specimen: Surgical Site from Hip, Right Updated: 10/18/24 0902     Wound Culture Scant growth (1+) Enterococcus faecalis     Gram Stain Moderate (3+) WBCs seen      Few (2+) Gram positive cocci    Narrative:      Refer to previous wound culture collected on 10/16/2024 1655 for MICs.    MRSA Screen, PCR (Inpatient) - Swab, Nares [[id number]]  (Normal) Collected: 10/17/24 0443    Lab Status: Final result Specimen: Swab from Nares Updated: 10/17/24 0611     MRSA PCR No MRSA Detected    Narrative:      The negative predictive value of this diagnostic test is high and should only be used to consider de-escalating anti-MRSA therapy. A positive result may indicate colonization with MRSA and must be correlated clinically.    Blood Culture - Blood, Arm, Left [[id number]]  (Normal) Collected: 10/16/24 1612    Lab Status: Final result Specimen: Blood from Arm, Left Updated: 10/21/24 1700     Blood Culture No growth at 5 days    Wound Culture - Wound, Hip, Right [[id number]]  (Abnormal)  (Susceptibility) Collected: 10/16/24 1555    Lab Status: Final result Specimen: Wound from Hip, Right Updated: 10/18/24 0815     Wound Culture Light growth (2+) Enterococcus faecalis     Gram Stain Many (4+) Gram positive cocci      Many (4+) Gram negative bacilli      Many (4+) WBCs seen    Susceptibility        Enterococcus faecalis      RENE      Ampicillin Susceptible      Vancomycin Susceptible                           Respiratory Panel PCR w/COVID-19(SARS-CoV-2) [facility] In-House, NP Swab in UTM/VTM, 2 HR TAT - Swab, Nasopharynx [[id number]]  (Abnormal) Collected: 10/16/24 1553    Lab Status: Final result Specimen: Swab from Nasopharynx Updated: 10/16/24 1710     ADENOVIRUS, PCR Not Detected     Coronavirus 229E Not Detected     Coronavirus HKU1 Not Detected     Coronavirus NL63 Not Detected     Coronavirus OC43 Not Detected     COVID19 Not Detected     Human Metapneumovirus  Detected     Human Rhinovirus/Enterovirus Not Detected     Influenza A PCR Not Detected     Influenza B PCR Not Detected     Parainfluenza Virus 1 Not Detected     Parainfluenza Virus 2 Not Detected     Parainfluenza Virus 3 Not Detected     Parainfluenza Virus 4 Not Detected     RSV, PCR Not Detected     Bordetella pertussis pcr Not Detected     Bordetella parapertussis PCR Not Detected     Chlamydophila pneumoniae PCR Not Detected     Mycoplasma pneumo by PCR Not Detected    Narrative:      In the setting of a positive respiratory panel with a viral infection PLUS a negative procalcitonin without other underlying concern for bacterial infection, consider observing off antibiotics or discontinuation of antibiotics and continue supportive care. If the respiratory panel is positive for atypical bacterial infection (Bordetella pertussis, Chlamydophila pneumoniae, or Mycoplasma pneumoniae), consider antibiotic de-escalation to target atypical bacterial infection.    Blood Culture - Blood, Arm, Right [[id number]]  (Normal) Collected: 10/16/24 1542    Lab Status: Final result Specimen: Blood from Arm, Right Updated: 10/21/24 1616     Blood Culture No growth at 5 days            Hospital Course:   I received information that the hospital bed has been delivered.  Patient also has wound VAC.  Durable medical equipment necessary has been in place.  Patient has been referred to home health.  Daughter-in-law at bedside made aware of the laboratory tests, medications and plan of care.  She verbalized adequate understanding and agreeable to current plan of care.  Laboratory today showed sodium of 129 with bicarb of 16.  I added sodium bicarbonate to her regimen and recommend follow-up basic metabolic panel in outpatient setting.  This can be done either through home health or through primary care provider in about a week.    Otherwise she is asymptomatic from this abnormality.  I encouraged her to increase p.o. intake and started  her on Megace on October 22.    In summary, patient was admitted for right hip wound and underwent  Surgical debridement and placement of wound VAC on right hip wound along with this she had fecal disimpaction.    The therapist was at bedside today.  She recently replaced the wound VAC seal.    Patient received antibiotic for wound culture showing Enterococcus faecalis.    She also was diagnosed with human metapneumovirus infection.  She was treated supportively and did not require any oxygen supplementation    She had elevated transaminase which is trending down.  Unclear of its etiology.  There was question of mild rhabdo given degree of tissue/wound infection on arrival.      She has chronic anemia.  She has elevated ferritin to suggest under utilization.  Her CT of the abdomen and pelvis indicates that the liver is very dense which hemochromatosis is considered.  Increased liver density may also be seen with chronic amiodarone therapy (apparently noted to be present in her list of medication prior to this admission that was not continued while in the hospital)    Her home medication noted to include amiodarone although she did not have any EKG on admit.  Record from July 8, 2023 showed normal sinus rhythm record from August 15, 2017 indicates normal sinus rhythm also.  From further reviewing her record she has paroxysmal atrial fibrillation.    I did not see any record of anticoagulant prescription in the last 365 days.  Her home medication initially held Plavix (history of coronary artery disease with CABG).  Her hemoglobin though has been stable at 8.6, 8 and 8.1 the past few days.  Patient has not had any bleeding.    I strongly encouraged them to follow closely with primary care provider to make sure medications remains appropriate for her while in the outpatient setting.    She had hypokalemia which is now resolved.    She has hypothyroidism which can also be a cause of hyponatremia.  I increased her  "levothyroxine.      Note that at some point during this hospitalization her creatinine was 1.72 and trended down possibly with fluid.  Her creatinine prior to discharge is 0.86.    Overall she remains hemodynamically stable and believed to be medically appropriate for discharge.  I answered all questions of daughter-in-law at bedside to the best of my abilities.  She verbalized adequate understanding.    Physical Exam on Discharge:  /48 (BP Location: Right arm, Patient Position: Lying)   Pulse 78   Temp 98.2 °F (36.8 °C)   Resp 16   Ht 160 cm (62.99\")   Wt 48.7 kg (107 lb 5.8 oz)   SpO2 100%   BMI 19.02 kg/m²   Physical Exam  Nontoxic-appearing  Has a pure wick  Noted presence of wound VAC right sides probably area of right iliac bone     GEN: Awake, alert, interactive, in NAD  HEENT:  PERRLA, EOMI, Anicteric, Trachea midline  Lungs: no wheezing/rales/rhonchi  Heart: RRR, +S1/s2, no rub  ABD: soft, nt/nd, +BS, no guarding/rebound  Extremities: no cyanosis, no pitting edema  Skin: wound vac in place to R hip/iliac crest  Neuro:  no obvious focal deficits   Appropriate affect  No significant change from yesterday's exam       Condition on Discharge:   stable    [place] [facility] [place] Medications:     Discharge Medications        New Medications        Instructions Start Date   acetaminophen 325 MG tablet  Commonly known as: TYLENOL   650 mg, Oral, Every 6 Hours PRN      amoxicillin 500 MG capsule  Commonly known as: AMOXIL   500 mg, Oral, Every 8 Hours Scheduled      megestrol 40 MG tablet  Commonly known as: MEGACE   40 mg, Oral, Daily   Start Date: October 24, 2024     sodium bicarbonate 650 MG tablet   650 mg, Oral, 2 Times Daily             Changes to Medications        Instructions Start Date   levothyroxine 100 MCG tablet  Commonly known as: SYNTHROID, LEVOTHROID  What changed:   medication strength  how much to take   100 mcg, Oral, Every Early Morning   Start " Date: October 24, 2024            Continue These Medications        Instructions Start Date   amiodarone 200 MG tablet  Commonly known as: PACERONE   1 tablet, 2 Times Daily      CeleXA 20 MG tablet  Generic drug: citalopram   20 mg, Oral, Daily      clopidogrel 75 MG tablet  Commonly known as: PLAVIX   75 mg, Oral, Daily      metoprolol tartrate 25 MG tablet  Commonly known as: LOPRESSOR   25 mg, 2 Times Daily      ondansetron ODT 4 MG disintegrating tablet  Commonly known as: ZOFRAN-ODT   4 mg, Translingual, Every 8 Hours PRN      pantoprazole 40 MG EC tablet  Commonly known as: PROTONIX   40 mg, Oral, Daily      pravastatin 40 MG tablet  Commonly known as: PRAVACHOL   40 mg, Daily      rOPINIRole 0.25 MG tablet  Commonly known as: REQUIP   0.25 mg, Nightly             Stop These Medications      CALCIUM 600/VITAMIN D3 PO     cetirizine 10 MG tablet  Commonly known as: zyrTEC     furosemide 40 MG tablet  Commonly known as: LASIX     hydrALAZINE 25 MG tablet  Commonly known as: APRESOLINE     HYDROcodone-acetaminophen  MG per tablet  Commonly known as: NORCO     isosorbide mononitrate 30 MG 24 hr tablet  Commonly known as: IMDUR     potassium chloride 10 MEQ CR tablet     spironolactone 25 MG tablet  Commonly known as: ALDACTONE              This patient has current or prior documentation of an left ventricular ejection fraction (LVEF) of less than or equal to 40%. - no information to based it upon.           Discharge Diet:   Diet Instructions       Diet: Regular/House Diet; Thin (IDDSI 0)      Discharge Diet: Regular/House Diet    Fluid Consistency: Thin (IDDSI 0)            Activity at Discharge:   Activity Instructions       Gradually Increase Activity Until at Pre-Hospitalization Level              Follow-up Appointments:   Future Appointments   Date Time Provider Department Center   11/26/2024  9:00 AM Wade Alegria MD [facility]       Test Results Pending at Discharge: none    Electronically  signed by Humberto Guillen MD, 10/23/24, 12:06 CDT.    Time: > 30  minutes.       I confirmed that the patient's advanced care plan is present, code status is documented, and a surrogate decision maker is listed in the patient's medical record.

## 2024-10-23 NOTE — THERAPY TREATMENT NOTE
Acute Care - Physical Therapy Treatment Note  [facility]     Patient Name: Sofía Clifton  : 1935  MRN: [id number]  Today's Date: 10/23/2024   Onset of Illness/Injury or Date of Surgery: 10/17/24  Visit Dx:     ICD-10-CM ICD-9-CM   1. Acute renal failure, unspecified acute renal failure type  N17.9 584.9   2. Cellulitis of hip  L03.119 682.6   3. Pneumonia due to human metapneumovirus (hMPV)  J12.3 480.8   4. Pressure ulcer, hip, right, unstageable  L89.210 707.04     707.25   5. Impaired mobility [Z74.09]  Z74.09 799.89   6. Open wound [T14.8XXA]  T14.8XXA 879.8   7. Advanced age  R54 797     Patient Active Problem List   Diagnosis    Paroxysmal atrial fibrillation    Anemia    Benign essential hypertension    Gastroesophageal reflux disease with esophagitis    Diverticulitis of colon    Chronic constipation    Osteoarthritis    Degeneration of lumbar intervertebral disc    Hypercholesteremia    Pericardial cyst    Chest pain    Melena    Hypothyroidism (acquired)    Acute blood loss anemia - requiring transfusion    PAD (peripheral artery disease)    RLS (restless legs syndrome)    ARF (acute renal failure)    Pressure ulcer, hip, right, unstageable    Infected wound    Abnormal LFTs    Advanced age    Acute urinary retention    Pneumonia due to human metapneumovirus    Fecal impaction    Moderate malnutrition     Past Medical History:   Diagnosis Date    Anemia 2016    Benign essential hypertension 2016    Chronic constipation 2016    Degeneration of lumbar intervertebral disc 2016    Diverticulitis of colon 2016    Gastroesophageal reflux disease with esophagitis 2016    Hypercholesteremia 2016    Osteoarthritis 2016    Paroxysmal atrial fibrillation 2016     Past Surgical History:   Procedure Laterality Date    APPENDECTOMY      BACK SURGERY      C-SPINE, LUMBAR SPINE    BLADDER SURGERY      CHOLECYSTECTOMY      ENDOSCOPY N/A 2023    Distal  Esophagitis LA Grade A otherwise normal exam    ENDOSCOPY  09/29/2015    Non-bleeding erosive gastropathy    HYSTERECTOMY      JOINT REPLACEMENT Right     HIP    KNEE SURGERY Right     TOTAL HIP ARTHROPLASTY Left     WOUND DEBRIDEMENT Right 10/17/2024    Procedure: DEBRIDEMENT OF RIGHT HIP WOUND, WOUND VAC PLACEMENT, FECAL DISIMPACTION;  Surgeon: Wade Alegria MD;  Location: [facility];  Service: General;  Laterality: Right;     PT Assessment (Last 12 Hours)       PT Evaluation and Treatment       Row Name 10/23/24 0818          Physical Therapy Time and Intention    Subjective Information complains of;weakness  -WK     Document Type therapy note (daily note);wound care  -WK     Mode of Treatment physical therapy  -WK       Row Name 10/23/24 0818          General Information    Existing Precautions/Restrictions fall  WV R hip  -WK       Row Name 10/23/24 0818          Pain    Additional Documentation --  mild facial grimace with wound vac change  -WK       Row Name 10/23/24 0818          Pain Scale: FACES Pre/Post-Treatment    Pain: FACES Scale, Pretreatment 0-->no hurt  -WK     Posttreatment Pain Rating 0-->no hurt  -WK       Row Name 10/23/24 0818          Bed Mobility    Supine-Sit [place] (Bed Mobility) verbal cues;maximum assist (25% patient effort)  -WK     Sit-Supine [place] (Bed Mobility) verbal cues;maximum assist (25% patient effort)  -WK       Row Name 10/23/24 0818          Balance    Static Sitting Balance verbal cues;standby assist;moderate assist  -WK     Dynamic Sitting Balance verbal cues;moderate assist  -WK     Static Standing Balance --  unable to attempt due to poor sitting balance.  -WK       Row Name 10/23/24 0818          Motor Skills    Comments, Therapeutic Exercise AAROM-PROM with mostly PROM  -WK     Additional Documentation Comments, Therapeutic Exercise (Row)  -WK       Row Name             Wound 10/16/24 9586 Bilateral gluteal    Wound - Properties Group Placement Date:  10/16/24  -SC Placement Time: 2147 -SC Side: Bilateral  -SC Location: gluteal  -SC Present on Original Admission: Y  -SC    Retired Wound - Properties Group Placement Date: 10/16/24  -SC Placement Time: 2147 -SC Present on Original Admission: Y  -SC Side: Bilateral  -SC Location: gluteal  -SC    Retired Wound - Properties Group Placement Date: 10/16/24  -SC Placement Time: 2147 -SC Present on Original Admission: Y  -SC Side: Bilateral  -SC Location: gluteal  -SC    Retired Wound - Properties Group Date first assessed: 10/16/24  -SC Time first assessed: 2147 -SC Present on Original Admission: Y  -SC Side: Bilateral  -SC Location: gluteal  -SC      Row Name             Wound 10/16/24 2000 Right hip    Wound - Properties Group Placement Date: 10/16/24  -SC Placement Time: 2000 -SC Side: Right  -SC Location: hip  -SC    Retired Wound - Properties Group Placement Date: 10/16/24  -SC Placement Time: 2000  -SC Side: Right  -SC Location: hip  -SC    Retired Wound - Properties Group Placement Date: 10/16/24  -SC Placement Time: 2000 -SC Side: Right  -SC Location: hip  -SC    Retired Wound - Properties Group Date first assessed: 10/16/24  -SC Time first assessed: 2000  -SC Side: Right  -SC Location: hip  -SC      Row Name 10/23/24 0818          Wound 10/17/24 1038 Right anterior greater trochanter surgical    Wound - Properties Group Placement Date: 10/17/24  -SB Placement Time: 1038  -SB Side: Right  -SB Orientation: anterior  -SB Location: greater trochanter  -SB Primary Wound Type: Incision  -SB Type: surgical  -SB Present on Original Admission: Y  -SB    Pressure Injury Stage 4  -WK     Dressing Appearance dry;intact  -WK     Base slough;red;pink  -WK     Red (%), Wound Tissue Color --  -WK     Yellow (%), Wound Tissue Color --  -WK     Periwound intact  -WK     Periwound Temperature warm  -WK     Periwound Skin Turgor soft  -WK     Drainage Characteristics/Odor serosanguineous  -WK     Drainage Amount small  -WK      Care, Wound negative pressure wound therapy  -WK     Dressing Care dressing changed  -WK     Periwound Care barrier film applied  -WK     Wound Output (mL) --  total 250  -WK     Retired Wound - Properties Group Placement Date: 10/17/24  -SB Placement Time: 1038  -SB Present on Original Admission: Y  -SB Side: Right  -SB Orientation: anterior  -SB Location: greater trochanter  -SB Primary Wound Type: Incision  -SB Type: surgical  -SB    Retired Wound - Properties Group Placement Date: 10/17/24  -SB Placement Time: 1038  -SB Present on Original Admission: Y  -SB Side: Right  -SB Orientation: anterior  -SB Location: greater trochanter  -SB Primary Wound Type: Incision  -SB Type: surgical  -SB    Retired Wound - Properties Group Date first assessed: 10/17/24  -SB Time first assessed: 1038  -SB Present on Original Admission: Y  -SB Side: Right  -SB Location: greater trochanter  -SB Primary Wound Type: Incision  -SB Type: surgical  -SB      Row Name             NPWT (Negative Pressure Wound Therapy) 10/17/24 right trocar    NPWT (Negative Pressure Wound Therapy) - Properties Group Placement Date: 10/17/24  - Location: right trocar  -SH Additional Comments: presetn upon arrival to PACU  -    Retired NPWT (Negative Pressure Wound Therapy) - Properties Group Placement Date: 10/17/24  - Location: right trocar  -SH Additional Comments: presetn upon arrival to PACU  -    Retired NPWT (Negative Pressure Wound Therapy) - Properties Group Placement Date: 10/17/24  - Location: right trocar  -SH Additional Comments: presetn upon arrival to PACU  -    Retired NPWT (Negative Pressure Wound Therapy) - Properties Group Placement Date: 10/17/24  - Location: right trocar  -SH Additional Comments: presetn upon arrival to PACU  -      Row Name 10/23/24 0818          Plan of Care Review    Plan of Care Reviewed With patient  -WK     Progress improving  -WK     Outcome Evaluation Pt performed bed mobility max A and sitting  balance SBA-mod A with mostly mod A. Pt has limited motivation to improve at this time. Pt was AAROM-PROM for BLE with mostly PROM. Wound vac changed  with good seal obtained. Pt's daughter in law in room and instructed on home safety with wound and wound vac. Nsg stated they would change pt to home vac if she discharges today.  -WK       Row Name 10/23/24 0818          Positioning and Restraints    Pre-Treatment Position in bed  -WK     Post Treatment Position bed  -WK     In Bed side lying left;call light within reach;encouraged to call for assist;with family/caregiver;with other staff  -WK               User Key  (r) = Recorded By, (t) = Taken By, (c) = Cosigned By      Initials Name Provider Type    SH Shea Back, RN Registered Nurse    WK Elis Brannon PTA Physical Therapist Assistant    Jacquelyn Whitaker, RN Registered Nurse    Rosemarie Shepherd, RN Registered Nurse                    Physical Therapy Education       Title: PT OT SLP Therapies (In Progress)       Topic: Physical Therapy (In Progress)       Point: Mobility training (Done)       Learning Progress Summary            Patient Acceptance, E, VU by AE at 10/22/2024 1200    Comment: t/f's,prevention of new pressure areas with off loading and turns every 2 hours.    Acceptance, E, NR by SB at 10/18/2024 1508    Comment: edu on wound vac, alarm system, pressure relief    Acceptance, E,D, VU,DU,NR by BL at 10/18/2024 1020    Comment: Educated on body mechanics to perform functional mobility more easily. Educated on the importance of repositioning to prevent pressure injuries.   Family Acceptance, E, VU by AE at 10/22/2024 1200    Comment: t/f's,prevention of new pressure areas with off loading and turns every 2 hours.    Acceptance, E,D, VU,DU,NR by BL at 10/18/2024 1020    Comment: Educated on body mechanics to perform functional mobility more easily. Educated on the importance of repositioning to prevent pressure injuries.                       Point: Body mechanics (In Progress)       Learning Progress Summary            Patient Acceptance, E, NR by SB at 10/18/2024 1508    Comment: edu on wound vac, alarm system, pressure relief    Acceptance, E,D, VU,DU,NR by BL at 10/18/2024 1020    Comment: Educated on body mechanics to perform functional mobility more easily. Educated on the importance of repositioning to prevent pressure injuries.   Family Acceptance, E,D, VU,DU,NR by BL at 10/18/2024 1020    Comment: Educated on body mechanics to perform functional mobility more easily. Educated on the importance of repositioning to prevent pressure injuries.                      Point: Precautions (In Progress)       Learning Progress Summary            Patient Acceptance, E, NR by SB at 10/18/2024 1508    Comment: edu on wound vac, alarm system, pressure relief    Acceptance, E,D, VU,DU,NR by BL at 10/18/2024 1020    Comment: Educated on body mechanics to perform functional mobility more easily. Educated on the importance of repositioning to prevent pressure injuries.   Family Acceptance, E,D, VU,DU,NR by BL at 10/18/2024 1020    Comment: Educated on body mechanics to perform functional mobility more easily. Educated on the importance of repositioning to prevent pressure injuries.                                      User Key       Initials Effective Dates Name Provider Type Discipline    AE 02/03/23 -  Mable Tiwari, PTA Physical Therapist Assistant PT    SB 07/11/23 -  Tita Diggs, [place] DPT Physical Therapist PT    BL 08/21/24 -  Jonel Ann, [place] Student PT Student PT                  PT Recommendation and Plan     Plan of Care Reviewed With: patient  Progress: improving  Outcome Evaluation: Pt performed bed mobility max A and sitting balance SBA-mod A with mostly mod A. Pt has limited motivation to improve at this time. Pt was AAROM-PROM for BLE with mostly PROM. Wound vac changed  with good seal obtained. Pt's daughter in law in room and instructed on  home safety with wound and wound vac. Nsg stated they would change pt to home vac if she discharges today.   Outcome Measures       Row Name 10/23/24 0818             How much help from another person do you currently need...    Turning from your back to your side while in flat bed without using bedrails? 2  -WK      Moving from lying on back to sitting on the side of a flat bed without bedrails? 2  -WK      Moving to and from a bed to a chair (including a wheelchair)? 1  -WK      Standing up from a chair using your arms (e.g., wheelchair, bedside chair)? 1  -WK      Climbing 3-5 steps with a railing? 1  -WK      To walk in hospital room? 1  -WK      AM-PAC 6 Clicks Score (PT) 8  -WK         Functional Assessment    Outcome Measure Options AM-PAC 6 Clicks Basic Mobility (PT)  -WK                User Key  (r) = Recorded By, (t) = Taken By, (c) = Cosigned By      Initials Name Provider Type    WK Elis Brannon PTA Physical Therapist Assistant                     Time Calculation:    PT Charges       Row Name 10/23/24 0929             Time Calculation    Start Time 0818  -WK      Stop Time 0928  -WK      Time Calculation (min) 70 min  -WK      PT Received On 10/23/24  -WK         Time Calculation- PT    Total Timed Code Minutes- PT 70 minute(s)  -WK         Timed Charges    93201 - PT Therapeutic Activity Minutes 45  25 minute wound vac  -WK         Total Minutes    Timed Charges Total Minutes 45  -WK       Total Minutes 45  -WK                User Key  (r) = Recorded By, (t) = Taken By, (c) = Cosigned By      Initials Name Provider Type    WK Elis Brannon PTA Physical Therapist Assistant                  Therapy Charges for Today       Code Description Service Date Service Provider Modifiers Qty    22502720182 HC PT THERAPEUTIC ACT EA 15 MIN 10/23/2024 Elis Brannon PTA GP 3    90105317072 HC PT NEG PRESS WOUND TO 50SQCM DME2 10/23/2024 Elis Brannon PTA GP 1            PT G-Codes  Outcome Measure  Options: AM-PAC 6 Clicks Basic Mobility (PT)  AM-PAC 6 Clicks Score (PT): 8    Elis Brannon, PTA  10/23/2024

## 2024-10-23 NOTE — CASE MANAGEMENT/SOCIAL WORK
Continued Stay Note   [facility]     Patient Name: Sofía Clifton  MRN: [id number]  Today's Date: 10/23/2024    Admit Date: 10/16/2024    Plan: Home Health   Discharge Plan       Row Name 10/23/24 1035       Plan    Plan Home Health    Patient/Family in Agreement with Plan yes    Final Discharge Disposition Code 06 - home with home health care    Final Note Pt's hospital bed and mattress will be delivered this am to pt's home. Wound vac approved and has been delivered to pt's room. POD signed and being faxed to [facility] at [phone]. Notified Denisha with [employer]  [phone] of d/c today.                   Discharge Codes    No documentation.                 Expected Discharge Date and Time       Expected Discharge Date Expected Discharge Time    Oct 22, 2024               PABLO Trevino

## 2024-10-23 NOTE — OUTREACH NOTE
Prep Survey      Flowsheet Row Responses   [facility] facility patient discharged from? [place]   Is LACE score < 7 ? No   Eligibility Readm Mgmt   Discharge diagnosis DEBRIDEMENT OF RIGHT HIP WOUND, WOUND VAC PLACEMENT, FECAL DISIMPACTION   Does the patient have one of the following disease processes/diagnoses(primary or secondary)? General Surgery   Does the patient have Home health ordered? Yes   What is the Home health agency?   [facility]   Prep survey completed? Yes            Salud GARCÍA - Registered Nurse

## 2024-10-24 NOTE — THERAPY DISCHARGE NOTE
Acute Care - Physical Therapy Discharge Summary  [facility]       Patient Name: Sofía Clifton  : 1935  MRN: [id number]    Today's Date: 10/24/2024  Onset of Illness/Injury or Date of Surgery: 10/17/24       Referring Physician: Dr. Alegria      Admit Date: 10/16/2024      PT Recommendation and Plan    Visit Dx:    ICD-10-CM ICD-9-CM   1. Acute renal failure, unspecified acute renal failure type  N17.9 584.9   2. Cellulitis of hip  L03.119 682.6   3. Pneumonia due to human metapneumovirus (hMPV)  J12.3 480.8   4. Pressure ulcer, hip, right, unstageable  L89.210 707.04     707.25   5. Impaired mobility [Z74.09]  Z74.09 799.89   6. Open wound [T14.8XXA]  T14.8XXA 879.8   7. Advanced age  R54 797        Outcome Measures       Row Name 10/23/24 0818             How much help from another person do you currently need...    Turning from your back to your side while in flat bed without using bedrails? 2  -WK      Moving from lying on back to sitting on the side of a flat bed without bedrails? 2  -WK      Moving to and from a bed to a chair (including a wheelchair)? 1  -WK      Standing up from a chair using your arms (e.g., wheelchair, bedside chair)? 1  -WK      Climbing 3-5 steps with a railing? 1  -WK      To walk in hospital room? 1  -WK      AM-PAC 6 Clicks Score (PT) 8  -WK         Functional Assessment    Outcome Measure Options AM-PAC 6 Clicks Basic Mobility (PT)  -WK                User Key  (r) = Recorded By, (t) = Taken By, (c) = Cosigned By      Initials Name Provider Type    Elis Damon PTA Physical Therapist Assistant                         PT Rehab Goals       Row Name 10/24/24 1531             Bed Mobility Goal 1 (PT)    Activity/Assistive Device (Bed Mobility Goal 1, PT) rolling to left;rolling to right;scooting;sit to supine;supine to sit  -NW      [place] Level/Cues Needed (Bed Mobility Goal 1, PT) contact guard required  -NW      Time Frame (Bed Mobility Goal 1, PT) long term goal  (LTG)  -NW      Progress/Outcomes (Bed Mobility Goal 1, PT) goal not met  -NW         Transfer Goal 1 (PT)    Activity/Assistive Device (Transfer Goal 1, PT) sit-to-stand/stand-to-sit;bed-to-chair/chair-to-bed  -NW      [place] Level/Cues Needed (Transfer Goal 1, PT) contact guard required  -NW      Time Frame (Transfer Goal 1, PT) long term goal (LTG)  -NW      Progress/Outcome (Transfer Goal 1, PT) goal not met  -NW                User Key  (r) = Recorded By, (t) = Taken By, (c) = Cosigned By      Initials Name Provider Type Discipline    Rayne Haywood PTA Physical Therapist Assistant PT                        PT Discharge Summary  Anticipated Discharge Disposition (PT): home with home health  Reason for Discharge: Discharge from facility  Outcomes Achieved: Refer to plan of care for updates on goals achieved  Discharge Destination: Home with home health      Rayne Daley PTA   10/24/2024

## 2024-10-24 NOTE — PAYOR COMM NOTE
"[facility] 10-23-24   [phone]    Sofía Clifton (89 y.o. Female)       Date of Birth   1935    Social Security Number       Address   [address] [place] [address]    Home Phone   [phone]    MRN   [id number]       Jewish   Confucianism    Marital Status                               Admission Date   10/16/24    Admission Type   Emergency    Admitting Provider   Humberto Guillen MD    Attending Provider       Department, Room/Bed   [facility] 3C, 391/1       Discharge Date   10/23/2024    Discharge Disposition   [facility]    Discharge Destination                                 Attending Provider: (none)   Allergies: Codeine, Morphine And Codeine, Norflex [Orphenadrine Citrate], Stadol [Butorphanol]    Isolation: None   Infection: Human Metapneumovirus  (10/16/24)   Code Status: Prior    Ht: 160 cm (62.99\")   Wt: 48.7 kg (107 lb 5.8 oz)    Admission Cmt: None   Principal Problem: ARF (acute renal failure) [N17.9]                   Active Insurance as of 10/16/2024       Primary Coverage       Payor Plan Insurance Group Employer/Plan Group    ANTHEM MEDICARE REPLACEMENT ANTHEM MEDICARE ADVANTAGE [id number]       Payor Plan Address Payor Plan Phone Number Payor Plan Fax Number Effective Dates    [address] [phone]  1/1/2016 - None Entered    [facility] [id number]         Subscriber Name Subscriber Birth Date Member SOFÍA SIMS 1935 [id number]               Secondary Coverage       Payor Plan Insurance Group Employer/Plan Group     FOR LIFE  FOR LIFE MC SUP         Payor Plan Address Payor Plan Phone Number Payor Plan Fax Number Effective Dates    [address] [phone]  11/22/2016 - None Entered    [facility] [id number]         Subscriber Name Subscriber Birth Date Member SOFÍA SIMS 1935 [id number]                     Emergency Contacts        (Rel.) Home Phone Work Phone Mobile " Phone    Edwar Clifton (Son) -- -- [phone]    Meeta Haile (Daughter) [phone] -- [phone]                 Discharge Summary        Humberto Guillen MD at 10/23/24 120                [facility]  DISCHARGE SUMMARY       Date of Admission: 10/16/2024  Date of Discharge:  10/23/2024  Primary Care Physician: Sukhi Pelayo MD    Presenting Problem/History of Present Illness:  Right hip wound  Final Discharge Diagnoses:  Active Hospital Problems    Diagnosis     **ARF (acute renal failure)     Moderate malnutrition     Fecal impaction     Pressure ulcer, hip, right, unstageable     Infected wound     Abnormal LFTs     Advanced age     Acute urinary retention     Pneumonia due to human metapneumovirus     Chronic constipation     Anemia        Consults:     General surgery     Procedures Performed:   10/17/2024     Pre-op Diagnosis:   Pressure ulcer, hip, right, unstageable [L89.210]     Post-op Diagnosis:     Post-Op Diagnosis Codes:     * Pressure ulcer, hip, right, unstageable [L89.210]     Procedure/CPT® Codes:        Procedure(s):  DEBRIDEMENT OF RIGHT HIP WOUND, WOUND VAC PLACEMENT, FECAL DISIMPACTION     Surgeon(s):  Wade Alegria MD    Pertinent Test Results:   Results for orders placed during the hospital encounter of 08/15/17    Adult Transthoracic Echo Complete With Contrast    Interpretation Summary  · Left ventricular systolic function is normal.  · Left ventricular diastolic dysfunction (grade I) consistent with impaired relaxation.  · No significant valvular abnormalities are identified.      Imaging Results (All)       Procedure Component Value Units Date/Time    CT Abdomen Pelvis Without Contrast [[id number]] Collected: 10/16/24 1713     Updated: 10/16/24 1723    Narrative:      EXAMINATION:  CT ABDOMEN PELVIS WO CONTRAST-  10/16/2024 3:56 PM     HISTORY: Right hip wound.     TECHNIQUE: Spiral CT was performed of the abdomen and  pelvis without  contrast. Multiplanar images were reconstructed.     DLP: 175.78 mGy.cm Automated dosage reduction technique was utilized to  reduce patient dosage.     COMPARISON: 9/28/2015.     LUNG BASES: There is atelectasis in both lung bases. There is patchy  consolidation in the lingula. There is coronary artery calcification.  There is aortic and mitral valve calcification.     LIVER AND SPLEEN: The liver is very dense with a Hounsfield unit  measurement of 116. The spleen is normal density with multiple calcified  granulomas. There has been prior cholecystectomy.     PANCREAS: Normal unenhanced appearance of the pancreas.     KIDNEYS AND ADRENALS: The adrenal glands and right kidney are  unremarkable. There is a 3.5 cm left renal cyst with a Hounsfield  measurement of 11. The renal collecting systems are nondilated. There is  mild distention of the urinary bladder. There is air in the bladder.     BOWEL: No oral contrast was given. There is under distention of the  stomach. There is gastric wall thickening. Small bowel loops are  nondilated. There is moderate to large stool in the colon. There is  diverticulosis of the colon. There is fecal impaction of the rectum  measuring 8.9 cm transversely.     OTHER: There is atheromatous disease of the aortoiliac vessels. There  has been prior bilateral hip arthroplasty. There is narrowing and  spurring of the symphysis pubis. There are degenerative changes of the  spine. There is lumbar scoliosis convex to the left. There is skin  ulceration lateral to the right ilium. There is some air within the soft  tissues in this area. There is a phlegmon measuring about 5.6 x 3.1 cm.  Simple fluid is not seen in this area. This extends to the lateral  margin of the ilium. I do not see any iliac erosive change or  destruction to suggest osteomyelitis involving the right ilium.          Impression:      1. Skin ulceration in the right pelvic region lateral to the ileum with  a  5.6 x 3.1 cm collection that is not simple fluid. This is likely an  inflammatory mass/phlegmon. There is some air in the soft tissues in  this area. This abuts the lateral margin of the right ilium. There is no  right ilium erosion or destruction to suggest osteomyelitis.  2. Atelectasis in the lung bases. Patchy lingular consolidation may  represent atelectasis or pneumonia. Coronary artery calcification.  Aortic and mitral valve calcification.  3. The liver is very dense. Hemachromatosis is considered. Increased  liver density may also be seen with chronic amiodarone therapy.  4. A 3.5 cm left renal cyst.  5. Mild distention of the urinary bladder. There is air in the urinary  bladder. Correlate with instrumentation.  6. Gastric wall thickening likely due to under distention. Gastritis and  other etiologies are also considered.  7. Diverticulosis of the colon. Fecal impaction of the rectum measuring  8.9 cm transversely.  8. Other nonacute findings, as discussed above.        The full report of this exam was immediately signed and available to the  emergency room. The patient is currently in the emergency room.     This report was signed and finalized on 10/16/2024 5:20 PM by Dr. Yunior Chanel MD.       XR Chest 1 View [[id number]] Collected: 10/16/24 1508     Updated: 10/16/24 1512    Narrative:      EXAM: XR CHEST 1 VW-      DATE: 10/16/2024 2:03 PM     HISTORY: Cough and congestion       COMPARISON: 8/15/2017.     TECHNIQUE:  Frontal view(s) of the chest submitted.     FINDINGS:    There are bilateral opacities which may be related to edema or  multifocal pneumonia. There is elevation of the right hemidiaphragm. No  effusion or pneumothorax is seen. There is calcification of the thoracic  aorta. There is bilateral glenohumeral joint osteoarthritis and AC joint  arthropathy.          Impression:         1. Bilateral opacities in the lungs may be due to edema or multifocal  pneumonia.     This report was signed  and finalized on 10/16/2024 3:09 PM by Jose Hooper.             LAB RESULTS:      Lab 10/19/24  0301 10/18/24  0517 10/17/24  0438 10/16/24  1549   WBC  --  10.39 11.32* 11.89*   HEMOGLOBIN 8.1* 8.0* 8.6* 8.6*   HEMATOCRIT 25.4* 25.2* 26.6* 26.4*   PLATELETS  --  205 195 198   NEUTROS ABS  --   --  10.06* 11.17*   EOS ABS  --   --  0.00 0.00   MCV  --  103.7* 105.1* 103.5*   LACTATE  --   --   --  1.6   PROTIME  --   --  14.0  --          Lab 10/23/24  0927 10/22/24  1504 10/21/24  0617 10/20/24  0354 10/19/24  0301 10/18/24  0517   SODIUM 129*  --  130* 132* 134* 138   POTASSIUM 4.1  --  3.7 3.7 3.1* 3.5   CHLORIDE 99  --  101 103 100 104   CO2 16.0*  --  20.0* 21.0* 20.0* 22.0   ANION GAP 14.0  --  9.0 8.0 14.0 12.0   BUN 13  --  14 18 30* 35*   CREATININE 0.86  --  0.97 1.17* 1.44* 1.56*   EGFR 65.5  --  56.7* 45.3* 35.3* 32.0*   GLUCOSE 103*  --  88 90 103* 108*   CALCIUM 8.0*  --  8.2* 8.4* 8.3* 8.9   MAGNESIUM  --   --   --  1.9  --   --    TSH  --  6.670*  --   --   --   --          Lab 10/21/24  0617 10/20/24  0354 10/19/24  0301 10/18/24  0517 10/17/24  0438   TOTAL PROTEIN 6.2 6.3 5.9* 6.4 6.5   ALBUMIN 3.1* 3.1* 2.8* 3.3* 3.1*   GLOBULIN 3.1 3.2 3.1 3.1 3.4   ALT (SGPT) 196* 245* 272* 339* 445*   AST (SGOT) 80* 102* 115* 172* 243*   BILIRUBIN 0.5 0.5 0.3 0.6 0.6   ALK PHOS 117 130* 143* 131* 149*         Lab 10/17/24  0438   PROTIME 14.0   INR 1.04             Lab 10/17/24  0438 10/16/24  1549   FERRITIN  --  2,435.00*   FOLATE >20.00  --    VITAMIN B 12 >2,000*  --          Brief Urine Lab Results  (Last result in the past 365 days)        Color   Clarity   Blood   Leuk Est   Nitrite   Protein   CREAT   Urine HCG        10/17/24 0606 Yellow   Clear   Negative   Negative   Negative   Trace                 Microbiology Results (last 10 days)       Procedure Component Value - Date/Time    Anaerobic Culture - Surgical Site, Hip, Right [[id number]]  (Abnormal) Collected: 10/17/24 1045    Lab Status:  Final result Specimen: Surgical Site from Hip, Right Updated: 10/22/24 1133     Anaerobic Culture Finegoldia magna    Wound Culture - Surgical Site, Hip, Right [[id number]]  (Abnormal) Collected: 10/17/24 1045    Lab Status: Final result Specimen: Surgical Site from Hip, Right Updated: 10/18/24 0902     Wound Culture Scant growth (1+) Enterococcus faecalis     Gram Stain Moderate (3+) WBCs seen      Few (2+) Gram positive cocci    Narrative:      Refer to previous wound culture collected on 10/16/2024 1655 for MICs.    MRSA Screen, PCR (Inpatient) - Swab, Nares [[id number]]  (Normal) Collected: 10/17/24 0443    Lab Status: Final result Specimen: Swab from Nares Updated: 10/17/24 0611     MRSA PCR No MRSA Detected    Narrative:      The negative predictive value of this diagnostic test is high and should only be used to consider de-escalating anti-MRSA therapy. A positive result may indicate colonization with MRSA and must be correlated clinically.    Blood Culture - Blood, Arm, Left [[id number]]  (Normal) Collected: 10/16/24 1612    Lab Status: Final result Specimen: Blood from Arm, Left Updated: 10/21/24 1700     Blood Culture No growth at 5 days    Wound Culture - Wound, Hip, Right [[id number]]  (Abnormal)  (Susceptibility) Collected: 10/16/24 1555    Lab Status: Final result Specimen: Wound from Hip, Right Updated: 10/18/24 0815     Wound Culture Light growth (2+) Enterococcus faecalis     Gram Stain Many (4+) Gram positive cocci      Many (4+) Gram negative bacilli      Many (4+) WBCs seen    Susceptibility        Enterococcus faecalis      RENE      Ampicillin Susceptible      Vancomycin Susceptible                           Respiratory Panel PCR w/COVID-19(SARS-CoV-2) EUNICE/CLARIBEL/TANNER/PAD/COR/JEROME In-House, NP Swab in UTM/VTM, 2 HR TAT - Swab, Nasopharynx [[id number]]  (Abnormal) Collected: 10/16/24 1553    Lab Status: Final result Specimen: Swab from Nasopharynx Updated: 10/16/24 1710     ADENOVIRUS, PCR Not Detected      Coronavirus 229E Not Detected     Coronavirus HKU1 Not Detected     Coronavirus NL63 Not Detected     Coronavirus OC43 Not Detected     COVID19 Not Detected     Human Metapneumovirus Detected     Human Rhinovirus/Enterovirus Not Detected     Influenza A PCR Not Detected     Influenza B PCR Not Detected     Parainfluenza Virus 1 Not Detected     Parainfluenza Virus 2 Not Detected     Parainfluenza Virus 3 Not Detected     Parainfluenza Virus 4 Not Detected     RSV, PCR Not Detected     Bordetella pertussis pcr Not Detected     Bordetella parapertussis PCR Not Detected     Chlamydophila pneumoniae PCR Not Detected     Mycoplasma pneumo by PCR Not Detected    Narrative:      In the setting of a positive respiratory panel with a viral infection PLUS a negative procalcitonin without other underlying concern for bacterial infection, consider observing off antibiotics or discontinuation of antibiotics and continue supportive care. If the respiratory panel is positive for atypical bacterial infection (Bordetella pertussis, Chlamydophila pneumoniae, or Mycoplasma pneumoniae), consider antibiotic de-escalation to target atypical bacterial infection.    Blood Culture - Blood, Arm, Right [[id number]]  (Normal) Collected: 10/16/24 1546    Lab Status: Final result Specimen: Blood from Arm, Right Updated: 10/21/24 1616     Blood Culture No growth at 5 days            Hospital Course:   I received information that the hospital bed has been delivered.  Patient also has wound VAC.  Durable medical equipment necessary has been in place.  Patient has been referred to home health.  Daughter-in-law at bedside made aware of the laboratory tests, medications and plan of care.  She verbalized adequate understanding and agreeable to current plan of care.  Laboratory today showed sodium of 129 with bicarb of 16.  I added sodium bicarbonate to her regimen and recommend follow-up basic metabolic panel in outpatient setting.  This can be done  either through home health or through primary care provider in about a week.    Otherwise she is asymptomatic from this abnormality.  I encouraged her to increase p.o. intake and started her on Megace on October 22.    In summary, patient was admitted for right hip wound and underwent  Surgical debridement and placement of wound VAC on right hip wound along with this she had fecal disimpaction.    The therapist was at bedside today.  She recently replaced the wound VAC seal.    Patient received antibiotic for wound culture showing Enterococcus faecalis.    She also was diagnosed with human metapneumovirus infection.  She was treated supportively and did not require any oxygen supplementation    She had elevated transaminase which is trending down.  Unclear of its etiology.  There was question of mild rhabdo given degree of tissue/wound infection on arrival.      She has chronic anemia.  She has elevated ferritin to suggest under utilization.  Her CT of the abdomen and pelvis indicates that the liver is very dense which hemochromatosis is considered.  Increased liver density may also be seen with chronic amiodarone therapy (apparently noted to be present in her list of medication prior to this admission that was not continued while in the hospital)    Her home medication noted to include amiodarone although she did not have any EKG on admit.  Record from July 8, 2023 showed normal sinus rhythm record from August 15, 2017 indicates normal sinus rhythm also.  From further reviewing her record she has paroxysmal atrial fibrillation.    I did not see any record of anticoagulant prescription in the last 365 days.  Her home medication initially held Plavix (history of coronary artery disease with CABG).  Her hemoglobin though has been stable at 8.6, 8 and 8.1 the past few days.  Patient has not had any bleeding.    I strongly encouraged them to follow closely with primary care provider to make sure medications remains  "appropriate for her while in the outpatient setting.    She had hypokalemia which is now resolved.    She has hypothyroidism which can also be a cause of hyponatremia.  I increased her levothyroxine.      Note that at some point during this hospitalization her creatinine was 1.72 and trended down possibly with fluid.  Her creatinine prior to discharge is 0.86.    Overall she remains hemodynamically stable and believed to be medically appropriate for discharge.  I answered all questions of daughter-in-law at bedside to the best of my abilities.  She verbalized adequate understanding.    Physical Exam on Discharge:  /48 (BP Location: Right arm, Patient Position: Lying)   Pulse 78   Temp 98.2 °F (36.8 °C)   Resp 16   Ht 160 cm (62.99\")   Wt 48.7 kg (107 lb 5.8 oz)   SpO2 100%   BMI 19.02 kg/m²   Physical Exam  Nontoxic-appearing  Has a pure wick  Noted presence of wound VAC right sides probably area of right iliac bone     GEN: Awake, alert, interactive, in NAD  HEENT:  PERRLA, EOMI, Anicteric, Trachea midline  Lungs: no wheezing/rales/rhonchi  Heart: RRR, +S1/s2, no rub  ABD: soft, nt/nd, +BS, no guarding/rebound  Extremities: no cyanosis, no pitting edema  Skin: wound vac in place to R hip/iliac crest  Neuro:  no obvious focal deficits   Appropriate affect  No significant change from yesterday's exam       Condition on Discharge:   stable    Discharge Disposition:  Home-Health Care [facility]    Discharge Medications:     Discharge Medications        New Medications        Instructions Start Date   acetaminophen 325 MG tablet  Commonly known as: TYLENOL   650 mg, Oral, Every 6 Hours PRN      amoxicillin 500 MG capsule  Commonly known as: AMOXIL   500 mg, Oral, Every 8 Hours Scheduled      megestrol 40 MG tablet  Commonly known as: MEGACE   40 mg, Oral, Daily   Start Date: October 24, 2024     sodium bicarbonate 650 MG tablet   650 mg, Oral, 2 Times Daily             Changes to Medications        Instructions " Start Date   levothyroxine 100 MCG tablet  Commonly known as: SYNTHROID, LEVOTHROID  What changed:   medication strength  how much to take   100 mcg, Oral, Every Early Morning   Start Date: October 24, 2024            Continue These Medications        Instructions Start Date   amiodarone 200 MG tablet  Commonly known as: PACERONE   1 tablet, 2 Times Daily      CeleXA 20 MG tablet  Generic drug: citalopram   20 mg, Oral, Daily      clopidogrel 75 MG tablet  Commonly known as: PLAVIX   75 mg, Oral, Daily      metoprolol tartrate 25 MG tablet  Commonly known as: LOPRESSOR   25 mg, 2 Times Daily      ondansetron ODT 4 MG disintegrating tablet  Commonly known as: ZOFRAN-ODT   4 mg, Translingual, Every 8 Hours PRN      pantoprazole 40 MG EC tablet  Commonly known as: PROTONIX   40 mg, Oral, Daily      pravastatin 40 MG tablet  Commonly known as: PRAVACHOL   40 mg, Daily      rOPINIRole 0.25 MG tablet  Commonly known as: REQUIP   0.25 mg, Nightly             Stop These Medications      CALCIUM 600/VITAMIN D3 PO     cetirizine 10 MG tablet  Commonly known as: zyrTEC     furosemide 40 MG tablet  Commonly known as: LASIX     hydrALAZINE 25 MG tablet  Commonly known as: APRESOLINE     HYDROcodone-acetaminophen  MG per tablet  Commonly known as: NORCO     isosorbide mononitrate 30 MG 24 hr tablet  Commonly known as: IMDUR     potassium chloride 10 MEQ CR tablet     spironolactone 25 MG tablet  Commonly known as: ALDACTONE              This patient has current or prior documentation of an left ventricular ejection fraction (LVEF) of less than or equal to 40%. - no information to based it upon.           Discharge Diet:   Diet Instructions       Diet: Regular/House Diet; Thin (IDDSI 0)      Discharge Diet: Regular/House Diet    Fluid Consistency: Thin (IDDSI 0)            Activity at Discharge:   Activity Instructions       Gradually Increase Activity Until at Pre-Hospitalization Level              Follow-up Appointments:    Future Appointments   Date Time Provider Department Center   11/26/2024  9:00 AM Wade Alegria MD [facility]       Test Results Pending at Discharge: none    Electronically signed by Humberto Guillen MD, 10/23/24, 12:06 CDT.    Time: > 30  minutes.       I confirmed that the patient's advanced care plan is present, code status is documented, and a surrogate decision maker is listed in the patient's medical record.        Electronically signed by Humberto Guillen MD at 10/23/24 2147

## 2024-10-25 ENCOUNTER — HOME CARE VISIT (OUTPATIENT)
Dept: HOME HEALTH SERVICES | Facility: CLINIC | Age: 89
End: 2024-10-25
Payer: MEDICARE

## 2024-10-25 PROCEDURE — G0299 HHS/HOSPICE OF RN EA 15 MIN: HCPCS

## 2024-10-25 NOTE — Clinical Note
"Please route to Sukhi Pelayo MD in [facility]  SOC. 89-year-old  female admitted to hospital after being evaluated by home health, and workup being concerning for a worsening wound near her right hip that has been slowly getting worse for the past couple of weeks. After she was on antibiotics the outpatient setting for a bladder infection recently,. She has continued to have increase more weakness and fatigued, and even a little bit more confused as compared to her baseline. Patient was the admitted and underwent Surgical debridement and placement of wound VAC on right hip wound along with this she had fecal decompaction. Patient received antibiotic for wound culture showing Enterococcus faecalis.  She also was diagnosed with human metapneumovirus infection.  She was treated supportively and did not require any oxygen supplementation  PMH: dementia, hypertension, chronic constipation   Focus of Care: Pressure injury of skin of right hip, unspecified injury stage  Home Health ordered for: SN, PT, OT  Skills Needed: cardiopulmonary assessment, medication education, fall prevention, pain management, emergency plans, infection prevention, wound care    Skills preformed: cardiopulmonary assessment, medication education, fall prevention, pain management, emergency plans, infection prevention, wound care    Patient's goal(s): \"to get back to activites\"    Current Functional status/mobility/DME: up to wheelchair, pivots with assistance of daughter    HB status/Living Arrangements: lives in home with daughter and family    Skin Integrity/wound status: wound to right hip with wound vac    Code Status: DNR    Fall Risk/Safety concerns: high fall risk.    Educated on Emergency Plan, steps to take prior to going to the ER and when to Call Home [facility]: yes     Medication issues/Concerns: numerous medications, high risk meds    Additional Problems/Concerns: none    SDOH Barriers (i.e. caregiver concerns, " social isolation, transportation, food insecurity, environment, income etc.)/Need for MSW:   None    Plan for next visit: cardiopulmonary assessment, medication education, fall prevention, pain management, infection prevention, disease process and management, wound care

## 2024-10-27 VITALS — DIASTOLIC BLOOD PRESSURE: 58 MMHG | SYSTOLIC BLOOD PRESSURE: 102 MMHG

## 2024-10-28 ENCOUNTER — HOME CARE VISIT (OUTPATIENT)
Dept: HOME HEALTH SERVICES | Facility: CLINIC | Age: 89
End: 2024-10-28
Payer: MEDICARE

## 2024-10-28 VITALS
HEART RATE: 63 BPM | OXYGEN SATURATION: 92 % | SYSTOLIC BLOOD PRESSURE: 118 MMHG | DIASTOLIC BLOOD PRESSURE: 55 MMHG | TEMPERATURE: 98.2 F | RESPIRATION RATE: 18 BRPM

## 2024-10-28 PROCEDURE — G0152 HHCP-SERV OF OT,EA 15 MIN: HCPCS

## 2024-10-28 PROCEDURE — G0299 HHS/HOSPICE OF RN EA 15 MIN: HCPCS

## 2024-10-28 NOTE — HOME HEALTH
"Please route to Sukhi Pelayo MD in [facility]  SOC. 89-year-old  female admitted to hospital after being evaluated by home health, and workup being concerning for a worsening wound near her right hip that has been slowly getting worse for the past couple of weeks. After she was on antibiotics the outpatient setting for a bladder infection recently,. She has continued to have increase more weakness and fatigued, and even a little bit more confused as compared to her baseline. Patient was the admitted and underwent Surgical debridement and placement of wound VAC on right hip wound along with this she had fecal decompaction. Patient received antibiotic for wound culture showing Enterococcus faecalis.  She also was diagnosed with human metapneumovirus infection.  She was treated supportively and did not require any oxygen supplementation  PMH: dementia, hypertension, chronic constipation   Focus of Care: Pressure injury of skin of right hip, unspecified injury stage  Home Health ordered for: SN, PT, OT  Skills Needed: cardiopulmonary assessment, medication education, fall prevention, pain management, emergency plans, infection prevention, wound care    Skills preformed: cardiopulmonary assessment, medication education, fall prevention, pain management, emergency plans, infection prevention, wound care    Patient's goal(s): \"to get back to activites\"    Current Functional status/mobility/DME: up to wheelchair, pivots with assistance of daughter    HB status/Living Arrangements: lives in home with daughter and family    Skin Integrity/wound status: wound to right hip with wound vac    Code Status: DNR    Fall Risk/Safety concerns: high fall risk.    Educated on Emergency Plan, steps to take prior to going to the ER and when to Call [facility]: yes     Medication issues/Concerns: numerous medications, high risk meds    Additional Problems/Concerns: none    SDOH Barriers (i.e. caregiver concerns, " social isolation, transportation, food insecurity, environment, income etc.)/Need for MSW:   None    Plan for next visit: cardiopulmonary assessment, medication education, fall prevention, pain management, infection prevention, disease process and management, wound care

## 2024-10-28 NOTE — HOME HEALTH
SUBJECTIVE: Family reports patient was hospitalized for a week, was not eating and has lost strength and ability to stand or help with transfers.  DX:  Unspecified open wound, left lower leg,  PMH: hx dementia  SURGICAL PROCEDURE: underwent debridement right hip wound, wound vac placement, fecal disimpaction  PRECAUTIONS: fall precautions, wound  OXYGEN USE: no  EQUIPMENT: wound vac, hospital bed, w/c, multiple wedges for positioning, family reports cushion for recliner has been ordered,   PRIOR LEVEL OF FUNCTION: lives with family, was min assist with transfers, took sponge bath, patient did what she could to assist but was mostly dependent with ADLs,   MEDICAL NECESSITY: skilled OT for family education on safety, AE needs, UE HEP  PATIENT GOALS: family would like for patient to be able to help with transfers as she was prior to hospitalization  COMMUNICATION / CARE COORDINATION:  with admitting RN  DATE OF NEXT APPOINTMENT WITH DOCTOR: Dr. Pelayo week after nex, Nov 26 9:00 am  ANTICIPATED DISCHARGE PLAN: to family care  PATIENT/CAREGIVER AGREE WITH DISCHARGE PLAN: yes  SPECIFIC INTERVENTIONS AND GOALS TO ADDRESS ON NEXT VISIT: N/A  FREQUENCY AND DURATION: eval only       PATIENT HAS RECEIVED EDUCATION ON COVID-19, PREVENTION, HANDWASHING, SOCIAL DISTANCING PER CDC  SUBJECTIVE:

## 2024-10-29 ENCOUNTER — HOME CARE VISIT (OUTPATIENT)
Dept: HOME HEALTH SERVICES | Facility: CLINIC | Age: 89
End: 2024-10-29
Payer: MEDICARE

## 2024-10-29 ENCOUNTER — READMISSION MANAGEMENT (OUTPATIENT)
Dept: CALL CENTER | Facility: HOSPITAL | Age: 89
End: 2024-10-29
Payer: MEDICARE

## 2024-10-29 VITALS
RESPIRATION RATE: 16 BRPM | HEART RATE: 64 BPM | SYSTOLIC BLOOD PRESSURE: 110 MMHG | DIASTOLIC BLOOD PRESSURE: 58 MMHG | OXYGEN SATURATION: 93 % | TEMPERATURE: 97.3 F

## 2024-10-29 PROCEDURE — G0151 HHCP-SERV OF PT,EA 15 MIN: HCPCS

## 2024-10-29 NOTE — OUTREACH NOTE
General Surgery Week 1 Survey      Flowsheet Row Responses   [facility] facility patient discharged from? [place]   Does the patient have one of the following disease processes/diagnoses(primary or secondary)? General Surgery   Week 1 attempt successful? No   Unsuccessful attempts Attempt 1            Hattie CUBA - Registered Nurse

## 2024-10-30 ENCOUNTER — HOME CARE VISIT (OUTPATIENT)
Dept: HOME HEALTH SERVICES | Facility: CLINIC | Age: 89
End: 2024-10-30
Payer: MEDICARE

## 2024-10-30 PROCEDURE — G0299 HHS/HOSPICE OF RN EA 15 MIN: HCPCS

## 2024-10-31 VITALS
OXYGEN SATURATION: 94 % | TEMPERATURE: 96.2 F | HEART RATE: 62 BPM | RESPIRATION RATE: 18 BRPM | DIASTOLIC BLOOD PRESSURE: 80 MMHG | SYSTOLIC BLOOD PRESSURE: 122 MMHG

## 2024-11-01 ENCOUNTER — HOME CARE VISIT (OUTPATIENT)
Dept: HOME HEALTH SERVICES | Facility: CLINIC | Age: 89
End: 2024-11-01
Payer: MEDICARE

## 2024-11-01 PROCEDURE — G0299 HHS/HOSPICE OF RN EA 15 MIN: HCPCS

## 2024-11-01 NOTE — CASE COMMUNICATION
Patient missed a Physical Therapy visit from [facility] on 11/1/24.     Reason: No insurance authorization for visit.       For your records only.   Per CMS Guidance, MD must be notified of missed/cancelled visits; therefore the prescribed frequency was not met.

## 2024-11-04 ENCOUNTER — HOME CARE VISIT (OUTPATIENT)
Dept: HOME HEALTH SERVICES | Facility: CLINIC | Age: 89
End: 2024-11-04
Payer: MEDICARE

## 2024-11-04 PROCEDURE — G0299 HHS/HOSPICE OF RN EA 15 MIN: HCPCS

## 2024-11-05 VITALS
HEART RATE: 64 BPM | DIASTOLIC BLOOD PRESSURE: 62 MMHG | SYSTOLIC BLOOD PRESSURE: 118 MMHG | TEMPERATURE: 97.5 F | RESPIRATION RATE: 18 BRPM | OXYGEN SATURATION: 99 %

## 2024-11-05 VITALS
DIASTOLIC BLOOD PRESSURE: 70 MMHG | TEMPERATURE: 98.1 F | HEART RATE: 71 BPM | OXYGEN SATURATION: 97 % | SYSTOLIC BLOOD PRESSURE: 126 MMHG | RESPIRATION RATE: 18 BRPM

## 2024-11-06 ENCOUNTER — HOME CARE VISIT (OUTPATIENT)
Dept: HOME HEALTH SERVICES | Facility: CLINIC | Age: 89
End: 2024-11-06
Payer: MEDICARE

## 2024-11-06 VITALS
RESPIRATION RATE: 18 BRPM | TEMPERATURE: 98.3 F | SYSTOLIC BLOOD PRESSURE: 100 MMHG | HEART RATE: 81 BPM | DIASTOLIC BLOOD PRESSURE: 60 MMHG | OXYGEN SATURATION: 95 %

## 2024-11-06 PROCEDURE — G0299 HHS/HOSPICE OF RN EA 15 MIN: HCPCS

## 2024-11-06 NOTE — CASE COMMUNICATION
Patient missed a Physical Therapy visit from [facility] on 11/4/24.     Reason: Caregiver declined visit due to schedule conflict. She requests visit be on 11/7/24.       For your records only.   Per CMS Guidance, MD must be notified of missed/cancelled visits; therefore the prescribed frequency was not met.

## 2024-11-07 ENCOUNTER — HOME CARE VISIT (OUTPATIENT)
Dept: HOME HEALTH SERVICES | Facility: CLINIC | Age: 89
End: 2024-11-07
Payer: MEDICARE

## 2024-11-07 VITALS
RESPIRATION RATE: 18 BRPM | HEART RATE: 65 BPM | DIASTOLIC BLOOD PRESSURE: 58 MMHG | OXYGEN SATURATION: 95 % | TEMPERATURE: 98.1 F | SYSTOLIC BLOOD PRESSURE: 110 MMHG

## 2024-11-07 PROCEDURE — G0157 HHC PT ASSISTANT EA 15: HCPCS

## 2024-11-08 ENCOUNTER — HOME CARE VISIT (OUTPATIENT)
Dept: HOME HEALTH SERVICES | Facility: CLINIC | Age: 89
End: 2024-11-08
Payer: MEDICARE

## 2024-11-08 PROCEDURE — G0299 HHS/HOSPICE OF RN EA 15 MIN: HCPCS

## 2024-11-11 ENCOUNTER — HOME CARE VISIT (OUTPATIENT)
Dept: HOME HEALTH SERVICES | Facility: CLINIC | Age: 89
End: 2024-11-11
Payer: MEDICARE

## 2024-11-11 PROCEDURE — G0299 HHS/HOSPICE OF RN EA 15 MIN: HCPCS

## 2024-11-12 ENCOUNTER — READMISSION MANAGEMENT (OUTPATIENT)
Dept: CALL CENTER | Facility: HOSPITAL | Age: 89
End: 2024-11-12
Payer: MEDICARE

## 2024-11-12 ENCOUNTER — HOME CARE VISIT (OUTPATIENT)
Dept: HOME HEALTH SERVICES | Facility: CLINIC | Age: 89
End: 2024-11-12
Payer: MEDICARE

## 2024-11-12 VITALS
DIASTOLIC BLOOD PRESSURE: 60 MMHG | OXYGEN SATURATION: 94 % | RESPIRATION RATE: 16 BRPM | TEMPERATURE: 97.8 F | SYSTOLIC BLOOD PRESSURE: 102 MMHG | HEART RATE: 61 BPM

## 2024-11-12 VITALS
HEART RATE: 74 BPM | OXYGEN SATURATION: 95 % | SYSTOLIC BLOOD PRESSURE: 112 MMHG | RESPIRATION RATE: 18 BRPM | TEMPERATURE: 98.1 F | DIASTOLIC BLOOD PRESSURE: 60 MMHG

## 2024-11-12 VITALS
RESPIRATION RATE: 18 BRPM | SYSTOLIC BLOOD PRESSURE: 110 MMHG | HEART RATE: 63 BPM | TEMPERATURE: 98.6 F | DIASTOLIC BLOOD PRESSURE: 48 MMHG | OXYGEN SATURATION: 93 %

## 2024-11-12 PROCEDURE — G0157 HHC PT ASSISTANT EA 15: HCPCS

## 2024-11-13 ENCOUNTER — HOME CARE VISIT (OUTPATIENT)
Dept: HOME HEALTH SERVICES | Facility: CLINIC | Age: 89
End: 2024-11-13
Payer: MEDICARE

## 2024-11-13 PROCEDURE — G0299 HHS/HOSPICE OF RN EA 15 MIN: HCPCS

## 2024-11-14 ENCOUNTER — HOME CARE VISIT (OUTPATIENT)
Dept: HOME HEALTH SERVICES | Facility: CLINIC | Age: 89
End: 2024-11-14
Payer: MEDICARE

## 2024-11-14 VITALS
HEART RATE: 62 BPM | TEMPERATURE: 99.1 F | SYSTOLIC BLOOD PRESSURE: 100 MMHG | RESPIRATION RATE: 16 BRPM | OXYGEN SATURATION: 89 % | DIASTOLIC BLOOD PRESSURE: 68 MMHG

## 2024-11-14 VITALS
OXYGEN SATURATION: 92 % | DIASTOLIC BLOOD PRESSURE: 60 MMHG | RESPIRATION RATE: 18 BRPM | HEART RATE: 72 BPM | TEMPERATURE: 97.3 F | SYSTOLIC BLOOD PRESSURE: 102 MMHG

## 2024-11-14 PROCEDURE — G0157 HHC PT ASSISTANT EA 15: HCPCS

## 2024-11-14 NOTE — HOME HEALTH
Pt's daughter requests comfort measures and woundcare only for pt at this time. She states MD reports that pt has been having multiple strokes causing increased weakness. Daughter and cgs are I w/HEP for LE ROM, bed mobitliy, & transfers. Pt has been unable to assist cgs since her last hospitalization per daughter report.

## 2024-11-14 NOTE — CASE COMMUNICATION
Patient is being d/c'd from PT rx this date per daughter request. She requests comfort measures & woundcare only for patient at this times.

## 2024-11-15 ENCOUNTER — HOME CARE VISIT (OUTPATIENT)
Dept: HOME HEALTH SERVICES | Facility: CLINIC | Age: 89
End: 2024-11-15
Payer: MEDICARE

## 2024-11-15 PROCEDURE — G0299 HHS/HOSPICE OF RN EA 15 MIN: HCPCS

## 2024-11-16 VITALS
SYSTOLIC BLOOD PRESSURE: 102 MMHG | OXYGEN SATURATION: 92 % | TEMPERATURE: 97.7 F | RESPIRATION RATE: 18 BRPM | HEART RATE: 63 BPM | DIASTOLIC BLOOD PRESSURE: 58 MMHG

## 2024-11-18 ENCOUNTER — HOME CARE VISIT (OUTPATIENT)
Dept: HOME HEALTH SERVICES | Facility: CLINIC | Age: 89
End: 2024-11-18
Payer: MEDICARE

## 2024-11-18 PROCEDURE — G0300 HHS/HOSPICE OF LPN EA 15 MIN: HCPCS

## 2024-11-19 VITALS
TEMPERATURE: 96.2 F | SYSTOLIC BLOOD PRESSURE: 124 MMHG | HEART RATE: 65 BPM | RESPIRATION RATE: 18 BRPM | DIASTOLIC BLOOD PRESSURE: 54 MMHG | OXYGEN SATURATION: 94 %

## 2024-11-20 ENCOUNTER — HOME CARE VISIT (OUTPATIENT)
Dept: HOME HEALTH SERVICES | Facility: CLINIC | Age: 89
End: 2024-11-20
Payer: MEDICARE

## 2024-11-20 PROCEDURE — G0299 HHS/HOSPICE OF RN EA 15 MIN: HCPCS

## 2024-11-22 ENCOUNTER — HOME CARE VISIT (OUTPATIENT)
Dept: HOME HEALTH SERVICES | Facility: CLINIC | Age: 89
End: 2024-11-22
Payer: MEDICARE

## 2024-11-22 PROCEDURE — G0299 HHS/HOSPICE OF RN EA 15 MIN: HCPCS

## 2024-11-23 VITALS
OXYGEN SATURATION: 93 % | TEMPERATURE: 98.1 F | RESPIRATION RATE: 18 BRPM | SYSTOLIC BLOOD PRESSURE: 100 MMHG | HEART RATE: 72 BPM | DIASTOLIC BLOOD PRESSURE: 60 MMHG

## 2024-11-23 VITALS
HEART RATE: 78 BPM | SYSTOLIC BLOOD PRESSURE: 114 MMHG | TEMPERATURE: 98.6 F | OXYGEN SATURATION: 95 % | DIASTOLIC BLOOD PRESSURE: 60 MMHG | RESPIRATION RATE: 18 BRPM

## 2024-11-24 ENCOUNTER — HOME CARE VISIT (OUTPATIENT)
Dept: HOME HEALTH SERVICES | Facility: CLINIC | Age: 89
End: 2024-11-24
Payer: MEDICARE

## 2024-11-24 ENCOUNTER — NURSE TRIAGE (OUTPATIENT)
Dept: CALL CENTER | Facility: HOSPITAL | Age: 89
End: 2024-11-24
Payer: MEDICARE

## 2024-11-24 VITALS
TEMPERATURE: 98 F | OXYGEN SATURATION: 94 % | DIASTOLIC BLOOD PRESSURE: 62 MMHG | RESPIRATION RATE: 18 BRPM | SYSTOLIC BLOOD PRESSURE: 110 MMHG | HEART RATE: 76 BPM

## 2024-11-24 PROCEDURE — G0299 HHS/HOSPICE OF RN EA 15 MIN: HCPCS

## 2024-11-24 NOTE — TELEPHONE ENCOUNTER
Caller/daughter reports mother has wound vac dressing to right hip wound and wound vac has alarmed 2 times this morning with alert to change cannister. Caller has not changed it but states device will reset and then shuts off if moved.   Denies device losing suction. No noted drainage or pooling of drainage under drape. Caller has additional canisters available and states has been taught how to change. Advised caller to change cannister and if device continues to shut off or alarm to call back. Verbalized understanding. Patient does have HH nurse  as well.    Reason for Disposition   [1] NPWT AND [2] device is alarming    Additional Information   Negative: Major bleeding into NPWT device (e.g., cannister filling with blood, sudden gush of blood)   Negative: Major bleeding from wound (e.g., actively dripping or spurting)   Negative: Sounds like a life-threatening emergency to the triager   Negative: Widespread rash   Negative: [1] Wound infection suspected (i.e., pain, spreading redness, or pus; in a cut, puncture, scrape or sutured wound) AND [2] not chronic wound   Negative: [1] New foot sore or wound AND [2] diabetes mellitus   Negative: New skin sore or ulcer   Negative: SEVERE pain (e.g. excruciating)   Negative: [1] Total Contact Cast feels too tight (e.g., causing pain, numbness or toes tingling) AND [2] not improved with leg elevation   Negative: [1] NPWT AND [2] new bright red blood in canister or tubing   Negative: Patient sounds very sick or weak to the triager   Negative: [1] Looks infected (e.g., spreading redness, red streak, pus) AND [2] fever   Negative: [1] Looks infected AND [2] large red area (> 2 in. or 5 cm)   Negative: [1] Looks infected AND [2] diabetes mellitus or weak immune system (e.g., HIV positive, cancer chemo, splenectomy, organ transplant, chronic steroids)   Negative: [1] NPWT AND [2] suction is not working    Answer Assessment - Initial Assessment Questions  1. SYMPTOM or QUESTION:  "\"What is your reason for calling today?\" or \"How can I best help you?\" (e.g., bleeding, redness, drainage, pain)      Wound vac alarm  2.  WOUND APPEARANCE: \"What does the wound look like?\" \"Is there new or spreading redness?\"  If YES, ask: \"What is the size of the red area?\" (Inches, centimeters, or compare to size of a coin).  \"Has the drainage changed or increased?\"  \"Is there a new odor?\"        Wound vac dressing in place and is changed by HH.  3.  ONSET: \"When did the problem begin?\"      This a.m.  4.  LOCATION: \"Where is the wound(s)?\" (e.g., , ankle, lower left leg)      Above right hip  5.  BLEEDING: \"Are you having a problem with bleeding?\"  (e.g., amount, timing)      Denies  6.  PAIN: \"Is there any pain?\" If so, ask: \"How bad is the pain?\" (Scale 1-10; or mild, moderate, severe)      Denies  7. FEVER: \"Do you have a fever?\" If so, ask: \"What is your temperature, how was it measured, and when did it start?\"      Not asked  8. OTHER SYMPTOMS: \"Do you have any other symptoms?\" (e.g., chills, rash elsewhere, new weakness)      Denies  9.  TREATMENT: \"How have you been treating the wound?\"      Wound vac, changed per HH  10. NEGATIVE PRESSURE WOUND THERAPY  (NPWT): \"What concerns do you have about your negative pressure dressing?\"  \"When was your last dressing change?\"  \"Are you getting a device alert or alarm?\"  \"What have you done to try to fix the problem?\"           Device alert alam  11.  PREGNANCY: \"Is there any chance you are pregnant?\" \"When was your last menstrual period?        N/A    Protocols used: Chronic Wounds and Negative Pressure Wound Therapy-ADULT-AH    "

## 2024-11-26 ENCOUNTER — OFFICE VISIT (OUTPATIENT)
Dept: SURGERY | Facility: CLINIC | Age: 89
End: 2024-11-26
Payer: MEDICARE

## 2024-11-26 VITALS
SYSTOLIC BLOOD PRESSURE: 86 MMHG | DIASTOLIC BLOOD PRESSURE: 52 MMHG | WEIGHT: 107 LBS | HEIGHT: 63 IN | BODY MASS INDEX: 18.96 KG/M2

## 2024-11-26 DIAGNOSIS — L89.210: Primary | ICD-10-CM

## 2024-11-26 RX ORDER — METOPROLOL SUCCINATE 25 MG/1
1 TABLET, EXTENDED RELEASE ORAL DAILY
COMMUNITY
End: 2024-12-01

## 2024-11-26 RX ORDER — ALBUTEROL SULFATE 90 UG/1
2 INHALANT RESPIRATORY (INHALATION) EVERY 4 HOURS PRN
COMMUNITY
Start: 2024-11-19

## 2024-11-27 ENCOUNTER — HOME CARE VISIT (OUTPATIENT)
Dept: HOME HEALTH SERVICES | Facility: CLINIC | Age: 89
End: 2024-11-27
Payer: MEDICARE

## 2024-11-27 VITALS
DIASTOLIC BLOOD PRESSURE: 60 MMHG | TEMPERATURE: 98.1 F | OXYGEN SATURATION: 96 % | SYSTOLIC BLOOD PRESSURE: 112 MMHG | HEART RATE: 62 BPM | RESPIRATION RATE: 16 BRPM

## 2024-11-27 PROCEDURE — G0299 HHS/HOSPICE OF RN EA 15 MIN: HCPCS

## 2024-11-29 ENCOUNTER — HOME CARE VISIT (OUTPATIENT)
Dept: HOME HEALTH SERVICES | Facility: CLINIC | Age: 89
End: 2024-11-29
Payer: MEDICARE

## 2024-11-29 VITALS
TEMPERATURE: 96.8 F | DIASTOLIC BLOOD PRESSURE: 62 MMHG | HEART RATE: 62 BPM | RESPIRATION RATE: 20 BRPM | OXYGEN SATURATION: 94 % | SYSTOLIC BLOOD PRESSURE: 102 MMHG

## 2024-11-29 PROCEDURE — G0299 HHS/HOSPICE OF RN EA 15 MIN: HCPCS

## 2024-12-01 NOTE — PROGRESS NOTES
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 Now status post OR for debridement, wound VAC placement, and fecal disimpaction 10/17.    Patient has home health set up at home to aid with wound VAC changes which have been successful.  She intermittently has trouble with wound VAC seals but family has stayed on top of it and home health has been able to fix quickly.    Unfortunately patient continues to remain confused, family suspects she has had a stroke.  They have proceeded towards comfort care.    Objective:   Vital Signs       Intake & Output (last 3 days)       None            Physical Exam:     General Appearance:  Pleasantly altered, in wheelchair, nonverbal   HEENT:   Normocephalic, atraumatic, EOMI, MMM   Lungs:     Equal chest rise bilaterally.  No increased work of breathing    Heart:    Regular rhythm and normal rate   Abdomen:  Soft, nontender, nondistended   Extremities:     Moves all extremities spontaneously, no peripheral edema, wound VAC in place on right hip, 3 cm x 4 cm in dimension   Assessment and plan:    Assessment & Plan       * No active hospital problems. 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 Now status post OR for debridement, wound VAC placement, and fecal disimpaction 10/17.    Wound VAC has been healing appropriately.  Family has the necessary help required with home health and wound care.  No additional needs requested.  I suspect wound VAC will not be  necessary in the near future.  All questions were answered.     No need for scheduled follow-up, patient can follow-up on an as-needed basis.    Wade Alegria MD  12/01/24  09:12 CST    Part of this note may be an electronic transcription/translation of spoken language to printed text using the Dragon Dictation System.

## 2024-12-02 ENCOUNTER — HOME CARE VISIT (OUTPATIENT)
Dept: HOME HEALTH SERVICES | Facility: CLINIC | Age: 89
End: 2024-12-02
Payer: MEDICARE

## 2024-12-02 PROCEDURE — G0299 HHS/HOSPICE OF RN EA 15 MIN: HCPCS

## 2024-12-04 ENCOUNTER — HOME CARE VISIT (OUTPATIENT)
Dept: HOME HEALTH SERVICES | Facility: CLINIC | Age: 89
End: 2024-12-04
Payer: MEDICARE

## 2024-12-04 VITALS
OXYGEN SATURATION: 97 % | RESPIRATION RATE: 16 BRPM | HEART RATE: 81 BPM | SYSTOLIC BLOOD PRESSURE: 110 MMHG | TEMPERATURE: 97.9 F | DIASTOLIC BLOOD PRESSURE: 62 MMHG | HEART RATE: 87 BPM | SYSTOLIC BLOOD PRESSURE: 110 MMHG | DIASTOLIC BLOOD PRESSURE: 64 MMHG | OXYGEN SATURATION: 95 % | RESPIRATION RATE: 18 BRPM | TEMPERATURE: 97.9 F

## 2024-12-04 PROCEDURE — G0299 HHS/HOSPICE OF RN EA 15 MIN: HCPCS

## 2024-12-06 ENCOUNTER — HOME CARE VISIT (OUTPATIENT)
Dept: HOME HEALTH SERVICES | Facility: CLINIC | Age: 89
End: 2024-12-06
Payer: MEDICARE

## 2024-12-06 PROCEDURE — G0299 HHS/HOSPICE OF RN EA 15 MIN: HCPCS

## 2024-12-09 ENCOUNTER — HOME CARE VISIT (OUTPATIENT)
Dept: HOME HEALTH SERVICES | Facility: CLINIC | Age: 89
End: 2024-12-09
Payer: MEDICARE

## 2024-12-09 VITALS
SYSTOLIC BLOOD PRESSURE: 110 MMHG | OXYGEN SATURATION: 94 % | DIASTOLIC BLOOD PRESSURE: 60 MMHG | HEART RATE: 86 BPM | RESPIRATION RATE: 18 BRPM | TEMPERATURE: 98.3 F

## 2024-12-09 PROCEDURE — G0299 HHS/HOSPICE OF RN EA 15 MIN: HCPCS

## 2024-12-12 ENCOUNTER — HOME CARE VISIT (OUTPATIENT)
Dept: HOME HEALTH SERVICES | Facility: HOME HEALTHCARE | Age: 89
End: 2024-12-12
Payer: MEDICARE

## (undated) DEVICE — GAUZE,SPONGE,4"X4",12PLY,STERILE,LF,2'S: Brand: MEDLINE

## (undated) DEVICE — KT CANSTR VAC WND W/ISOLYSER SENSATRAC 500CC 10CS

## (undated) DEVICE — KT DRSNG VAC SIMPLACE SM 10PK

## (undated) DEVICE — ELECTRD BLD EZ CLN MOD XLNG 2.75IN

## (undated) DEVICE — SUT VIC 4/0 P3 18IN UD VCP494H

## (undated) DEVICE — PAD MINOR UNIVERSAL: Brand: MEDLINE INDUSTRIES, INC.

## (undated) DEVICE — 4-PORT MANIFOLD: Brand: NEPTUNE 2

## (undated) DEVICE — TRAP FLD MINIVAC MEGADYNE 100ML

## (undated) DEVICE — SUT VIC 3/0 RB1 27IN UD VCP215H